# Patient Record
Sex: MALE | URBAN - NONMETROPOLITAN AREA
[De-identification: names, ages, dates, MRNs, and addresses within clinical notes are randomized per-mention and may not be internally consistent; named-entity substitution may affect disease eponyms.]

---

## 2022-02-16 LAB
AVERAGE GLUCOSE: NORMAL
HBA1C MFR BLD: 10 %

## 2022-10-13 ENCOUNTER — APPOINTMENT (RX ONLY)
Dept: URBAN - NONMETROPOLITAN AREA CLINIC 1 | Facility: CLINIC | Age: 72
Setting detail: DERMATOLOGY
End: 2022-10-13

## 2022-10-13 DIAGNOSIS — L57.0 ACTINIC KERATOSIS: ICD-10-CM

## 2022-10-13 DIAGNOSIS — D485 NEOPLASM OF UNCERTAIN BEHAVIOR OF SKIN: ICD-10-CM

## 2022-10-13 PROBLEM — D48.5 NEOPLASM OF UNCERTAIN BEHAVIOR OF SKIN: Status: ACTIVE | Noted: 2022-10-13

## 2022-10-13 PROCEDURE — 17003 DESTRUCT PREMALG LES 2-14: CPT

## 2022-10-13 PROCEDURE — 17000 DESTRUCT PREMALG LESION: CPT | Mod: 59

## 2022-10-13 PROCEDURE — ? BIOPSY BY SHAVE METHOD

## 2022-10-13 PROCEDURE — 11103 TANGNTL BX SKIN EA SEP/ADDL: CPT

## 2022-10-13 PROCEDURE — ? LIQUID NITROGEN

## 2022-10-13 PROCEDURE — 11102 TANGNTL BX SKIN SINGLE LES: CPT

## 2022-10-13 PROCEDURE — ? TREATMENT REGIMEN

## 2022-10-13 PROCEDURE — ? COUNSELING

## 2022-10-13 ASSESSMENT — LOCATION DETAILED DESCRIPTION DERM
LOCATION DETAILED: RIGHT DISTAL RADIAL DORSAL FOREARM
LOCATION DETAILED: LEFT CENTRAL ZYGOMA
LOCATION DETAILED: LEFT DISTAL ULNAR DORSAL FOREARM
LOCATION DETAILED: LEFT SUPERIOR HELIX
LOCATION DETAILED: RIGHT RADIAL DORSAL HAND
LOCATION DETAILED: LEFT LATERAL ANTECUBITAL SKIN
LOCATION DETAILED: RIGHT ULNAR DORSAL HAND
LOCATION DETAILED: RIGHT NASAL DORSUM
LOCATION DETAILED: 1ST WEB SPACE RIGHT HAND
LOCATION DETAILED: LEFT PROXIMAL RADIAL DORSAL FOREARM

## 2022-10-13 ASSESSMENT — LOCATION SIMPLE DESCRIPTION DERM
LOCATION SIMPLE: LEFT EAR
LOCATION SIMPLE: RIGHT THUMB
LOCATION SIMPLE: LEFT ELBOW
LOCATION SIMPLE: LEFT ZYGOMA
LOCATION SIMPLE: NOSE
LOCATION SIMPLE: RIGHT FOREARM
LOCATION SIMPLE: RIGHT HAND
LOCATION SIMPLE: LEFT FOREARM

## 2022-10-13 ASSESSMENT — LOCATION ZONE DERM
LOCATION ZONE: HAND
LOCATION ZONE: EAR
LOCATION ZONE: FINGER
LOCATION ZONE: FACE
LOCATION ZONE: NOSE
LOCATION ZONE: ARM

## 2022-10-13 NOTE — HPI: SKIN LESION
What Type Of Note Output Would You Prefer (Optional)?: Bullet Format
How Severe Is Your Skin Lesion?: mild
Has Your Skin Lesion Been Treated?: not been treated
Is This A New Presentation, Or A Follow-Up?: Skin Lesion
Additional History: Skin lesion on nose x4 months. Scabs and not healing has tried multiple topicals and not helping. Photo in chart

## 2022-10-13 NOTE — PROCEDURE: BIOPSY BY SHAVE METHOD
Detail Level: Detailed
Depth Of Biopsy: dermis
Was A Bandage Applied: Yes
Size Of Lesion In Cm: 1
X Size Of Lesion In Cm: 0
Biopsy Type: H and E
Biopsy Method: Personna blade
Anesthesia Type: 1% lidocaine with epinephrine and a 1:10 solution of 8.4% sodium bicarbonate
Anesthesia Volume In Cc (Will Not Render If 0): 0.5
Hemostasis: Aluminum Chloride
Wound Care: Petrolatum
Dressing: bandage
Destruction After The Procedure: No
Type Of Destruction Used: Curettage
Curettage Text: The wound bed was treated with curettage after the biopsy was performed.
Cryotherapy Text: The wound bed was treated with cryotherapy after the biopsy was performed.
Electrodesiccation Text: The wound bed was treated with electrodesiccation after the biopsy was performed.
Electrodesiccation And Curettage Text: The wound bed was treated with electrodesiccation and curettage after the biopsy was performed.
Silver Nitrate Text: The wound bed was treated with silver nitrate after the biopsy was performed.
Lab: -97
Consent: Written consent was obtained and risk was reviewed  per signed  consent form. Biopsy was performed with patient verbal permission.
Post-care instructions were given and  reviewed with the patient in detail. Patient is to keep the biopsy site dry overnight, and then apply healing ointment daily until healed. Patient is instructed to call for any questions or problems.
Notification Instructions: Patient will be notified of biopsy results. However, patient instructed to call the office if not contacted within 2 weeks.
Billing Type: Third-Party Bill
Information: Selecting Yes will display possible errors in your note based on the variables you have selected. This validation is only offered as a suggestion for you. PLEASE NOTE THAT THE VALIDATION TEXT WILL BE REMOVED WHEN YOU FINALIZE YOUR NOTE. IF YOU WANT TO FAX A PRELIMINARY NOTE YOU WILL NEED TO TOGGLE THIS TO 'NO' IF YOU DO NOT WANT IT IN YOUR FAXED NOTE.
Size Of Lesion In Cm: 1.5
Size Of Lesion In Cm: 1.1

## 2022-11-15 ENCOUNTER — APPOINTMENT (RX ONLY)
Dept: URBAN - NONMETROPOLITAN AREA CLINIC 1 | Facility: CLINIC | Age: 72
Setting detail: DERMATOLOGY
End: 2022-11-15

## 2022-11-15 DIAGNOSIS — D485 NEOPLASM OF UNCERTAIN BEHAVIOR OF SKIN: ICD-10-CM

## 2022-11-15 DIAGNOSIS — L82.1 OTHER SEBORRHEIC KERATOSIS: ICD-10-CM

## 2022-11-15 DIAGNOSIS — D22 MELANOCYTIC NEVI: ICD-10-CM

## 2022-11-15 DIAGNOSIS — L57.0 ACTINIC KERATOSIS: ICD-10-CM

## 2022-11-15 DIAGNOSIS — L57.8 OTHER SKIN CHANGES DUE TO CHRONIC EXPOSURE TO NONIONIZING RADIATION: ICD-10-CM | Status: INADEQUATELY CONTROLLED

## 2022-11-15 DIAGNOSIS — L81.4 OTHER MELANIN HYPERPIGMENTATION: ICD-10-CM

## 2022-11-15 PROBLEM — D22.5 MELANOCYTIC NEVI OF TRUNK: Status: ACTIVE | Noted: 2022-11-15

## 2022-11-15 PROBLEM — D48.5 NEOPLASM OF UNCERTAIN BEHAVIOR OF SKIN: Status: ACTIVE | Noted: 2022-11-15

## 2022-11-15 PROBLEM — C44.321 SQUAMOUS CELL CARCINOMA OF SKIN OF NOSE: Status: ACTIVE | Noted: 2022-11-15

## 2022-11-15 PROCEDURE — ? BIOPSY BY SHAVE METHOD

## 2022-11-15 PROCEDURE — ? COUNSELING

## 2022-11-15 PROCEDURE — ? FULL BODY SKIN EXAM

## 2022-11-15 PROCEDURE — ? TREATMENT REGIMEN

## 2022-11-15 PROCEDURE — ? ADDITIONAL NOTES

## 2022-11-15 PROCEDURE — 11103 TANGNTL BX SKIN EA SEP/ADDL: CPT

## 2022-11-15 PROCEDURE — ? LIQUID NITROGEN

## 2022-11-15 PROCEDURE — ? SUNSCREEN TREATMENT REGIMEN

## 2022-11-15 PROCEDURE — 17004 DESTROY PREMAL LESIONS 15/>: CPT

## 2022-11-15 PROCEDURE — 11102 TANGNTL BX SKIN SINGLE LES: CPT | Mod: 59

## 2022-11-15 PROCEDURE — 99213 OFFICE O/P EST LOW 20 MIN: CPT | Mod: 25

## 2022-11-15 PROCEDURE — ? ORDER FOR PHOTODYNAMIC THERAPY

## 2022-11-15 ASSESSMENT — LOCATION DETAILED DESCRIPTION DERM
LOCATION DETAILED: LEFT MID TEMPLE
LOCATION DETAILED: LEFT PROXIMAL DORSAL MIDDLE FINGER
LOCATION DETAILED: RIGHT ULNAR DORSAL HAND
LOCATION DETAILED: LEFT POSTERIOR SHOULDER
LOCATION DETAILED: SUPERIOR MID FOREHEAD
LOCATION DETAILED: LEFT DORSAL SMALL METACARPOPHALANGEAL JOINT
LOCATION DETAILED: LEFT INFERIOR UPPER BACK
LOCATION DETAILED: LEFT RADIAL DORSAL HAND
LOCATION DETAILED: RIGHT POSTERIOR SHOULDER
LOCATION DETAILED: RIGHT PROXIMAL DORSAL FOREARM
LOCATION DETAILED: LEFT LATERAL ELBOW
LOCATION DETAILED: LEFT DISTAL DORSAL FOREARM
LOCATION DETAILED: RIGHT NASAL ALA
LOCATION DETAILED: LEFT MID-UPPER BACK
LOCATION DETAILED: LEFT ANTERIOR EARLOBE
LOCATION DETAILED: RIGHT ANTITRAGUS
LOCATION DETAILED: RIGHT RADIAL DORSAL HAND
LOCATION DETAILED: LEFT ULNAR DORSAL HAND

## 2022-11-15 ASSESSMENT — LOCATION ZONE DERM
LOCATION ZONE: FACE
LOCATION ZONE: FINGER
LOCATION ZONE: ARM
LOCATION ZONE: HAND
LOCATION ZONE: TRUNK
LOCATION ZONE: EAR
LOCATION ZONE: NOSE

## 2022-11-15 ASSESSMENT — LOCATION SIMPLE DESCRIPTION DERM
LOCATION SIMPLE: LEFT EAR
LOCATION SIMPLE: LEFT FOREARM
LOCATION SIMPLE: RIGHT EAR
LOCATION SIMPLE: LEFT UPPER BACK
LOCATION SIMPLE: LEFT TEMPLE
LOCATION SIMPLE: RIGHT HAND
LOCATION SIMPLE: RIGHT SHOULDER
LOCATION SIMPLE: LEFT MIDDLE FINGER
LOCATION SIMPLE: RIGHT FOREARM
LOCATION SIMPLE: LEFT ELBOW
LOCATION SIMPLE: RIGHT NOSE
LOCATION SIMPLE: SUPERIOR FOREHEAD
LOCATION SIMPLE: LEFT HAND
LOCATION SIMPLE: LEFT SHOULDER

## 2022-11-15 NOTE — PROCEDURE: ORDER FOR PHOTODYNAMIC THERAPY
Location: Arms and Hands
Face And Ears Incubation Time: 1 Hour
Arms Incubation Time: 2 Hours
Occlusion: No
Detail Level: Simple
Face And Scalp Incubation Time: 1 Hour for the face and 2 Hours for the scalp
Photosensitizer: Levulan
Consent: The procedure and risks were reviewed with the patient including but not limited to: burning, pigmentary changes, pain, blistering, scabbing, redness, and the possibility of needing numerous treatments. Strict photoprotection after the procedure was also discussed.
Face Incubation Time: 2 Hour
Frequency Of Pdt: Single Treatment
Pdt Type: BRANDO-U

## 2022-11-15 NOTE — PROCEDURE: BIOPSY BY SHAVE METHOD
Detail Level: Detailed
Depth Of Biopsy: dermis
Was A Bandage Applied: Yes
Size Of Lesion In Cm: 1
X Size Of Lesion In Cm: 0
Biopsy Type: H and E
Biopsy Method: Personna blade
Anesthesia Type: 1% lidocaine with epinephrine and a 1:10 solution of 8.4% sodium bicarbonate
Anesthesia Volume In Cc (Will Not Render If 0): 0.5
Hemostasis: Aluminum Chloride
Wound Care: Petrolatum
Dressing: bandage
Destruction After The Procedure: No
Type Of Destruction Used: Curettage
Curettage Text: The wound bed was treated with curettage after the biopsy was performed.
Cryotherapy Text: The wound bed was treated with cryotherapy after the biopsy was performed.
Electrodesiccation Text: The wound bed was treated with electrodesiccation after the biopsy was performed.
Electrodesiccation And Curettage Text: The wound bed was treated with electrodesiccation and curettage after the biopsy was performed.
Silver Nitrate Text: The wound bed was treated with silver nitrate after the biopsy was performed.
Lab: -97
Consent: Written consent was obtained and risk was reviewed  per signed  consent form. Biopsy was performed with patient verbal permission.
Post-care instructions were given and  reviewed with the patient in detail. Patient is to keep the biopsy site dry overnight, and then apply healing ointment daily until healed. Patient is instructed to call for any questions or problems.
Notification Instructions: Patient will be notified of biopsy results. However, patient instructed to call the office if not contacted within 2 weeks.
Billing Type: Third-Party Bill
Information: Selecting Yes will display possible errors in your note based on the variables you have selected. This validation is only offered as a suggestion for you. PLEASE NOTE THAT THE VALIDATION TEXT WILL BE REMOVED WHEN YOU FINALIZE YOUR NOTE. IF YOU WANT TO FAX A PRELIMINARY NOTE YOU WILL NEED TO TOGGLE THIS TO 'NO' IF YOU DO NOT WANT IT IN YOUR FAXED NOTE.
Size Of Lesion In Cm: 0.7

## 2022-11-15 NOTE — PROCEDURE: ADDITIONAL NOTES
Detail Level: Simple
Additional Notes: Patient scheduled for MOHS on 1/26/23.
Render Risk Assessment In Note?: no

## 2022-12-06 ENCOUNTER — APPOINTMENT (RX ONLY)
Dept: URBAN - NONMETROPOLITAN AREA CLINIC 1 | Facility: CLINIC | Age: 72
Setting detail: DERMATOLOGY
End: 2022-12-06

## 2022-12-06 DIAGNOSIS — L57.0 ACTINIC KERATOSIS: ICD-10-CM

## 2022-12-06 PROBLEM — I10 ESSENTIAL HYPERTENSION: Status: ACTIVE | Noted: 2022-12-06

## 2022-12-06 PROBLEM — E78.5 DYSLIPIDEMIA: Status: ACTIVE | Noted: 2022-12-06

## 2022-12-06 PROBLEM — I25.10 CORONARY ARTERY DISEASE: Status: ACTIVE | Noted: 2022-12-06

## 2022-12-06 PROBLEM — E11.9 TYPE 2 DIABETES MELLITUS (HCC): Status: ACTIVE | Noted: 2022-12-06

## 2022-12-06 PROCEDURE — ? PDT: BLUE

## 2022-12-06 PROCEDURE — 96567 PDT DSTR PRMLG LES SKN: CPT

## 2022-12-06 ASSESSMENT — LOCATION ZONE DERM
LOCATION ZONE: HAND
LOCATION ZONE: ARM

## 2022-12-06 ASSESSMENT — LOCATION DETAILED DESCRIPTION DERM
LOCATION DETAILED: LEFT PROXIMAL DORSAL FOREARM
LOCATION DETAILED: RIGHT RADIAL DORSAL HAND
LOCATION DETAILED: RIGHT PROXIMAL DORSAL FOREARM
LOCATION DETAILED: 4TH WEB SPACE LEFT HAND

## 2022-12-06 ASSESSMENT — LOCATION SIMPLE DESCRIPTION DERM
LOCATION SIMPLE: RIGHT HAND
LOCATION SIMPLE: LEFT HAND
LOCATION SIMPLE: RIGHT FOREARM
LOCATION SIMPLE: LEFT FOREARM

## 2022-12-06 NOTE — PROGRESS NOTES
[facility] CARDIOLOGY  [address] [facility], [address]  PHONE: [phone]      22    NAME:  David Marroquin  : 1950  MRN: [id number]         SUBJECTIVE:   David Marroquin is a 67 y.o. male seen for a consultation visit regarding the following:     Chief Complaint   Patient presents with    Consultation     VA Stress Test/ ECHO    Fatigue    Shortness of Breath    Chest Pain            HPI:  Consultation is requested by Trisha Atkins MD for evaluation of Consultation (VA Stress Test/ ECHO), Fatigue, Shortness of Breath, and Chest Pain   . Consult from the [place], said to have had stress test and echo, having trouble getting records to open. I am able to see his notes from his local PCP back in October at which time he had uncontrolled DM and had mentioned exertional arm pain, declined private sector cardiology referral and went back to the [place]. Attempted a treadmill stress test there, only went 2 minutes, achieved 102% predicted HR, reviewed strips, no significant ST changes but some equivocal flattening, inconclusive study    He's felt well since 2 coronary stents in [place] in  [place], apparently NSTEMI since procedure was done over period of weeks. He's done well since then until about a year ago started feeling fatigue and bilateral arm pain with exertion. On the treadmill he had at the [place] he did have reproduction of symptoms. His symptoms have not occurred at rest.  Usually happens when walking his dog. Sometimes will feel it when he eats. He doesn't really recall his symptoms prior to the stent apart from significant fatigue. Discomfort lasts until he rests for a few minutes. + dyspnea    Struggles with diet sometimes, A1C fell from > 8 to 7.4%, has been as high as 11%. PAST CARDIAC HISTORY:          NSTEMI - coronary stents x 2  Oct 2022       TMET (at [facility]) - 1.56 seconds, 102% HR, equivocal ST changes.  Reproduced anginal symptoms Deconditioned. Inconclusive study          Past Medical History, Past Surgical History, Family history, Social History, and Medications were all reviewed with the patient today and updated as necessary. Prior to Admission medications    Medication Sig Start Date End Date Taking?  Authorizing Provider   alogliptin (NESINA) 12.5 MG TABS tablet Take 12.5 mg by mouth daily 1/28/21  Yes Historical Provider, MD   acetaminophen (TYLENOL) 325 MG tablet  4/16/21  Yes Historical Provider, MD   Artificial Tear Solution (JUST TEARS EYE DROPS) SOLN  7/27/22  Yes Historical Provider, MD   aspirin 81 MG EC tablet Take 81 mg by mouth daily 4/28/16  Yes Historical Provider, MD   vitamin D3 (CHOLECALCIFEROL) 10 MCG (400 UNIT) TABS tablet Take 400 Units by mouth daily   Yes Historical Provider, MD   cyanocobalamin 1000 MCG tablet Take 1,000 mcg by mouth daily   Yes Historical Provider, MD   ferrous sulfate (IRON 325) 325 (65 Fe) MG tablet  7/27/22  Yes Historical Provider, MD   glipiZIDE (GLUCOTROL) 10 MG tablet Take 10 mg by mouth 2 times daily (before meals) 6/21/19  Yes Historical Provider, MD   finasteride (PROSCAR) 5 MG tablet Take 5 mg by mouth daily 6/21/19  Yes Historical Provider, MD   HYDROcodone-acetaminophen ([facility])  MG per tablet TAKE 1 TABLET BY MOUTH EVERY 8 HOURS AS NEEDED FOR MODERATE PAIN OR CHRONIC PAIN 11/13/22  Yes Historical Provider, MD   insulin glargine (LANTUS;BASAGLAR) 100 UNIT/ML injection pen Inject 16 Units into the skin daily 7/27/22  Yes Historical Provider, MD   SURE COMFORT PEN NEEDLES 32G X 6 MM [address] [facility]  10/17/22  Yes Historical Provider, MD   losartan-hydroCHLOROthiazide (HYZAAR) 100-25 MG per tablet Take 1 tablet by mouth daily 12/5/18  Yes Historical Provider, MD   metFORMIN (GLUCOPHAGE) 1000 MG tablet  4/16/21  Yes Historical Provider, MD   magnesium oxide (MAG-OX) 400 MG tablet Take 1 tablet by mouth 2 times daily 7/27/22 2/24/23 Yes Historical Provider, MD   rosuvastatin (CRESTOR) 40 MG tablet Take 40 mg by mouth daily   Yes Historical Provider, MD   naloxone 4 MG/0.1ML LIQD nasal spray 1 spray once as needed 10/13/21  Yes Historical Provider, MD   miconazole (MICOTIN) 2 % powder  22  Yes Historical Provider, MD   metoprolol succinate (TOPROL XL) 25 MG extended release tablet Take 1 tablet by mouth daily 22  Yes Hugh Suh MD   isosorbide mononitrate (IMDUR) 30 MG extended release tablet Take 1 tablet by mouth daily 22  Yes Hugh Suh MD   nitroGLYCERIN (NITROSTAT) 0.4 MG SL tablet Place 1 tablet under the tongue every 5 minutes as needed for Chest pain (X 3 DOSES, THEN CALL 911) up to max of 3 total doses. If no relief after 1 dose, call 911. 22  Yes Hugh Suh MD     No Known Allergies  Past Medical History:   Diagnosis Date    Coronary artery disease     Dyslipidemia     Environmental allergies     Essential hypertension     Type 2 diabetes mellitus ([facility] [address]      Past Surgical History:   Procedure Laterality Date    DIAGNOSTIC CARDIAC CATH LAB PROCEDURE      PTCA       History reviewed. No pertinent family history. Social History     Tobacco Use    Smoking status: Former     Types: Cigarettes     Quit date:      Years since quittin.9     Passive exposure: Current    Smokeless tobacco: Never   Substance Use Topics    Alcohol use: Never       ROS:    Review of Systems   Cardiovascular:  Positive for chest pain. Respiratory:  Positive for cough and shortness of breath. Musculoskeletal:  Positive for back pain. PHYSICAL EXAM:   /82   Pulse 84   Ht 5' 10\" (1.778 m)   Wt 163 lb (73.9 kg)   BMI 23.39 kg/m²      Physical Exam  Constitutional:       Appearance: Normal appearance. HENT:      Head: Normocephalic and atraumatic. Eyes:      Conjunctiva/sclera: Conjunctivae normal.   Neck:      Vascular: No carotid bruit. Cardiovascular:      Rate and Rhythm: Normal rate and regular rhythm.       Heart sounds: No murmur heard. No friction rub. No gallop. Pulmonary:      Effort: No respiratory distress. Breath sounds: No wheezing or rales. Abdominal:      General: Abdomen is flat. There is no distension. Palpations: Abdomen is soft. Tenderness: There is no abdominal tenderness. Musculoskeletal:         General: No swelling. Cervical back: Neck supple. Skin:     General: Skin is warm and dry. Neurological:      General: No focal deficit present. Mental Status: He is alert. Psychiatric:         Mood and Affect: Mood normal.         Behavior: Behavior normal.       Medical problems and test results were reviewed with the patient today.      DATA REVIEW    10/11/22 0934     TRIGLYCERIDES 44 - 150 mg/dL 128    Comment: Triglyceride Level Risk Classification:   1. <150 mg/dL - Normal   2. 150-199 mg/dL - Borderline High   3. 200-500 mg/dL - High   4. >500 mg/dL - Very High   CHOLESTEROL 115 - 200 mg/dL 128    Comment: Cholesterol Values:   1. 200-239 mg/dL - Borderline   2. >240 mg/dL - High   HIGH DENSITY LIPOPROTEIN 40.0 - 60.0 mg/dL 37.0 Low     Comment: The NCEP guidelines classify HDL-C levels as follows:   1. <40 mg/dL as indicative of a major risk factor for Coronary Heart Disease   2. >60 mg/dL as a negative risk factor for Coronary Heart Disease   LOW DENSITY LIPOPROTEIN <100 mg/dL 76    Comment: The NCEP guidelines classify LDL-Cholesterol levels as follows:   1. <100 mg/dL - Optimal   2. 100-129 mg/dL - Near Optimal/Above Optimal   3. 131-159 mg/dL - Borderline High   4. 160-189 mg/dL - High   5. >190 mg/dL - Very High   VERY LOW DENSITY LIPID 10 - 40 mg/dL 25.6    NON-HDL CHOLESTEROL 0.00 - 130.00 mg/dL 91.00       10/11/22 0934    HEMOGLOBIN A1C 0.0 - 5.7 % 7.4 High        10/11/22 0934    WBC 3.1 - 9.7 x 10*3/uL 7.8    RBC 4.08 - 5.70 x 10*6/uL 4.87    HGB 13.1 - 16.8 g/dL 14.0    HCT 38.2 - 48.4 % 41.0    MCV 80.1 - 98.7 fL 84.0    MCH 27.5 - 34.3 pg 28.8    MCHC 33.2 - 35.7 g/dL 34.3    RDW 11.7 - 15.0 % 13.0    Platelets 858 - 941 x 10*3/uL 245       10/11/22 0934    Sodium 133 - 146 mmol/L 140    Potassium 3.5 - 5.2 mmol/L 4.4    Chloride 100 - 111 mmol/L 102    Carbon Dioxide 23.0 - 32.6 mmol/L 28.4    Anion Gap 6 - 13 mmol/L 10    Osmolality Calculation 271.00 - 318.00 mOsm/kg 284.22    Urea Nitrogen 7 - 25 mg/dL 18    Creatinine 0.47 - 1.35 mg/dL 1.25    BUN/Creat Ratio 8.00 - 20.00 NULL 14.40    Glucose 70 - 100 mg/dL 151 High     Calcium 8.9 - 10.3 mg/dL 9.5    Corrected Calcium 8.9 - 10.3 mg/dL 9.1    Total Protein 6.1 - 8.0 g/dL 7.5    Albumin 3.5 - 4.9 g/dL 4.5    Globulin 2.00 - 3.70 g/dL 3.00    Alanine Transferase 8 - 39 IU/L 23    Aspartamine Transferase 12 - 33 IU/L 23    Alkaline Phosphatase 36 - 108 IU/L 34 Low     Total Bilirubin 0.0 - 1.5 mg/dL 0.5    Albumin/Globulin Ratio 1 - 2 2    eGFR Do Not Use the lab's eGFR calculation for medication dose adjustments per Pharmacy. mL/min/1.73m*2 60.8    Modified Cockcroft-Gault CrCl 90.00 - 139.00 mL/minute 54.34 Low         EKG    Sinus  Rhythm 80  IMI age undetermined  normal axis  IVCD  NSST abnormality    CXR/IMAGING        DEVICE INTERROGATION        OUTSIDE RECORDS REVIEW    Records from outside providers have been reviewed and summarized as noted in the HPI, past history and data review sections of this note, and reviewed with patient. .       ASSESSMENT and Cindy Sandoval was seen today for consultation, fatigue, shortness of breath and chest pain. Diagnoses and all orders for this visit:    Essential hypertension    Coronary artery disease of native artery of native heart with stable angina pectoris ([address]  -     EKG 12 Lead    Dyslipidemia    Type 2 diabetes mellitus without complication, with long-term current use of insulin (HCC)    Accelerating angina [facility])  -     Case Request Cardiac Cath Lab  -     CBC; Future  -     Basic Metabolic Panel;  Future    Other orders  -     metoprolol succinate (TOPROL XL) 25 MG extended release tablet; Take 1 tablet by mouth daily  -     isosorbide mononitrate (IMDUR) 30 MG extended release tablet; Take 1 tablet by mouth daily  -     nitroGLYCERIN (NITROSTAT) 0.4 MG SL tablet; Place 1 tablet under the tongue every 5 minutes as needed for Chest pain (X 3 DOSES, THEN CALL 911) up to max of 3 total doses. If no relief after 1 dose, call 911. IMPRESSION:    Typical angina in an accelerating pattern with established coronary disease, diabetes. High risk patient. Add long acting nitrate, switch to once daily formulation of BB, reviewed use of prn nitrates and when to proceed to ER, and arrange LHC/PCI. Discussed risks of procedure including, but not limited to, bleeding, infection, MI, CVA, renal failure, allergic reaction, arterial damage. Patient understands and wishes to proceed. DM control is improving, encouraged to work on diet. He and his PCP have previously attempted Doris Solian and Rybelsus but not covered by the [place]. He believes the last attempt was a year ago. Pending course, would consider trying this again with patient assistance applications if necessary for improved cardiovascular outcomes in diabetics with SGLT2i and GLP 1 therapy. BP reasonably controlled, continue to monitor    He is anticipating surgery for a skin cancer in January. I am attempting to contact his dermatology practice, Karen Sinha PA-C to expedite that to be done prior to cath. Post stents, he would not be able to interrupt DAPT for at least 3-6 months --I've left a message with the nursing line, unable to speak with a provider.  [phone]    Return for AFTER PROCEDURE TO REVIEW RESULTS. Thank you for allowing me to participate in this patient's care. Please call or contact me if there are any questions or concerns regarding the above.       Bhupendra Mccarty MD  12/07/22  11:46 AM

## 2022-12-07 ENCOUNTER — INITIAL CONSULT (OUTPATIENT)
Dept: CARDIOLOGY CLINIC | Age: 72
End: 2022-12-07
Payer: OTHER GOVERNMENT

## 2022-12-07 VITALS
DIASTOLIC BLOOD PRESSURE: 82 MMHG | HEART RATE: 84 BPM | BODY MASS INDEX: 23.34 KG/M2 | SYSTOLIC BLOOD PRESSURE: 138 MMHG | HEIGHT: 70 IN | WEIGHT: 163 LBS

## 2022-12-07 DIAGNOSIS — I20.0 ACCELERATING ANGINA (HCC): ICD-10-CM

## 2022-12-07 DIAGNOSIS — I25.118 CORONARY ARTERY DISEASE OF NATIVE ARTERY OF NATIVE HEART WITH STABLE ANGINA PECTORIS (HCC): ICD-10-CM

## 2022-12-07 DIAGNOSIS — I10 ESSENTIAL HYPERTENSION: Primary | ICD-10-CM

## 2022-12-07 DIAGNOSIS — E11.9 TYPE 2 DIABETES MELLITUS WITHOUT COMPLICATION, WITH LONG-TERM CURRENT USE OF INSULIN (HCC): ICD-10-CM

## 2022-12-07 DIAGNOSIS — Z79.4 TYPE 2 DIABETES MELLITUS WITHOUT COMPLICATION, WITH LONG-TERM CURRENT USE OF INSULIN (HCC): ICD-10-CM

## 2022-12-07 DIAGNOSIS — E78.5 DYSLIPIDEMIA: ICD-10-CM

## 2022-12-07 LAB
ANION GAP SERPL CALC-SCNC: 8 MMOL/L (ref 2–11)
BUN SERPL-MCNC: 16 MG/DL (ref 8–23)
CALCIUM SERPL-MCNC: 10.2 MG/DL (ref 8.3–10.4)
CHLORIDE SERPL-SCNC: 103 MMOL/L (ref 101–110)
CO2 SERPL-SCNC: 27 MMOL/L (ref 21–32)
CREAT SERPL-MCNC: 1.3 MG/DL (ref 0.8–1.5)
ERYTHROCYTE [DISTWIDTH] IN BLOOD BY AUTOMATED COUNT: 13.2 % (ref 11.9–14.6)
GLUCOSE SERPL-MCNC: 292 MG/DL (ref 65–100)
HCT VFR BLD AUTO: 44 % (ref 41.1–50.3)
HGB BLD-MCNC: 14.1 G/DL (ref 13.6–17.2)
MCH RBC QN AUTO: 28.7 PG (ref 26.1–32.9)
MCHC RBC AUTO-ENTMCNC: 32 G/DL (ref 31.4–35)
MCV RBC AUTO: 89.6 FL (ref 82–102)
NRBC # BLD: 0 K/UL (ref 0–0.2)
PLATELET # BLD AUTO: 240 K/UL (ref 150–450)
PMV BLD AUTO: 10.1 FL (ref 9.4–12.3)
POTASSIUM SERPL-SCNC: 4.4 MMOL/L (ref 3.5–5.1)
RBC # BLD AUTO: 4.91 M/UL (ref 4.23–5.6)
SODIUM SERPL-SCNC: 138 MMOL/L (ref 133–143)
WBC # BLD AUTO: 7.4 K/UL (ref 4.3–11.1)

## 2022-12-07 PROCEDURE — 3079F DIAST BP 80-89 MM HG: CPT | Performed by: INTERNAL MEDICINE

## 2022-12-07 PROCEDURE — 3075F SYST BP GE 130 - 139MM HG: CPT | Performed by: INTERNAL MEDICINE

## 2022-12-07 PROCEDURE — 99204 OFFICE O/P NEW MOD 45 MIN: CPT | Performed by: INTERNAL MEDICINE

## 2022-12-07 PROCEDURE — 93000 ELECTROCARDIOGRAM COMPLETE: CPT | Performed by: INTERNAL MEDICINE

## 2022-12-07 PROCEDURE — 1123F ACP DISCUSS/DSCN MKR DOCD: CPT | Performed by: INTERNAL MEDICINE

## 2022-12-07 PROCEDURE — 3046F HEMOGLOBIN A1C LEVEL >9.0%: CPT | Performed by: INTERNAL MEDICINE

## 2022-12-07 RX ORDER — GLIPIZIDE 10 MG/1
10 TABLET ORAL
COMMUNITY
Start: 2019-06-21

## 2022-12-07 RX ORDER — OMEGA-3S/DHA/EPA/FISH OIL/D3 300MG-1000
400 CAPSULE ORAL DAILY
COMMUNITY

## 2022-12-07 RX ORDER — DEXTRAN 70/HYPROMELLOSE
DROPS OPHTHALMIC (EYE)
COMMUNITY
Start: 2022-07-27

## 2022-12-07 RX ORDER — METOPROLOL SUCCINATE 25 MG/1
25 TABLET, EXTENDED RELEASE ORAL DAILY
Qty: 90 TABLET | Refills: 3 | Status: SHIPPED | OUTPATIENT
Start: 2022-12-07

## 2022-12-07 RX ORDER — ASPIRIN 81 MG/1
81 TABLET ORAL DAILY
COMMUNITY
Start: 2016-04-28

## 2022-12-07 RX ORDER — FINASTERIDE 5 MG/1
5 TABLET, FILM COATED ORAL DAILY
COMMUNITY
Start: 2019-06-21

## 2022-12-07 RX ORDER — ISOSORBIDE MONONITRATE 30 MG/1
30 TABLET, EXTENDED RELEASE ORAL DAILY
Qty: 90 TABLET | Refills: 3 | Status: SHIPPED | OUTPATIENT
Start: 2022-12-07

## 2022-12-07 RX ORDER — ALOGLIPTIN 12.5 MG/1
12.5 TABLET, FILM COATED ORAL DAILY
COMMUNITY
Start: 2021-01-28

## 2022-12-07 RX ORDER — PEN NEEDLE, DIABETIC 32GX 5/32"
NEEDLE, DISPOSABLE MISCELLANEOUS
COMMUNITY
Start: 2022-10-17

## 2022-12-07 RX ORDER — MAGNESIUM OXIDE 400 MG/1
1 TABLET ORAL 2 TIMES DAILY
COMMUNITY
Start: 2022-07-27 | End: 2023-02-24

## 2022-12-07 RX ORDER — NALOXONE HYDROCHLORIDE 4 MG/.1ML
1 SPRAY NASAL
COMMUNITY
Start: 2021-10-13

## 2022-12-07 RX ORDER — HYDROCODONE BITARTRATE AND ACETAMINOPHEN 10; 325 MG/1; MG/1
TABLET ORAL
COMMUNITY
Start: 2022-11-13

## 2022-12-07 RX ORDER — LOSARTAN POTASSIUM AND HYDROCHLOROTHIAZIDE 25; 100 MG/1; MG/1
1 TABLET ORAL DAILY
COMMUNITY
Start: 2018-12-05

## 2022-12-07 RX ORDER — ROSUVASTATIN CALCIUM 40 MG/1
40 TABLET, COATED ORAL DAILY
COMMUNITY

## 2022-12-07 RX ORDER — FERROUS SULFATE 325(65) MG
TABLET ORAL
COMMUNITY
Start: 2022-07-27

## 2022-12-07 RX ORDER — NITROGLYCERIN 0.4 MG/1
0.4 TABLET SUBLINGUAL EVERY 5 MIN PRN
Qty: 25 TABLET | Refills: 5 | Status: SHIPPED | OUTPATIENT
Start: 2022-12-07

## 2022-12-07 RX ORDER — ACETAMINOPHEN 325 MG/1
TABLET ORAL
COMMUNITY
Start: 2021-04-16

## 2022-12-07 ASSESSMENT — ENCOUNTER SYMPTOMS
BACK PAIN: 1
COUGH: 1
SHORTNESS OF BREATH: 1

## 2022-12-07 NOTE — PATIENT INSTRUCTIONS
Patient Education        Angina: Care Instructions  Overview     Angina happens when there is not enough blood flow to your heart muscle. Angina is a sign of coronary artery disease. Coronary artery disease happens when fatty deposits called plaque (say \"plak\") build up inside your coronary arteries. This plaque may limit the amount of blood to your heart muscle. Having coronary artery disease also increases your risk of a heart attack. Chest pain or pressure is the most common symptom of angina. But some people have other symptoms, like:  Pain, pressure, or a strange feeling in the back, neck, jaw, or upper belly, or in one or both shoulders or arms. Shortness of breath. Nausea or vomiting. Lightheadedness or sudden weakness. Fast or irregular heartbeat. Most women feel symptoms in their chest. But women are somewhat more likely than men to have other angina symptoms like shortness of breath, tiredness, nausea, and back or jaw pain. Angina can be dangerous. That's why it is important to pay attention to your symptoms. Know what is typical for you, learn how to control your symptoms, and understand when you need to get treatment. A change in your usual pattern of symptoms is an emergency. It may mean that you are having a heart attack. The doctor has checked you carefully, but problems can develop later. If you notice any problems or new symptoms, get medical treatment right away. Follow-up care is a key part of your treatment and safety. Be sure to make and go to all appointments, and call your doctor if you are having problems. It's also a good idea to know your test results and keep a list of the medicines you take. How can you care for yourself at home? Medicines    If your doctor has given you nitroglycerin for angina symptoms, keep it with you at all times. If you have symptoms, sit down and rest, and take the first dose of nitroglycerin as directed.  If your symptoms get worse or are not getting better within 5 minutes, call 911 right away. Stay on the phone. The emergency  will give you further instructions. If your doctor advises it, take 1 low-dose aspirin a day to prevent heart attack. Be safe with medicines. Take your medicines exactly as prescribed. Call your doctor if you think you are having a problem with your medicine. You will get more details on the specific medicines your doctor prescribes. Lifestyle changes    Do not smoke. If you need help quitting, talk to your doctor about stop-smoking programs and medicines. These can increase your chances of quitting for good. Eat a heart-healthy diet that is low in saturated fat and salt, and is high in fiber. Talk to your doctor or a dietitian about healthy eating. Stay at a healthy weight. Or lose weight if you need to. Activity    Talk to your doctor about a level of activity that is safe for you. If an activity causes angina symptoms, stop and rest.   When should you call for help? Call 911 anytime you think you may need emergency care. For example, call if:    You passed out (lost consciousness). You have symptoms of a heart attack. These may include:  Chest pain or pressure, or a strange feeling in the chest.  Sweating. Shortness of breath. Nausea or vomiting. Pain, pressure, or a strange feeling in the back, neck, jaw, or upper belly or in one or both shoulders or arms. Lightheadedness or sudden weakness. A fast or irregular heartbeat. After you call 911, the  may tell you to chew 1 adult-strength or 2 to 4 low-dose aspirin. Wait for an ambulance. Do not try to drive yourself. You have angina symptoms that do not go away with rest or are not getting better within 5 minutes after you take a dose of nitroglycerin. Call your doctor now if:    Your angina symptoms seem worse but still follow your typical pattern.  You can predict when symptoms will happen, but they may come on sooner, feel worse, or last longer. You feel dizzy or lightheaded, or you feel like you may faint. Watch closely for changes in your health, and be sure to contact your doctor if you have any problems. Where can you learn more? Go to [email or web address] org and sign in to your [employer] account. Enter H129 in the FUELUP box to learn more about \"Angina: Care Instructions. \"     If you do not have an account, please click on the \"Sign Up Now\" link. Current as of: January 10, 2022               Content Version: 13.4  © 1507-0679 Healthwise, [employer]. Care instructions adapted under license by [facility] [place] ([facility]). If you have questions about a medical condition or this instruction, always ask your healthcare professional. [address] any warranty or liability for your use of this information. Please visit www.cardiosmart. org for more information regarding cardiovascular disease prevention and treatment.

## 2022-12-16 ENCOUNTER — TELEPHONE (OUTPATIENT)
Dept: CARDIOLOGY CLINIC | Age: 72
End: 2022-12-16

## 2022-12-16 NOTE — TELEPHONE ENCOUNTER
Wants to cancel his heart cath on Monday. He is still sick with sore throat and his handicapped wife has the flu. Having surgery on a skin cancer on his nose soon too and wants to wait til after Ned on his cath . Wants Destiney to call him please and cancel Mondays cath

## 2022-12-19 ENCOUNTER — TELEPHONE (OUTPATIENT)
Dept: CARDIOLOGY CLINIC | Age: 72
End: 2022-12-19

## 2022-12-19 NOTE — TELEPHONE ENCOUNTER
Sierra Cintron with [facility] called stating she needs the OV notes with Dr Henna Vivas on 12/7/22. Please fax to # [phone].

## 2023-01-09 NOTE — PROGRESS NOTES
[facility] CARDIOLOGY  [address], [address]  PHONE: [phone]      01/10/23    NAME:  Jasson Calzaad  : 1950  MRN: [id number]         SUBJECTIVE:   Jasson Calzada is a 75 y.o. male seen for a follow up visit regarding the following:     Chief Complaint   Patient presents with    Fatigue     1 month visit and med review. HPI:  Follow up  Fatigue (1 month visit and med review.  )   . Was supposed to have had LHC for accelerating angina but patient developed URI and canceled it, hasn't yet rescheduled, states he was unable to get dermatologist to move up the procedure, we had contacted them to expedite it but it's still scheduled for 2 weeks from now, using a traveling surgeon, apparently, who was not able to reschedule. He has had no anginal quality chest pain, continues to note dyspnea and morning sputum production but better than previous. Fax note to dermatology practice: Sandra Maynard PA-C  Ph [phone]        PAST CARDIAC HISTORY:          NSTEMI - coronary stents x 2  Oct 2022       TMET (at [facility]) - 1.56 seconds, 102% HR, equivocal ST changes. Reproduced anginal symptoms Deconditioned. Inconclusive study  Dec 2022       LHC - canceled d/t URI             Key CAD CHF Meds            losartan (COZAAR) 100 MG tablet (Taking)    Sig - Route: Take 1 tablet by mouth daily - Oral    rosuvastatin (CRESTOR) 40 MG tablet (Taking)    Class: Historical Med    metoprolol succinate (TOPROL XL) 25 MG extended release tablet (Taking)    Sig - Route: Take 1 tablet by mouth daily - Oral          Key Antihyperglycemic Medications            dapagliflozin (FARXIGA) 10 MG tablet (Taking)    Sig - Route:  Take 1 tablet by mouth every morning - Oral    glipiZIDE (GLUCOTROL) 10 MG tablet (Taking)    Class: Historical Med    alogliptin (NESINA) 12.5 MG TABS tablet (Taking)    Class: Historical Med    insulin glargine (LANTUS;BASAGLAR) 100 UNIT/ML injection pen (Taking) Class: Historical Med    metFORMIN (GLUCOPHAGE) 1000 MG tablet (Taking)    Class: Historical Med                Past Medical History, Past Surgical History, Family history, Social History, and Medications were all reviewed with the patient today and updated as necessary. Prior to Admission medications    Medication Sig Start Date End Date Taking?  Authorizing Provider   dapagliflozin (FARXIGA) 10 MG tablet Take 1 tablet by mouth every morning 1/10/23  Yes John Claros MD   losartan (COZAAR) 100 MG tablet Take 1 tablet by mouth daily 1/10/23  Yes John Claros MD   glipiZIDE (GLUCOTROL) 10 MG tablet Take 1 tablet by mouth daily (with breakfast) 1/10/23  Yes John Claros MD   alogliptin (NESINA) 12.5 MG TABS tablet Take 12.5 mg by mouth daily 1/28/21  Yes Historical Provider, MD   acetaminophen (TYLENOL) 325 MG tablet  4/16/21  Yes Historical Provider, MD   Artificial Tear Solution (JUST TEARS EYE DROPS) SOLN  7/27/22  Yes Historical Provider, MD   aspirin 81 MG EC tablet Take 81 mg by mouth daily 4/28/16  Yes Historical Provider, MD   vitamin D3 (CHOLECALCIFEROL) 10 MCG (400 UNIT) TABS tablet Take 400 Units by mouth daily   Yes Historical Provider, MD   cyanocobalamin 1000 MCG tablet Take 1,000 mcg by mouth daily   Yes Historical Provider, MD   ferrous sulfate (IRON 325) 325 (65 Fe) MG tablet  7/27/22  Yes Historical Provider, MD   finasteride (PROSCAR) 5 MG tablet Take 5 mg by mouth daily 6/21/19  Yes Historical Provider, MD   HYDROcodone-acetaminophen ([facility])  MG per tablet TAKE 1 TABLET BY MOUTH EVERY 8 HOURS AS NEEDED FOR MODERATE PAIN OR CHRONIC PAIN 11/13/22  Yes Historical Provider, MD   insulin glargine (LANTUS;BASAGLAR) 100 UNIT/ML injection pen Inject 16 Units into the skin daily 7/27/22  Yes Historical Provider, MD   SURE COMFORT PEN NEEDLES 32G X 6 MM [facility]  10/17/22  Yes Historical Provider, MD   metFORMIN (GLUCOPHAGE) 1000 MG tablet  4/16/21  Yes Historical Provider, MD   magnesium oxide (MAG-OX) 400 MG tablet Take 1 tablet by mouth 2 times daily 22 Yes Historical Provider, MD   rosuvastatin (CRESTOR) 40 MG tablet Take 40 mg by mouth daily   Yes Historical Provider, MD   naloxone 4 MG/0.1ML LIQD nasal spray 1 spray once as needed 10/13/21  Yes Historical Provider, MD   miconazole (MICOTIN) 2 % powder  22  Yes Historical Provider, MD   metoprolol succinate (TOPROL XL) 25 MG extended release tablet Take 1 tablet by mouth daily 22  Yes Sera Gross MD   isosorbide mononitrate (IMDUR) 30 MG extended release tablet Take 1 tablet by mouth daily 22  Yes Sera Gross MD   nitroGLYCERIN (NITROSTAT) 0.4 MG SL tablet Place 1 tablet under the tongue every 5 minutes as needed for Chest pain (X 3 DOSES, THEN CALL 911) up to max of 3 total doses. If no relief after 1 dose, call 911. 22  Yes Sera Gross MD     Allergies   Allergen Reactions    Ampicillin Hives    Sulfa Antibiotics Hives     Past Medical History:   Diagnosis Date    Coronary artery disease     Dyslipidemia     Environmental allergies     Essential hypertension     Type 2 diabetes mellitus ([facility] [address]      Past Surgical History:   Procedure Laterality Date    DIAGNOSTIC CARDIAC CATH LAB PROCEDURE      PTCA       History reviewed. No pertinent family history. Social History     Tobacco Use    Smoking status: Former     Types: Cigarettes     Quit date:      Years since quittin.0     Passive exposure: Current    Smokeless tobacco: Never   Substance Use Topics    Alcohol use: Never       ROS:    Review of Systems   Constitutional: Positive for malaise/fatigue. Cardiovascular:  Negative for chest pain and leg swelling. Respiratory:  Positive for cough, shortness of breath and sputum production.          PHYSICAL EXAM:   /64   Pulse 84   Ht 5' 10\" (1.778 m)   Wt 163 lb 12.8 oz (74.3 kg) Comment: with shoes  BMI 23.50 kg/m²      Wt Readings from Last 3 Encounters:   01/10/23 163 lb 12.8 oz (74.3 kg)   12/07/22 163 lb (73.9 kg)     BP Readings from Last 3 Encounters:   01/10/23 126/64   12/07/22 138/82     Pulse Readings from Last 3 Encounters:   01/10/23 84   12/07/22 84           Physical Exam  Constitutional:       Appearance: Normal appearance. HENT:      Head: Normocephalic and atraumatic. Eyes:      Conjunctiva/sclera: Conjunctivae normal.   Neck:      Vascular: No carotid bruit. Cardiovascular:      Rate and Rhythm: Normal rate and regular rhythm. Heart sounds: No murmur heard. No friction rub. No gallop. Pulmonary:      Effort: No respiratory distress. Breath sounds: No wheezing or rales. Musculoskeletal:         General: No swelling. Cervical back: Neck supple. Skin:     General: Skin is warm and dry. Neurological:      General: No focal deficit present. Mental Status: He is alert. Psychiatric:         Mood and Affect: Mood normal.         Behavior: Behavior normal.       Medical problems and test results were reviewed with the patient today. DATA REVIEW        BMP  Lab Results   Component Value Date/Time     12/07/2022 11:11 AM    K 4.4 12/07/2022 11:11 AM     12/07/2022 11:11 AM    CO2 27 12/07/2022 11:11 AM    BUN 16 12/07/2022 11:11 AM    CREATININE 1.30 12/07/2022 11:11 AM    GLUCOSE 292 12/07/2022 11:11 AM    CALCIUM 10.2 12/07/2022 11:11 AM          EKG        CXR/IMAGING        DEVICE INTERROGATION        OUTSIDE RECORDS REVIEW    Records from outside providers have been reviewed and summarized as noted in the HPI, past history and data review sections of this note, and reviewed with patient. .       LYDIA and Becca Lloyd was seen today for fatigue. Diagnoses and all orders for this visit:    Accelerating angina [facility])  -     Basic Metabolic Panel; Future  -     CBC;  Future    Coronary artery disease involving native coronary artery of native heart with unstable angina pectoris [facility])  -     Basic Metabolic Panel; Future  -     CBC; Future    Essential hypertension    Dyslipidemia    Type 2 diabetes mellitus without complication, with long-term current use of insulin ([address]    Other orders  -     dapagliflozin (FARXIGA) 10 MG tablet; Take 1 tablet by mouth every morning  -     losartan (COZAAR) 100 MG tablet; Take 1 tablet by mouth daily        IMPRESSION:    Patient had stress test at the [place], results above per HPI, indicating at best deconditioning and at worst multivessel disease. As a diabetic, he may have mortality benefit from revascularization depending on his anatomy. Symptoms have improved though persist on current medical therapy, and he is anxious to have skin cancer surgery on the 26th which apparently he was told cannot be expedited. Discussed optimal medical therapy (OMT). He is currently on high intensity statin therapy, ARB, (low dose) BB and aspirin. Recommend addition of SGLT2i therapy in this diabetic with established coronary artery disease for improved cardiovascular outcomes. After starting Sophy Manner, advised to reduce his glipizide to once daily and check glucose at home 4 times a day until follow up, notify immediately for hypoglycemia and follow up with PCP ASAP to help guide other DM therapy with this change. His PCP has tried previously to put him on this therapy but reports [facility] \"wouldn't [facility]\". As noted, clearly indicated as guideline directed therapy. Asked patient to communicate about cost to allow prior authorization process or patient assistance applications. Advised with addition of Farxiga, removing hctz from his ARB and will get labs both for this and pre-cath in 2 weeks. While he remains low risk for dermatologic surgery, he should continue low dose ASA throughout d/t elevated ischemic risk and low risk of major bleeding.      Once established on Farxiga with removal of diuretic, will plan to push BB to aim for resting heart rate 50-60 for ongoing OMT of underlying CAD. Rescheduling [facility] for after skin cancer removal.         Return for AFTER PROCEDURE TO REVIEW RESULTS. Thank you for allowing me to participate in this patient's care. Please call or contact me if there are any questions or concerns regarding the above.       Lex Braden MD  01/10/23  10:10 AM

## 2023-01-10 ENCOUNTER — TELEPHONE (OUTPATIENT)
Dept: CARDIOLOGY CLINIC | Age: 73
End: 2023-01-10

## 2023-01-10 ENCOUNTER — OFFICE VISIT (OUTPATIENT)
Dept: CARDIOLOGY CLINIC | Age: 73
End: 2023-01-10
Payer: OTHER GOVERNMENT

## 2023-01-10 VITALS
WEIGHT: 163.8 LBS | SYSTOLIC BLOOD PRESSURE: 126 MMHG | HEART RATE: 84 BPM | DIASTOLIC BLOOD PRESSURE: 64 MMHG | HEIGHT: 70 IN | BODY MASS INDEX: 23.45 KG/M2

## 2023-01-10 DIAGNOSIS — E11.9 TYPE 2 DIABETES MELLITUS WITHOUT COMPLICATION, WITH LONG-TERM CURRENT USE OF INSULIN (HCC): ICD-10-CM

## 2023-01-10 DIAGNOSIS — E78.5 DYSLIPIDEMIA: ICD-10-CM

## 2023-01-10 DIAGNOSIS — I10 ESSENTIAL HYPERTENSION: ICD-10-CM

## 2023-01-10 DIAGNOSIS — I25.110 CORONARY ARTERY DISEASE INVOLVING NATIVE CORONARY ARTERY OF NATIVE HEART WITH UNSTABLE ANGINA PECTORIS (HCC): ICD-10-CM

## 2023-01-10 DIAGNOSIS — I20.0 ACCELERATING ANGINA (HCC): Primary | ICD-10-CM

## 2023-01-10 DIAGNOSIS — Z79.4 TYPE 2 DIABETES MELLITUS WITHOUT COMPLICATION, WITH LONG-TERM CURRENT USE OF INSULIN (HCC): ICD-10-CM

## 2023-01-10 PROCEDURE — 3074F SYST BP LT 130 MM HG: CPT | Performed by: INTERNAL MEDICINE

## 2023-01-10 PROCEDURE — 3078F DIAST BP <80 MM HG: CPT | Performed by: INTERNAL MEDICINE

## 2023-01-10 PROCEDURE — 99214 OFFICE O/P EST MOD 30 MIN: CPT | Performed by: INTERNAL MEDICINE

## 2023-01-10 PROCEDURE — 1123F ACP DISCUSS/DSCN MKR DOCD: CPT | Performed by: INTERNAL MEDICINE

## 2023-01-10 RX ORDER — LOSARTAN POTASSIUM 100 MG/1
100 TABLET ORAL DAILY
Qty: 90 TABLET | Refills: 3 | Status: SHIPPED | OUTPATIENT
Start: 2023-01-10

## 2023-01-10 RX ORDER — GLIPIZIDE 10 MG/1
10 TABLET ORAL
Qty: 60 TABLET | COMMUNITY
Start: 2023-01-10

## 2023-01-10 ASSESSMENT — ENCOUNTER SYMPTOMS
COUGH: 1
SPUTUM PRODUCTION: 1
SHORTNESS OF BREATH: 1

## 2023-01-10 ASSESSMENT — PATIENT HEALTH QUESTIONNAIRE - PHQ9
SUM OF ALL RESPONSES TO PHQ9 QUESTIONS 1 & 2: 0
SUM OF ALL RESPONSES TO PHQ QUESTIONS 1-9: 0
2. FEELING DOWN, DEPRESSED OR HOPELESS: 0
SUM OF ALL RESPONSES TO PHQ QUESTIONS 1-9: 0
1. LITTLE INTEREST OR PLEASURE IN DOING THINGS: 0

## 2023-01-10 NOTE — TELEPHONE ENCOUNTER
New coupon with expiration date of 12/31/2023 to be picked up at [facility].

## 2023-01-10 NOTE — PATIENT INSTRUCTIONS
Do not stop aspirinPatient Education        Learning About Coronary Artery Disease (CAD)  What is coronary artery disease? Coronary artery disease is a condition that occurs when plaque builds up in the arteries that bring oxygen-rich blood to your heart. Plaque is a fatty substance made of cholesterol, calcium, and other substances in the blood. This process is called hardening of the arteries, or atherosclerosis. What happens when you have coronary artery disease? Plaque may narrow the coronary arteries. Narrowed arteries cause poor blood flow. This can lead to angina symptoms such as chest pain or discomfort. If blood flow is completely blocked, you could have a heart attack. You can slow and reduce the risk of future problems by making changes in your lifestyle. These include quitting smoking and eating heart-healthy foods. Treatment, along with changes in your lifestyle, can help you live a longer and healthier life. How can you prevent coronary artery disease? Do not smoke. It may be the best thing you can do to prevent coronary artery disease. If you need help quitting, talk to your doctor about stop-smoking programs and medicines. These can increase your chances of quitting for good. Be active. Try to do moderate activity at least 2½ hours a week. Or try to do vigorous activity at least 1¼ hours a week. You may want to walk or try other activities, such as running, swimming, cycling, or playing tennis or team sports. Eat heart-healthy foods. Eat more fruits and vegetables and less food that contains saturated and trans fats. Limit alcohol, sodium, and sweets. Stay at a healthy weight. Lose weight if you need to. Manage other health problems such as diabetes, high blood pressure, and high cholesterol. How is coronary artery disease treated? Your doctor will suggest that you make lifestyle changes.  For example, your doctor may ask you to eat healthy foods, quit smoking, lose extra weight, and be more active. You will take medicines that help prevent a heart attack. Your doctor may suggest a procedure to open narrowed or blocked arteries. This is called angioplasty. Or your doctor may suggest using healthy blood vessels to create detours around narrowed or blocked arteries. This is called bypass surgery. Follow-up care is a key part of your treatment and safety. Be sure to make and go to all appointments, and call your doctor if you are having problems. It's also a good idea to know your test results and keep a list of the medicines you take. Where can you learn more? Go to http://www.woods.com/ and enter C643 to learn more about \"Learning About Coronary Artery Disease (CAD). \"  Current as of: September 7, 2022               Content Version: 13.5  © 2006-2022 Healthwise[employer] Inc[employer]. Care instructions adapted under license by [facility] ([facility]). If you have questions about a medical condition or this instruction, always ask your healthcare professional. [address] any warranty or liability for your use of this information. Please visit www.cardiosmart. org for more information regarding cardiovascular disease prevention and treatment.

## 2023-01-10 NOTE — TELEPHONE ENCOUNTER
Pt states that he was given a coupon today for farxiga but the coupon was . Wants another coupon because the med is expensive.

## 2023-01-12 ENCOUNTER — APPOINTMENT (RX ONLY)
Dept: URBAN - NONMETROPOLITAN AREA CLINIC 1 | Facility: CLINIC | Age: 73
Setting detail: DERMATOLOGY
End: 2023-01-12

## 2023-01-12 DIAGNOSIS — L57.8 OTHER SKIN CHANGES DUE TO CHRONIC EXPOSURE TO NONIONIZING RADIATION: ICD-10-CM | Status: INADEQUATELY CONTROLLED

## 2023-01-12 DIAGNOSIS — L57.0 ACTINIC KERATOSIS: ICD-10-CM

## 2023-01-12 PROCEDURE — 17000 DESTRUCT PREMALG LESION: CPT

## 2023-01-12 PROCEDURE — ? SUNSCREEN RECOMMENDATIONS

## 2023-01-12 PROCEDURE — 17003 DESTRUCT PREMALG LES 2-14: CPT

## 2023-01-12 PROCEDURE — ? MDM - TREATMENT GOALS

## 2023-01-12 PROCEDURE — 99213 OFFICE O/P EST LOW 20 MIN: CPT | Mod: 25

## 2023-01-12 PROCEDURE — ? LIQUID NITROGEN

## 2023-01-12 PROCEDURE — ? COUNSELING

## 2023-01-12 ASSESSMENT — LOCATION ZONE DERM
LOCATION ZONE: HAND
LOCATION ZONE: ARM
LOCATION ZONE: FINGER
LOCATION ZONE: FACE
LOCATION ZONE: EAR

## 2023-01-12 ASSESSMENT — LOCATION DETAILED DESCRIPTION DERM
LOCATION DETAILED: LEFT CENTRAL MALAR CHEEK
LOCATION DETAILED: RIGHT RADIAL DORSAL HAND
LOCATION DETAILED: RIGHT INFERIOR HELIX
LOCATION DETAILED: LEFT DISTAL ULNAR DORSAL FOREARM
LOCATION DETAILED: RIGHT ULNAR DORSAL HAND
LOCATION DETAILED: RIGHT PROXIMAL DORSAL THUMB
LOCATION DETAILED: RIGHT DORSAL WRIST
LOCATION DETAILED: RIGHT DISTAL DORSAL FOREARM
LOCATION DETAILED: LEFT MEDIAL FOREHEAD

## 2023-01-12 ASSESSMENT — LOCATION SIMPLE DESCRIPTION DERM
LOCATION SIMPLE: RIGHT EAR
LOCATION SIMPLE: RIGHT HAND
LOCATION SIMPLE: RIGHT THUMB
LOCATION SIMPLE: LEFT CHEEK
LOCATION SIMPLE: LEFT FOREARM
LOCATION SIMPLE: LEFT FOREHEAD
LOCATION SIMPLE: RIGHT WRIST
LOCATION SIMPLE: RIGHT FOREARM

## 2023-01-12 NOTE — PROCEDURE: LIQUID NITROGEN
Consent: The patient's consent was obtained including but not limited to risks of crusting, scabbing, blistering, scarring, darker or lighter pigmentary change, recurrence, incomplete removal and infection.
Render Post-Care Instructions In Note?: yes
Duration Of Freeze Thaw-Cycle (Seconds): 5
Detail Level: Detailed
Post-Care Instructions: I reviewed with the patient in detail post-care instructions. Patient is to wear sunprotection, and avoid picking at any of the treated lesions. Pt may apply Vaseline to crusted or scabbing areas.
Render Note In Bullet Format When Appropriate: No

## 2023-01-16 ENCOUNTER — TELEPHONE (OUTPATIENT)
Dept: CARDIOLOGY CLINIC | Age: 73
End: 2023-01-16

## 2023-01-16 NOTE — TELEPHONE ENCOUNTER
Pt notified of Dr. Luis Hughes response. Pt will stay off the [address] & continue Benadryl. He will discuss with her further at his next follow up visit.

## 2023-01-16 NOTE — TELEPHONE ENCOUNTER
Pt called c/o itching all over his body & rash on his hands. States he is almost positive it's from the [place]. No other new meds. Started Farxiga 6 days ago (1/11/23). He said itching & rash started 2-3 days after starting it. No c/o any other symptoms. Pt states he has not taken the [place] this AM. States he took Benadryl an hour ago which seems to be helping.

## 2023-01-16 NOTE — TELEPHONE ENCOUNTER
Pt notified of Dr. Lindwood Cabot response.  Message sent to Dr. Todd Landau to review & advise of any other treatment

## 2023-01-26 ENCOUNTER — APPOINTMENT (RX ONLY)
Dept: URBAN - NONMETROPOLITAN AREA CLINIC 1 | Facility: CLINIC | Age: 73
Setting detail: DERMATOLOGY
End: 2023-01-26

## 2023-01-26 PROBLEM — C44.321 SQUAMOUS CELL CARCINOMA OF SKIN OF NOSE: Status: ACTIVE | Noted: 2023-01-26

## 2023-01-26 PROCEDURE — ? MOHS SURGERY

## 2023-01-26 PROCEDURE — 17312 MOHS ADDL STAGE: CPT

## 2023-01-26 PROCEDURE — 17311 MOHS 1 STAGE H/N/HF/G: CPT

## 2023-01-26 NOTE — PROCEDURE: MOHS SURGERY
Mohs Method Verbiage: An incision at a 45 degree angle following the standard Mohs approach was done and the specimen was harvested as a microscopic controlled layer.\\Andrew histological findings, and, if present, perineural invasion or presence of scar is noted on the Mohs map. See scanned document Mohs map from today's visit for actual / definitive pathology findings from today's encounter. In any case where this is a discrepancy between the note here and the Mohs map, the Mohs map is definitive and should be taken as the accurate record of today's findings.

## 2023-01-26 NOTE — PROCEDURE: MOHS SURGERY
Removed With: gradle excision Purse String (Intermediate) Text: Given the location of the defect and the characteristics of the surrounding skin a purse string intermediate closure was deemed most appropriate.  Undermining was performed circumfirentially around the surgical defect.  A purse string suture was then placed and tightened.

## 2023-01-26 NOTE — PROCEDURE: MOHS SURGERY
Anesthesia Volume In Cc: 6 [place] Pregnancy And Lactation Text: This medication is not safe during pregnancy. It is unknown if this medication is excreted in breast milk.

## 2023-01-26 NOTE — PROCEDURE: MOHS SURGERY
abd/back pain Cheiloplasty (Complex) Text: A decision was made to reconstruct the defect with a  cheiloplasty.  The defect was undermined extensively.  Additional obicularis oris muscle was excised with a 15 blade scalpel.  The defect was converted into a full thickness wedge to facilite a better cosmetic result.  Small vessels were then tied off with 5-0 monocyrl. The obicularis oris, superficial fascia, adipose and dermis were then reapproximated.  After the deeper layers were approximated the epidermis was reapproximated with particular care given to realign the vermilion border.

## 2023-01-26 NOTE — PROCEDURE: MOHS SURGERY
Health Maintenance Due   Topic Date Due   • Colorectal Cancer Screening-Colonoscopy  01/17/2015   • Shingles Vaccine (1 of 2) 01/17/2015   • Hepatitis C Screening  01/17/2016       Patient is due for topics as listed above but is not proceeding with Immunization(s) Shingles, Colorectal Cancer Screening: iFOBT and Hepatitis C Screening at this time. Education provided for Immunization(s) Shingles, Colorectal Cancer Screening: iFOBT and Hepatitis C Screening.           Interpolation Flap Text: A decision was made to reconstruct the defect utilizing an interpolation axial flap and a staged reconstruction.  A telfa template was made of the defect.  This telfa template was then used to outline the interpolation flap.  The donor area for the pedicle flap was then injected with anesthesia.  The flap was excised through the skin and subcutaneous tissue down to the layer of the underlying musculature.  The interpolation flap was carefully excised within this deep plane to maintain its blood supply.  The edges of the donor site were undermined.   The donor site was closed in a primary fashion.  The pedicle was then rotated into position and sutured.  Once the tube was sutured into place, adequate blood supply was confirmed with blanching and refill.  The pedicle was then wrapped with xeroform gauze and dressed appropriately with a telfa and gauze bandage to ensure continued blood supply and protect the attached pedicle.

## 2023-01-27 ENCOUNTER — APPOINTMENT (RX ONLY)
Dept: URBAN - NONMETROPOLITAN AREA CLINIC 1 | Facility: CLINIC | Age: 73
Setting detail: DERMATOLOGY
End: 2023-01-27

## 2023-01-27 DIAGNOSIS — Z48.817 ENCOUNTER FOR SURGICAL AFTERCARE FOLLOWING SURGERY ON THE SKIN AND SUBCUTANEOUS TISSUE: ICD-10-CM

## 2023-01-27 PROCEDURE — ? POST-OP WOUND EVALUATION

## 2023-01-27 ASSESSMENT — LOCATION SIMPLE DESCRIPTION DERM: LOCATION SIMPLE: NOSE

## 2023-01-27 ASSESSMENT — LOCATION DETAILED DESCRIPTION DERM: LOCATION DETAILED: RIGHT NASAL DORSUM

## 2023-01-27 ASSESSMENT — LOCATION ZONE DERM: LOCATION ZONE: NOSE

## 2023-01-28 ENCOUNTER — APPOINTMENT (RX ONLY)
Dept: URBAN - NONMETROPOLITAN AREA CLINIC 1 | Facility: CLINIC | Age: 73
Setting detail: DERMATOLOGY
End: 2023-01-28

## 2023-01-28 DIAGNOSIS — Z48.817 ENCOUNTER FOR SURGICAL AFTERCARE FOLLOWING SURGERY ON THE SKIN AND SUBCUTANEOUS TISSUE: ICD-10-CM

## 2023-01-28 PROCEDURE — ? POST-OP WOUND CHECK

## 2023-01-28 PROCEDURE — 99024 POSTOP FOLLOW-UP VISIT: CPT

## 2023-01-28 ASSESSMENT — LOCATION DETAILED DESCRIPTION DERM: LOCATION DETAILED: NASAL TIP

## 2023-01-28 ASSESSMENT — LOCATION ZONE DERM: LOCATION ZONE: NOSE

## 2023-01-28 ASSESSMENT — LOCATION SIMPLE DESCRIPTION DERM: LOCATION SIMPLE: NOSE

## 2023-01-28 NOTE — PROCEDURE: POST-OP WOUND CHECK
Detail Level: Detailed
Add 65844 Cpt? (Important Note: In 2017 The Use Of 15038 Is Being Tracked By Cms To Determine Future Global Period Reimbursement For Global Periods): yes

## 2023-02-02 DIAGNOSIS — I25.110 CORONARY ARTERY DISEASE INVOLVING NATIVE CORONARY ARTERY OF NATIVE HEART WITH UNSTABLE ANGINA PECTORIS (HCC): ICD-10-CM

## 2023-02-02 DIAGNOSIS — I20.0 ACCELERATING ANGINA (HCC): ICD-10-CM

## 2023-02-02 LAB
ANION GAP SERPL CALC-SCNC: 9 MMOL/L (ref 2–11)
BUN SERPL-MCNC: 11 MG/DL (ref 8–23)
CALCIUM SERPL-MCNC: 9.9 MG/DL (ref 8.3–10.4)
CHLORIDE SERPL-SCNC: 106 MMOL/L (ref 101–110)
CO2 SERPL-SCNC: 27 MMOL/L (ref 21–32)
CREAT SERPL-MCNC: 1.2 MG/DL (ref 0.8–1.5)
ERYTHROCYTE [DISTWIDTH] IN BLOOD BY AUTOMATED COUNT: 13.4 % (ref 11.9–14.6)
GLUCOSE SERPL-MCNC: 187 MG/DL (ref 65–100)
HCT VFR BLD AUTO: 42 % (ref 41.1–50.3)
HGB BLD-MCNC: 13.8 G/DL (ref 13.6–17.2)
MCH RBC QN AUTO: 29.3 PG (ref 26.1–32.9)
MCHC RBC AUTO-ENTMCNC: 32.9 G/DL (ref 31.4–35)
MCV RBC AUTO: 89.2 FL (ref 82–102)
NRBC # BLD: 0.02 K/UL (ref 0–0.2)
PLATELET # BLD AUTO: 233 K/UL (ref 150–450)
PMV BLD AUTO: 10.1 FL (ref 9.4–12.3)
POTASSIUM SERPL-SCNC: 4.7 MMOL/L (ref 3.5–5.1)
RBC # BLD AUTO: 4.71 M/UL (ref 4.23–5.6)
SODIUM SERPL-SCNC: 142 MMOL/L (ref 133–143)
WBC # BLD AUTO: 6.5 K/UL (ref 4.3–11.1)

## 2023-02-03 ENCOUNTER — TELEPHONE (OUTPATIENT)
Dept: CARDIOLOGY CLINIC | Age: 73
End: 2023-02-03

## 2023-02-03 NOTE — TELEPHONE ENCOUNTER
Cardiac Clearance        Physician or Practice Requesting:[facility]  : N/A  Contact Phone Number: [phone]  Fax Number: [phone]  Date of Surgery/Procedure: TBD  Type of Surgery or Procedure: Nose Reconstruction After MOHS  Type of Anesthesia: N/A  Type of Clearance Requested: Cardiac Clearance  Medication to Hold:N/A  Days to Hold: N/A

## 2023-02-08 NOTE — PROGRESS NOTES
Patient pre-assessment complete for [facility] scheduled with Dr. Chauncey Mercado, arrival time 0730. Patient verified using . Patient instructed to bring a list of all home medications on the day of procedure. NPO status reinforced. Patient informed to take a full dose aspirin 325mg  or 81 mg x 4 on the day of procedure. Patient instructed to HOLD Glipizide, Nesina, & Metformin,. Instructed they can take all other medications excluding vitamins & supplements. Patient verbalizes understanding of all instructions & denies any questions at this time.

## 2023-02-09 ENCOUNTER — HOSPITAL ENCOUNTER (OUTPATIENT)
Age: 73
Setting detail: OUTPATIENT SURGERY
Discharge: HOME | End: 2023-02-09
Attending: INTERNAL MEDICINE | Admitting: INTERNAL MEDICINE
Payer: OTHER GOVERNMENT

## 2023-02-09 ENCOUNTER — APPOINTMENT (OUTPATIENT)
Dept: NON INVASIVE DIAGNOSTICS | Age: 73
End: 2023-02-09
Attending: INTERNAL MEDICINE
Payer: OTHER GOVERNMENT

## 2023-02-09 VITALS
BODY MASS INDEX: 23.34 KG/M2 | HEART RATE: 80 BPM | DIASTOLIC BLOOD PRESSURE: 70 MMHG | SYSTOLIC BLOOD PRESSURE: 120 MMHG | RESPIRATION RATE: 18 BRPM | HEIGHT: 70 IN | WEIGHT: 163 LBS | OXYGEN SATURATION: 98 %

## 2023-02-09 DIAGNOSIS — I25.10 CORONARY ARTERY DISEASE, UNSPECIFIED VESSEL OR LESION TYPE, UNSPECIFIED WHETHER ANGINA PRESENT, UNSPECIFIED WHETHER NATIVE OR TRANSPLANTED HEART: ICD-10-CM

## 2023-02-09 PROBLEM — I10 ESSENTIAL HYPERTENSION: Chronic | Status: ACTIVE | Noted: 2022-12-06

## 2023-02-09 PROBLEM — E78.5 DYSLIPIDEMIA: Chronic | Status: ACTIVE | Noted: 2022-12-06

## 2023-02-09 PROBLEM — E11.9 TYPE 2 DIABETES MELLITUS (HCC): Chronic | Status: ACTIVE | Noted: 2022-12-06

## 2023-02-09 PROBLEM — C44.301 SKIN CANCER OF NOSE: Status: ACTIVE | Noted: 2023-02-09

## 2023-02-09 LAB
ECHO AO ASC DIAM: 3.5 CM
ECHO AO ASCENDING AORTA INDEX: 1.83 CM/M2
ECHO AO ROOT DIAM: 3.2 CM
ECHO AO ROOT INDEX: 1.68 CM/M2
ECHO AV AREA PEAK VELOCITY: 1.3 CM2
ECHO AV AREA VTI: 1.3 CM2
ECHO AV AREA/BSA PEAK VELOCITY: 0.7 CM2/M2
ECHO AV AREA/BSA VTI: 0.7 CM2/M2
ECHO AV MEAN GRADIENT: 9 MMHG
ECHO AV MEAN VELOCITY: 1.4 M/S
ECHO AV PEAK GRADIENT: 14 MMHG
ECHO AV PEAK VELOCITY: 1.9 M/S
ECHO AV VELOCITY RATIO: 0.42
ECHO AV VTI: 41.7 CM
ECHO BSA: 1.91 M2
ECHO BSA: 1.91 M2
ECHO IVC PROX: 2 CM
ECHO LA AREA 2C: 21 CM2
ECHO LA AREA 4C: 20.2 CM2
ECHO LA MAJOR AXIS: 5.6 CM
ECHO LA MINOR AXIS: 6.4 CM
ECHO LA VOL 2C: 58 ML (ref 18–58)
ECHO LA VOL 4C: 56 ML (ref 18–58)
ECHO LA VOL BP: 61 ML (ref 18–58)
ECHO LA VOL/BSA BIPLANE: 32 ML/M2 (ref 16–34)
ECHO LA VOLUME INDEX A2C: 30 ML/M2 (ref 16–34)
ECHO LA VOLUME INDEX A4C: 29 ML/M2 (ref 16–34)
ECHO LV E' LATERAL VELOCITY: 7 CM/S
ECHO LV E' SEPTAL VELOCITY: 6 CM/S
ECHO LV EDV A2C: 116 ML
ECHO LV EDV A4C: 163 ML
ECHO LV EDV INDEX A4C: 85 ML/M2
ECHO LV EDV NDEX A2C: 61 ML/M2
ECHO LV EJECTION FRACTION A2C: 41 %
ECHO LV EJECTION FRACTION A4C: 40 %
ECHO LV EJECTION FRACTION BIPLANE: 42 % (ref 55–100)
ECHO LV ESV A2C: 68 ML
ECHO LV ESV A4C: 98 ML
ECHO LV ESV INDEX A2C: 36 ML/M2
ECHO LV ESV INDEX A4C: 51 ML/M2
ECHO LV FRACTIONAL SHORTENING: 23 % (ref 28–44)
ECHO LV INTERNAL DIMENSION DIASTOLE INDEX: 2.51 CM/M2
ECHO LV INTERNAL DIMENSION DIASTOLIC: 4.8 CM (ref 4.2–5.9)
ECHO LV INTERNAL DIMENSION SYSTOLIC INDEX: 1.94 CM/M2
ECHO LV INTERNAL DIMENSION SYSTOLIC: 3.7 CM
ECHO LV IVSD: 1.1 CM (ref 0.6–1)
ECHO LV MASS 2D: 194 G (ref 88–224)
ECHO LV MASS INDEX 2D: 101.6 G/M2 (ref 49–115)
ECHO LV POSTERIOR WALL DIASTOLIC: 1.1 CM (ref 0.6–1)
ECHO LV RELATIVE WALL THICKNESS RATIO: 0.46
ECHO LVOT AREA: 3.1 CM2
ECHO LVOT AV VTI INDEX: 0.4
ECHO LVOT DIAM: 2 CM
ECHO LVOT MEAN GRADIENT: 1 MMHG
ECHO LVOT PEAK GRADIENT: 2 MMHG
ECHO LVOT PEAK VELOCITY: 0.8 M/S
ECHO LVOT STROKE VOLUME INDEX: 27.6 ML/M2
ECHO LVOT SV: 52.8 ML
ECHO LVOT VTI: 16.8 CM
ECHO MV A VELOCITY: 1.01 M/S
ECHO MV AREA VTI: 1.7 CM2
ECHO MV E DECELERATION TIME (DT): 217 MS
ECHO MV E VELOCITY: 0.56 M/S
ECHO MV E/A RATIO: 0.55
ECHO MV E/E' LATERAL: 8
ECHO MV E/E' RATIO (AVERAGED): 8.67
ECHO MV E/E' SEPTAL: 9.33
ECHO MV LVOT VTI INDEX: 1.82
ECHO MV MAX VELOCITY: 0.9 M/S
ECHO MV MEAN GRADIENT: 1 MMHG
ECHO MV MEAN VELOCITY: 0.5 M/S
ECHO MV PEAK GRADIENT: 3 MMHG
ECHO MV VTI: 30.5 CM
ECHO PV ACCELERATION TIME (AT): 95 MS
ECHO PV MAX VELOCITY: 1.2 M/S
ECHO PV PEAK GRADIENT: 6 MMHG
ECHO RV BASAL DIMENSION: 2.8 CM
ECHO RV FREE WALL PEAK S': 12 CM/S
ECHO RV TAPSE: 2.3 CM (ref 1.7–?)
EKG ATRIAL RATE: 65 BPM
EKG DIAGNOSIS: NORMAL
EKG P AXIS: 44 DEGREES
EKG P-R INTERVAL: 174 MS
EKG Q-T INTERVAL: 426 MS
EKG QRS DURATION: 98 MS
EKG QTC CALCULATION (BAZETT): 443 MS
EKG R AXIS: -14 DEGREES
EKG T AXIS: -17 DEGREES
EKG VENTRICULAR RATE: 65 BPM
GLUCOSE BLD STRIP.AUTO-MCNC: 154 MG/DL (ref 65–100)
LV EF: 43 %
LVEF MODALITY: ABNORMAL
SERVICE CMNT-IMP: ABNORMAL

## 2023-02-09 PROCEDURE — 2500000003 HC RX 250 WO HCPCS: Performed by: INTERNAL MEDICINE

## 2023-02-09 PROCEDURE — A4216 STERILE WATER/SALINE, 10 ML: HCPCS | Performed by: INTERNAL MEDICINE

## 2023-02-09 PROCEDURE — 93458 L HRT ARTERY/VENTRICLE ANGIO: CPT | Performed by: INTERNAL MEDICINE

## 2023-02-09 PROCEDURE — C1760 CLOSURE DEV, VASC: HCPCS | Performed by: INTERNAL MEDICINE

## 2023-02-09 PROCEDURE — 2580000003 HC RX 258: Performed by: INTERNAL MEDICINE

## 2023-02-09 PROCEDURE — C1769 GUIDE WIRE: HCPCS | Performed by: INTERNAL MEDICINE

## 2023-02-09 PROCEDURE — 6360000004 HC RX CONTRAST MEDICATION: Performed by: INTERNAL MEDICINE

## 2023-02-09 PROCEDURE — 93005 ELECTROCARDIOGRAM TRACING: CPT | Performed by: INTERNAL MEDICINE

## 2023-02-09 PROCEDURE — 6360000002 HC RX W HCPCS: Performed by: INTERNAL MEDICINE

## 2023-02-09 PROCEDURE — 99152 MOD SED SAME PHYS/QHP 5/>YRS: CPT | Performed by: INTERNAL MEDICINE

## 2023-02-09 PROCEDURE — C1894 INTRO/SHEATH, NON-LASER: HCPCS | Performed by: INTERNAL MEDICINE

## 2023-02-09 PROCEDURE — 93306 TTE W/DOPPLER COMPLETE: CPT | Performed by: INTERNAL MEDICINE

## 2023-02-09 PROCEDURE — 82962 GLUCOSE BLOOD TEST: CPT

## 2023-02-09 PROCEDURE — 2709999900 HC NON-CHARGEABLE SUPPLY: Performed by: INTERNAL MEDICINE

## 2023-02-09 PROCEDURE — C8929 TTE W OR WO FOL WCON,DOPPLER: HCPCS

## 2023-02-09 RX ORDER — ASPIRIN 81 MG/1
324 TABLET, CHEWABLE ORAL DAILY
Status: DISCONTINUED | OUTPATIENT
Start: 2023-02-09 | End: 2023-02-09 | Stop reason: HOSPADM

## 2023-02-09 RX ORDER — NITROGLYCERIN 20 MG/100ML
INJECTION INTRAVENOUS PRN
Status: DISCONTINUED | OUTPATIENT
Start: 2023-02-09 | End: 2023-02-09 | Stop reason: HOSPADM

## 2023-02-09 RX ORDER — SODIUM CHLORIDE 9 MG/ML
INJECTION, SOLUTION INTRAVENOUS CONTINUOUS
Status: DISCONTINUED | OUTPATIENT
Start: 2023-02-09 | End: 2023-02-09 | Stop reason: HOSPADM

## 2023-02-09 RX ORDER — MIDAZOLAM HYDROCHLORIDE 1 MG/ML
INJECTION INTRAMUSCULAR; INTRAVENOUS PRN
Status: DISCONTINUED | OUTPATIENT
Start: 2023-02-09 | End: 2023-02-09 | Stop reason: HOSPADM

## 2023-02-09 RX ORDER — LIDOCAINE HYDROCHLORIDE 10 MG/ML
INJECTION, SOLUTION INFILTRATION; PERINEURAL PRN
Status: DISCONTINUED | OUTPATIENT
Start: 2023-02-09 | End: 2023-02-09 | Stop reason: HOSPADM

## 2023-02-09 RX ORDER — HEPARIN SODIUM 200 [USP'U]/100ML
INJECTION, SOLUTION INTRAVENOUS CONTINUOUS PRN
Status: DISCONTINUED | OUTPATIENT
Start: 2023-02-09 | End: 2023-02-09 | Stop reason: HOSPADM

## 2023-02-09 RX ADMIN — PERFLUTREN 0.45 ML: 6.52 INJECTION, SUSPENSION INTRAVENOUS at 11:25

## 2023-02-09 NOTE — PROGRESS NOTES
TRANSFER - OUT REPORT:    Verbal report given to RN on Frank Delgado  being transferred to [facility] for routine progression of patient care       Report consisted of patient's Situation, Background, Assessment and   Recommendations(SBAR). Information from the following report(s) Nurse Handoff Report was reviewed with the receiving nurse. [place] Assessment: No data recorded  Lines:   Peripheral IV 02/09/23 Right Antecubital (Active)       Peripheral IV 02/09/23 Left Antecubital (Active)        Opportunity for questions and clarification was provided.       Patient transported with:  Registered Nurse    [facility] w [address] [facility]  CV Surg consult  RRA - 10 mls  RFA - 6 fr Angioseal    2 mg Versed  5000 units Heparin

## 2023-02-09 NOTE — Clinical Note
Catheter exchanged over exchange length wire with CATHETER DIAG AD 5FR L110CM 145DEG NATALI MAIYTO [facility].

## 2023-02-09 NOTE — PROGRESS NOTES
Discharge instructions given per orders, voiced good understanding of R wrist and R groin care, medications & follow up care.  Denies any questions

## 2023-02-09 NOTE — CONSULTS
Pina Wiley. MD Negrita Love. Lakesha Beyer         1/10/2023        1950    REFERRING PHYSICIAN:  Dr. Bronwen Councilman:  The patient is a 75 y.o. male who was admitted for elective LHC. He has a history of CAD with previous stents placed in 2012 in [place]. For at least a year, he has had intermittent chest pain, fatigue and dyspnea. He was scheduled for a heart cath the end of 2022 but this was postponed due to URI. 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 His cath today shows severe distal left main stenosis at least 80% which he has compromising flow to the LAD ramus and circumflex. Totally occluded mid right with collateral filling of distal right from the distal circumflex. Mildly depressed ejection fraction 45 to 50%. CABG has been recommended. Risk factors include DM, HTN, dyslipidemia     No history of stroke, TIA, prior cardiac surgery, prior vascular surgery, anesthetic complication, lung disease, DVT or PE, GI bleeding       Past Medical History:   Diagnosis Date    Coronary artery disease     Dyslipidemia     Environmental allergies     Essential hypertension     Type 2 diabetes mellitus (HCC)        Past Surgical History:   Procedure Laterality Date    DIAGNOSTIC CARDIAC CATH LAB PROCEDURE      PTCA         Family History   Problem Relation Age of Onset    Heart Attack Mother     Dementia Mother     Other Father         choked    Other Sister         ? complications from encephalitis    No Known Problems Brother        Social History     Socioeconomic History    Marital status:      Spouse name: Not on file    Number of children: Not on file    Years of education: Not on file    Highest education level: Not on file   Occupational History    Occupation: retired from [employer].  also served in the [employer]   Tobacco Use    Smoking status: Former     Types: Cigarettes     Quit date: 0     Years since quittin.1     Passive exposure: Current    Smokeless tobacco: Never   Substance and Sexual Activity    Alcohol use: Never    Drug use: Never    Sexual activity: Not on file   Other Topics Concern    Not on file   Social History Narrative    Not on file     Social Determinants of Health     Financial Resource Strain: Not on file   Food Insecurity: Not on file   Transportation Needs: Not on file   Physical Activity: Not on file   Stress: Not on file   Social Connections: Not on file   Intimate Partner Violence: Not on file   Housing Stability: Not on file       Allergies   Allergen Reactions    Ampicillin Hives    Farxiga [Dapagliflozin] Hives    Sulfa Antibiotics Hives       No current facility-administered medications on file prior to encounter.      Current Outpatient Medications on File Prior to Encounter   Medication Sig Dispense Refill    losartan (COZAAR) 100 MG tablet Take 1 tablet by mouth daily 90 tablet 3    glipiZIDE (GLUCOTROL) 10 MG tablet Take 1 tablet by mouth daily (with breakfast) 60 tablet     alogliptin (NESINA) 12.5 MG TABS tablet Take 12.5 mg by mouth daily      acetaminophen (TYLENOL) 325 MG tablet       Artificial Tear Solution (JUST TEARS EYE DROPS) SOLN       aspirin 81 MG EC tablet Take 81 mg by mouth daily      vitamin D3 (CHOLECALCIFEROL) 10 MCG (400 UNIT) TABS tablet Take 400 Units by mouth daily      cyanocobalamin 1000 MCG tablet Take 1,000 mcg by mouth daily      ferrous sulfate (IRON 325) 325 (65 Fe) MG tablet       finasteride (PROSCAR) 5 MG tablet Take 5 mg by mouth daily      HYDROcodone-acetaminophen (NORCO)  MG per tablet TAKE 1 TABLET BY MOUTH EVERY 8 HOURS AS NEEDED FOR MODERATE PAIN OR CHRONIC PAIN      insulin glargine (LANTUS;BASAGLAR) 100 UNIT/ML injection pen Inject 16 Units into the skin daily      SURE COMFORT PEN NEEDLES 32G X 6 MM MISC       metFORMIN (GLUCOPHAGE) 1000 MG tablet       magnesium oxide (MAG-OX) 400 MG tablet Take 1 tablet by mouth 2 times daily      rosuvastatin (CRESTOR) 40 MG tablet Take 40 mg by mouth daily      naloxone 4 MG/0.1ML LIQD nasal spray 1 spray once as needed      miconazole (MICOTIN) 2 % powder       metoprolol succinate (TOPROL XL) 25 MG extended release tablet Take 1 tablet by mouth daily 90 tablet 3    isosorbide mononitrate (IMDUR) 30 MG extended release tablet Take 1 tablet by mouth daily 90 tablet 3    nitroGLYCERIN (NITROSTAT) 0.4 MG SL tablet Place 1 tablet under the tongue every 5 minutes as needed for Chest pain (X 3 DOSES, THEN CALL 911) up to max of 3 total doses. If no relief after 1 dose, call 911. 25 tablet 5       REVIEW OF SYSTEMS:  Review of Systems - History obtained from the patient  General ROS: positive for  - fatigue  Endocrine ROS: positive for - none  Respiratory ROS: no cough, shortness of breath, or wheezing  Cardiovascular ROS: positive for - chest pain and dyspnea on exertion  Gastrointestinal ROS: no abdominal pain, change in bowel habits, or black or bloody stools  Musculoskeletal ROS: negative  Dermatological ROS: positive for - dry skin and cancerous lesion on his nose     Physical Exam  Vitals:    02/09/23 1015 02/09/23 1030 02/09/23 1045 02/09/23 1100   BP: (!) 105/58 121/69 (!) 86/54 138/87   Pulse: 59 67 67 75   Resp:       SpO2: 94% 96% 95% 98%   Weight:       Height:           Physical Exam:  General: Well Developed, Well Nourished, No Acute Distress  HEENT: Normocephalic, pupils equal and round, no scleral icterus  Neck: supple, no JVD, no carotid bruits, no thyromegaly or adenopathy  Chest wall: No deformity  Heart: S1S2 with RRR without murmurs or gallops  Lungs: some crackles from the posterior bilaterally   Vascular: Pulses are full and equal. There is no venous stasis disease.   Abd: soft, nontender, nondistended, with good bowel sounds, no pulsatile masses  Ext: warm, no edema, calves supple/nontender, pulses 2+ bilaterally  Skin: warm and dry, no rashes, cyanosis, jaundice, ecchymoses or evidence of skin breakdown  Psychiatric: Normal mood and affect  Neurologic: Alert and oriented X 3, no focal deficit noted    LHC  Severe distal left main stenosis at least 80% which she has compromising flow to the LAD ramus and circumflex  Totally occluded mid right with collateral filling of distal right from the distal circumflex  Mildly depressed ejection fraction 45 to 50%  Labs: The patient has NYHA Class IIIC symptoms. The mortality risk is 0.9%      In this split/shared patient encounter, I personally reviewed the patient's past medical history, conducted a current medical history and review of systems, and conducted a medically appropriate physical exam. I reviewed the available labs, prior imaging, and current medication list.     Hernán Thakkar, APRN - CNP-C    Assessment:     Active Hospital Problems    Skin cancer of nose      Coronary artery disease      Type 2 diabetes mellitus ([facility] [address]      Essential hypertension      Dyslipidemia      Accelerating angina [facility])        Plan:     Guru Canas is to see preoperative teaching film that thoroughly discusses procedure, risks, and possible complications. Risks, benefits, and alternatives were discussed to include, but not limited to:     Bleeding  Arrhythmia   Infection including mediastinitis  Myocardial infarction  Need for reoperation  Renal failure  Respiratory failure  Stroke  Potential death      I have recommended CABG. He has a planned dermatology procedure on Feb 20th which is going to be the first of two needed surgeries for nasal reconstruction. Timing of CABG per Dr. Ronnell Miller. The patient agrees to proceed. In this split/shared patient encounter, I personally viewed the cardiac catheterization films and echocardiogram. I reviewed the current and past patient medical history obtained by Hernán Thakkar.  I reviewed the available labs and current medication list. I discussed the cardiac catheterization and echocardiogram results and educated the patient/family on treatment options. I ordered additional tests and procedures as indicated and coordinated care for the selected treatment plan, which accounted for greater than 50% of the total 30 minutes of clinical time.     After exam I agree with above

## 2023-02-09 NOTE — H&P
Lizzy Carranza MD   Physician   Specialty:  Cardiology   Progress Notes       Signed   Encounter Date:  1/10/2023                 Signed        Expand All Collapse All                                                                                                                                                                                                                                                                                                                                                                                                                                                                                                                                                                                                                      [facility] CARDIOLOGY  [address], [address]  PHONE: [phone]        01/10/23     NAME:  Gabby Amin  : 1950  MRN: [id number]            SUBJECTIVE:   Gabby Amin is a 75 y.o. male seen for a follow up visit regarding the following:           Chief Complaint   Patient presents with    Fatigue       1 month visit and med review. HPI:  Follow up  Fatigue (1 month visit and med review.  )   . Was supposed to have had LHC for accelerating angina but patient developed URI and canceled it, hasn't yet rescheduled, states he was unable to get dermatologist to move up the procedure, we had contacted them to expedite it but it's still scheduled for 2 weeks from now, using a traveling surgeon, apparently, who was not able to reschedule. He has had no anginal quality chest pain, continues to note dyspnea and morning sputum production but better than previous. Fax note to dermatology practice: Soo Swartz PA-C   [phone]           PAST CARDIAC HISTORY:          NSTEMI - coronary stents x 2  Oct 2022       TMET (at [facility]) - 1.56 seconds, 102% HR, equivocal ST changes.  Reproduced anginal symptoms Deconditioned. Inconclusive study  Dec 2022       [facility] - canceled d/t URI                 Key CAD CHF Meds               losartan (COZAAR) 100 MG tablet (Taking)     Sig - Route: Take 1 tablet by mouth daily - Oral     rosuvastatin (CRESTOR) 40 MG tablet (Taking)     Class: Historical Med     metoprolol succinate (TOPROL XL) 25 MG extended release tablet (Taking)     Sig - Route: Take 1 tablet by mouth daily - Oral             Key Antihyperglycemic Medications               dapagliflozin (FARXIGA) 10 MG tablet (Taking)     Sig - Route: Take 1 tablet by mouth every morning - Oral     glipiZIDE (GLUCOTROL) 10 MG tablet (Taking)     Class: Historical Med     alogliptin (NESINA) 12.5 MG TABS tablet (Taking)     Class: Historical Med     insulin glargine (LANTUS;BASAGLAR) 100 UNIT/ML injection pen (Taking)     Class: Historical Med     metFORMIN (GLUCOPHAGE) 1000 MG tablet (Taking)     Class: Historical Med                      Past Medical History, Past Surgical History, Family history, Social History, and Medications were all reviewed with the patient today and updated as necessary. Home Medications           Prior to Admission medications    Medication Sig Start Date End Date Taking?  Authorizing Provider   dapagliflozin (FARXIGA) 10 MG tablet Take 1 tablet by mouth every morning 1/10/23   Yes Emma Zapata MD   losartan (COZAAR) 100 MG tablet Take 1 tablet by mouth daily 1/10/23   Yes Emma Zapata MD   glipiZIDE (GLUCOTROL) 10 MG tablet Take 1 tablet by mouth daily (with breakfast) 1/10/23   Yes Emma Zapaat MD   alogliptin (NESINA) 12.5 MG TABS tablet Take 12.5 mg by mouth daily 1/28/21   Yes Historical Provider, MD   acetaminophen (TYLENOL) 325 MG tablet   4/16/21   Yes Historical Provider, MD   Artificial Tear Solution ([address]) SOLN   7/27/22   Yes Historical Provider, MD   aspirin 81 MG EC tablet Take 81 mg by mouth daily 4/28/16   Yes Historical Provider, MD vitamin D3 (CHOLECALCIFEROL) 10 MCG (400 UNIT) TABS tablet Take 400 Units by mouth daily     Yes Historical Provider, MD   cyanocobalamin 1000 MCG tablet Take 1,000 mcg by mouth daily     Yes Historical Provider, MD   ferrous sulfate (IRON 325) 325 (65 Fe) MG tablet   7/27/22   Yes Historical Provider, MD   finasteride (PROSCAR) 5 MG tablet Take 5 mg by mouth daily 6/21/19   Yes Historical Provider, MD   HYDROcodone-acetaminophen ([facility])  MG per tablet TAKE 1 TABLET BY MOUTH EVERY 8 HOURS AS NEEDED FOR MODERATE PAIN OR CHRONIC PAIN 11/13/22   Yes Historical Provider, MD   insulin glargine (LANTUS;BASAGLAR) 100 UNIT/ML injection pen Inject 16 Units into the skin daily 7/27/22   Yes Historical Provider, MD   SURE COMFORT PEN NEEDLES 32G X 6 MM [facility]   10/17/22   Yes Historical Provider, MD   metFORMIN (GLUCOPHAGE) 1000 MG tablet   4/16/21   Yes Historical Provider, MD   magnesium oxide (MAG-OX) 400 MG tablet Take 1 tablet by mouth 2 times daily 7/27/22 2/24/23 Yes Historical Provider, MD   rosuvastatin (CRESTOR) 40 MG tablet Take 40 mg by mouth daily     Yes Historical Provider, MD   naloxone 4 MG/0.1ML LIQD nasal spray 1 spray once as needed 10/13/21   Yes Historical Provider, MD   miconazole (MICOTIN) 2 % powder   7/27/22   Yes Historical Provider, MD   metoprolol succinate (TOPROL XL) 25 MG extended release tablet Take 1 tablet by mouth daily 12/7/22   Yes Aysha Carr MD   isosorbide mononitrate (IMDUR) 30 MG extended release tablet Take 1 tablet by mouth daily 12/7/22   Yes Aysha Carr MD   nitroGLYCERIN (NITROSTAT) 0.4 MG SL tablet Place 1 tablet under the tongue every 5 minutes as needed for Chest pain (X 3 DOSES, THEN CALL 911) up to max of 3 total doses.  If no relief after 1 dose, call 911. 12/7/22   Yes Aysha Carr MD              Allergies   Allergen Reactions    Ampicillin Hives    Sulfa Antibiotics Hives      Past Medical History        Past Medical History: Diagnosis Date    Coronary artery disease      Dyslipidemia      Environmental allergies      Essential hypertension      Type 2 diabetes mellitus ([facility] [address]           Past Surgical History         Past Surgical History:   Procedure Laterality Date    DIAGNOSTIC CARDIAC CATH LAB PROCEDURE        PTCA             Family History   History reviewed. No pertinent family history. Social History            Tobacco Use    Smoking status: Former       Types: Cigarettes       Quit date: 1972       Years since quittin.0       Passive exposure: Current    Smokeless tobacco: Never   Substance Use Topics    Alcohol use: Never         ROS:     Review of Systems   Constitutional: Positive for malaise/fatigue. Cardiovascular:  Negative for chest pain and leg swelling. Respiratory:  Positive for cough, shortness of breath and sputum production. PHYSICAL EXAM:   /64   Pulse 84   Ht 5' 10\" (1.778 m)   Wt 163 lb 12.8 oz (74.3 kg) Comment: with shoes  BMI 23.50 kg/m²           Wt Readings from Last 3 Encounters:   01/10/23 163 lb 12.8 oz (74.3 kg)   22 163 lb (73.9 kg)          BP Readings from Last 3 Encounters:   01/10/23 126/64   22 138/82          Pulse Readings from Last 3 Encounters:   01/10/23 84   22 84               Physical Exam  Constitutional:       Appearance: Normal appearance. HENT:      Head: Normocephalic and atraumatic. Eyes:      Conjunctiva/sclera: Conjunctivae normal.   Neck:      Vascular: No carotid bruit. Cardiovascular:      Rate and Rhythm: Normal rate and regular rhythm. Heart sounds: No murmur heard. No friction rub. No gallop. Pulmonary:      Effort: No respiratory distress. Breath sounds: No wheezing or rales. Musculoskeletal:         General: No swelling. Cervical back: Neck supple. Skin:     General: Skin is warm and dry. Neurological:      General: No focal deficit present. Mental Status: He is alert.    Psychiatric: Mood and Affect: Mood normal.         Behavior: Behavior normal.         Medical problems and test results were reviewed with the patient today. DATA REVIEW           BMP        Lab Results   Component Value Date/Time      12/07/2022 11:11 AM     K 4.4 12/07/2022 11:11 AM      12/07/2022 11:11 AM     CO2 27 12/07/2022 11:11 AM     BUN 16 12/07/2022 11:11 AM     CREATININE 1.30 12/07/2022 11:11 AM     GLUCOSE 292 12/07/2022 11:11 AM     CALCIUM 10.2 12/07/2022 11:11 AM            EKG           CXR/IMAGING           DEVICE INTERROGATION           OUTSIDE RECORDS REVIEW     Records from outside providers have been reviewed and summarized as noted in the HPI, past history and data review sections of this note, and reviewed with patient. .         ASSESSMENT and Lalo Montana was seen today for fatigue. Diagnoses and all orders for this visit:     Accelerating angina [facility])  -     Basic Metabolic Panel; Future  -     CBC; Future     Coronary artery disease involving native coronary artery of native heart with unstable angina pectoris ([facility] [address]  -     Basic Metabolic Panel; Future  -     CBC; Future     Essential hypertension     Dyslipidemia     Type 2 diabetes mellitus without complication, with long-term current use of insulin ([facility] [address]     Other orders  -     dapagliflozin (FARXIGA) 10 MG tablet; Take 1 tablet by mouth every morning  -     losartan (COZAAR) 100 MG tablet; Take 1 tablet by mouth daily           IMPRESSION:     Patient had stress test at the [place], results above per HPI, indicating at best deconditioning and at worst multivessel disease. As a diabetic, he may have mortality benefit from revascularization depending on his anatomy. Symptoms have improved though persist on current medical therapy, and he is anxious to have skin cancer surgery on the 26th which apparently he was told cannot be expedited. Discussed optimal medical therapy (OMT).   He is currently on high intensity statin therapy, ARB, (low dose) BB and aspirin. Recommend addition of SGLT2i therapy in this diabetic with established coronary artery disease for improved cardiovascular outcomes. After starting [place], advised to reduce his glipizide to once daily and check glucose at home 4 times a day until follow up, notify immediately for hypoglycemia and follow up with PCP ASAP to help guide other DM therapy with this change. His PCP has tried previously to put him on this therapy but reports [facility] \"wouldn't [facility]\". As noted, clearly indicated as guideline directed therapy. Asked patient to communicate about cost to allow prior authorization process or patient assistance applications. Advised with addition of Farxiga, removing hctz from his ARB and will get labs both for this and pre-cath in 2 weeks. While he remains low risk for dermatologic surgery, he should continue low dose ASA throughout d/t elevated ischemic risk and low risk of major bleeding. Once established on Farxiga with removal of diuretic, will plan to push BB to aim for resting heart rate 50-60 for ongoing OMT of underlying CAD. Rescheduling [facility] for after skin cancer removal.            Return for AFTER PROCEDURE TO REVIEW RESULTS. Thank you for allowing me to participate in this patient's care. Please call or contact me if there are any questions or concerns regarding the above.        Nancy Patel MD  01/10/23  10:10 AM                 Office Visit on 1/10/2023    Office Visit on 1/10/2023      Detailed Report    Note shared with patient  Additional Documentation    Vitals:  /64  Pulse 84  Ht 5' 10\" (1.778 m)  Wt 163 lb 12.8 oz (74.3 kg)   BMI 23.50 kg/m²  BSA 1.92 m²  Pain Sc   0 - No pain     More Vitals   Flowsheets:  Calorie Assessment,  PHQ-2     Encounter Info:  Billing Info,  History,  Allergies,  Detailed Report       Progress Notes Info    Author Note Status Last Update User Last Update Date/Time Miroslava Fonseca MD Signed Miroslava Fonseca MD 1/10/2023 10:11 AM     Chart Review Routing History    No routing history on file.   Orders Placed      Basic Metabolic Panel    CBC     All Encounter Results     Medication Changes      Dapagliflozin Propanediol 10 mg Oral EVERY MORNING    Losartan Potassium 100 mg Oral DAILY    glipiZIDE 10 mg Oral DAILY WITH BREAKFAST    Medication List     Visit Diagnoses      Accelerating angina [facility])    Coronary artery disease involving native coronary artery of native heart with unstable angina pectoris ([facility] [address]    Essential hypertension    Dyslipidemia    Type 2 diabetes mellitus without complication, with long-term current use of insulin ([facility] [address]     Problem List

## 2023-02-09 NOTE — PROGRESS NOTES
TRANSFER - IN REPORT:    Verbal report received from [facility] on Je Varela being received from [facility] for routine post-op      Report consisted of patients Situation, Background, Assessment and Recommendations(SBAR). Information from the following report(s) Procedure Summary was reviewed with the receiving nurse. Opportunity for questions and clarification was provided. Assessment completed upon patients arrival to unit and care assumed.

## 2023-02-09 NOTE — Clinical Note
Catheter exchanged over exchange length wire with CATHETER DIAG 5FR L100CM LUMN [id number] JR4 CRV 0 SIDE H.

## 2023-02-09 NOTE — DISCHARGE INSTRUCTIONS
HEART CATHETERIZATION/ANGIOGRAPHY DISCHARGE INSTRUCTIONS    Check puncture site frequently for swelling or bleeding. If there is any bleeding, apply pressure over the area with a clean towel or washcloth and call 911. Notify your doctor for any redness, swelling, drainage, or oozing from the puncture site. Notify your doctor for any fever or chills. If the extremity becomes cold, numb, or painful call [facility]  Activity should be limited for the next 48 hours. No heavy lifting, pushing, pulling  or strenuous activity for 48 hours. No heavy lifting (anything over 10 pounds) for 3 days. You may resume your usual diet. Drink more fluids than usual.  Have a responsible person drive you home and stay with you for at least 24 hours after your heart catheterization/angiography. You may remove bandage from your R wrist and R groin in 24 hours. You may shower in 24 hours. No tub baths, hot tubs, or swimming for 1 week. Do not place any lotions, creams, powders, or ointments over puncture site for 1 week. You may place a clean band-aid over the puncture site each day for 5 days. Change daily. Sedation for a Medical Procedure: Care Instructions     You were given a sedative medication during your visit. While many of the effects will have worn   off before you leave; you may continue to feel some effects for several hours. Common side effects from sedation include:  Feeling sleepy. (Your doctors and nurses will make sure you are not too sleepy to go home.)  Nausea and vomiting. This usually does not last long. Feeling tired. How can you care for yourself at home? Activity    Don't do anything for 24 hours that requires attention to detail. It takes time for the medicine effects to completely wear off. Do not make important legal decisions for 24 hours. Do not sign any legal documents for 24 hours.      Do not drink alcohol today     For your safety, you should not drive or operate heavy machinery for the remainder of the day     Rest when you feel tired. Getting enough sleep will help you recover. Diet    You can eat your normal diet, unless your doctor gives you other instructions. If your stomach is upset, try clear liquids and bland, low-fat foods like plain toast or rice. Drink plenty of fluids (unless your doctor tells you not to). Don't drink alcohol for 24 hours. Medicines    Be safe with medicines. Read and follow all instructions on the label. If the doctor gave you a prescription medicine for pain, take it as prescribed. If you are not taking a prescription pain medicine, ask your doctor if you can take an over-the-counter medicine. If you think your pain medicine is making you sick to your stomach: Take your medicine after meals (unless your doctor has told you not to). Ask your doctor for a different pain medicine. I have read the above instructions and have had the opportunity to ask questions.       Patient: ________________________   Date: _____________    Witness: _______________________   Date: _____________

## 2023-02-09 NOTE — Clinical Note
Accessed site: right femoral artery. Femoral access needle used. Access guided by fluoro. 1 attempt(s) to obtain access. Access was successfully obtained.

## 2023-02-09 NOTE — PROGRESS NOTES
Patient received to [address] [place] [address] room # 17. Ambulatory from [facility]. Patient scheduled for [facility] today with Dr Ludin Huerta. Procedure reviewed & questions answered, voiced good understanding consent obtained & placed on chart. All medications and medical history reviewed. Will prep patient per orders. Patient & family updated on plan of care. The patient has a fraility score of 3-MANAGING WELL, based on pt ability to ambulate independently and to complete own ADLs.

## 2023-02-10 ENCOUNTER — HOSPITAL ENCOUNTER (OUTPATIENT)
Dept: ULTRASOUND IMAGING | Age: 73
Discharge: HOME | End: 2023-02-13

## 2023-02-10 DIAGNOSIS — Z01.811 PRE-OP CHEST EXAM: ICD-10-CM

## 2023-02-12 RX ORDER — AMIODARONE HYDROCHLORIDE 200 MG/1
600 TABLET ORAL ONCE
Qty: 3 TABLET | Refills: 0 | Status: SHIPPED | OUTPATIENT
Start: 2023-02-12 | End: 2023-02-12

## 2023-02-13 ENCOUNTER — HOSPITAL ENCOUNTER (OUTPATIENT)
Dept: PREADMISSION TESTING | Age: 73
Discharge: HOME | End: 2023-02-16
Payer: OTHER GOVERNMENT

## 2023-02-13 ENCOUNTER — HOSPITAL ENCOUNTER (OUTPATIENT)
Dept: ULTRASOUND IMAGING | Age: 73
Discharge: HOME | End: 2023-02-16
Payer: OTHER GOVERNMENT

## 2023-02-13 ENCOUNTER — HOSPITAL ENCOUNTER (OUTPATIENT)
Dept: GENERAL RADIOLOGY | Age: 73
Discharge: HOME | End: 2023-02-16
Payer: OTHER GOVERNMENT

## 2023-02-13 VITALS
SYSTOLIC BLOOD PRESSURE: 154 MMHG | HEART RATE: 106 BPM | TEMPERATURE: 97.9 F | HEIGHT: 70 IN | RESPIRATION RATE: 20 BRPM | BODY MASS INDEX: 23.56 KG/M2 | DIASTOLIC BLOOD PRESSURE: 82 MMHG | WEIGHT: 164.6 LBS | OXYGEN SATURATION: 95 %

## 2023-02-13 DIAGNOSIS — Z01.811 PRE-OP CHEST EXAM: ICD-10-CM

## 2023-02-13 LAB
ALBUMIN SERPL-MCNC: 4.1 G/DL (ref 3.2–4.6)
ALBUMIN/GLOB SERPL: 1.1 (ref 0.4–1.6)
ALP SERPL-CCNC: 43 U/L (ref 50–136)
ALT SERPL-CCNC: 33 U/L (ref 12–65)
ANION GAP SERPL CALC-SCNC: 7 MMOL/L (ref 2–11)
APPEARANCE UR: CLEAR
APTT PPP: 26 SEC (ref 24.5–34.2)
AST SERPL-CCNC: 18 U/L (ref 15–37)
BACTERIA SPEC CULT: NORMAL
BACTERIA URNS QL MICRO: 0 /HPF
BILIRUB SERPL-MCNC: 0.4 MG/DL (ref 0.2–1.1)
BILIRUB UR QL: ABNORMAL
BUN SERPL-MCNC: 14 MG/DL (ref 8–23)
CALCIUM SERPL-MCNC: 10 MG/DL (ref 8.3–10.4)
CASTS URNS QL MICRO: ABNORMAL /LPF
CHLORIDE SERPL-SCNC: 105 MMOL/L (ref 101–110)
CO2 SERPL-SCNC: 26 MMOL/L (ref 21–32)
COLOR UR: ABNORMAL
CREAT SERPL-MCNC: 1.2 MG/DL (ref 0.8–1.5)
EKG ATRIAL RATE: 82 BPM
EKG DIAGNOSIS: NORMAL
EKG P AXIS: 56 DEGREES
EKG P-R INTERVAL: 162 MS
EKG Q-T INTERVAL: 362 MS
EKG QRS DURATION: 96 MS
EKG QTC CALCULATION (BAZETT): 422 MS
EKG R AXIS: -22 DEGREES
EKG T AXIS: 23 DEGREES
EKG VENTRICULAR RATE: 82 BPM
EPI CELLS #/AREA URNS HPF: ABNORMAL /HPF
ERYTHROCYTE [DISTWIDTH] IN BLOOD BY AUTOMATED COUNT: 13.2 % (ref 11.9–14.6)
EST. AVERAGE GLUCOSE BLD GHB EST-MCNC: 183 MG/DL
GLOBULIN SER CALC-MCNC: 3.7 G/DL (ref 2.8–4.5)
GLUCOSE SERPL-MCNC: 281 MG/DL (ref 65–100)
GLUCOSE UR STRIP.AUTO-MCNC: >1000 MG/DL
HBA1C MFR BLD: 8 % (ref 4.8–5.6)
HCT VFR BLD AUTO: 41.8 % (ref 41.1–50.3)
HGB BLD-MCNC: 13.9 G/DL (ref 13.6–17.2)
HGB UR QL STRIP: NEGATIVE
HISTORY CHECK: NORMAL
INR PPP: 1
KETONES UR QL STRIP.AUTO: 15 MG/DL
LEUKOCYTE ESTERASE UR QL STRIP.AUTO: NEGATIVE
MAGNESIUM SERPL-MCNC: 1.9 MG/DL (ref 1.8–2.4)
MCH RBC QN AUTO: 28.6 PG (ref 26.1–32.9)
MCHC RBC AUTO-ENTMCNC: 33.3 G/DL (ref 31.4–35)
MCV RBC AUTO: 86 FL (ref 82–102)
NITRITE UR QL STRIP.AUTO: NEGATIVE
NRBC # BLD: 0 K/UL (ref 0–0.2)
PH UR STRIP: 5 (ref 5–9)
PLATELET # BLD AUTO: 235 K/UL (ref 150–450)
PMV BLD AUTO: 10 FL (ref 9.4–12.3)
POTASSIUM SERPL-SCNC: 4 MMOL/L (ref 3.5–5.1)
PROT SERPL-MCNC: 7.8 G/DL (ref 6.3–8.2)
PROT UR STRIP-MCNC: 100 MG/DL
PROTHROMBIN TIME: 13.4 SEC (ref 12.6–14.3)
RBC # BLD AUTO: 4.86 M/UL (ref 4.23–5.6)
RBC #/AREA URNS HPF: ABNORMAL /HPF
SERVICE CMNT-IMP: NORMAL
SODIUM SERPL-SCNC: 138 MMOL/L (ref 133–143)
SP GR UR REFRACTOMETRY: >1.035 (ref 1–1.02)
UROBILINOGEN UR QL STRIP.AUTO: 1 EU/DL (ref 0.2–1)
WBC # BLD AUTO: 7.6 K/UL (ref 4.3–11.1)
WBC URNS QL MICRO: ABNORMAL /HPF

## 2023-02-13 PROCEDURE — 81001 URINALYSIS AUTO W/SCOPE: CPT

## 2023-02-13 PROCEDURE — 86920 COMPATIBILITY TEST SPIN: CPT

## 2023-02-13 PROCEDURE — 85730 THROMBOPLASTIN TIME PARTIAL: CPT

## 2023-02-13 PROCEDURE — 93880 EXTRACRANIAL BILAT STUDY: CPT

## 2023-02-13 PROCEDURE — 94010 BREATHING CAPACITY TEST: CPT

## 2023-02-13 PROCEDURE — 86900 BLOOD TYPING SEROLOGIC ABO: CPT

## 2023-02-13 PROCEDURE — 71046 X-RAY EXAM CHEST 2 VIEWS: CPT

## 2023-02-13 PROCEDURE — 83735 ASSAY OF MAGNESIUM: CPT

## 2023-02-13 PROCEDURE — 83036 HEMOGLOBIN GLYCOSYLATED A1C: CPT

## 2023-02-13 PROCEDURE — 87641 MR-STAPH DNA AMP PROBE: CPT

## 2023-02-13 PROCEDURE — 85027 COMPLETE CBC AUTOMATED: CPT

## 2023-02-13 PROCEDURE — 85610 PROTHROMBIN TIME: CPT

## 2023-02-13 PROCEDURE — 80053 COMPREHEN METABOLIC PANEL: CPT

## 2023-02-13 RX ORDER — AMIODARONE HYDROCHLORIDE 200 MG/1
600 TABLET ORAL ONCE
Qty: 3 TABLET | Refills: 0 | Status: ON HOLD | OUTPATIENT
Start: 2023-02-13 | End: 2023-02-15

## 2023-02-13 RX ORDER — ASCORBIC ACID 1000 MG
1 TABLET ORAL DAILY
Status: ON HOLD | COMMUNITY

## 2023-02-13 RX ORDER — MULTIVIT-MIN/IRON FUM/FOLIC AC 7.5 MG-4
1 TABLET ORAL DAILY
Status: ON HOLD | COMMUNITY

## 2023-02-13 NOTE — PROGRESS NOTES
Reviewed todays  P.A. T. test results-- carotid right 70-99%-- and left 50-69%, glucose 281, HA1C 8.0-- called office-- spoke to autumn-- she states will get lorenza to look at these labs

## 2023-02-13 NOTE — PROGRESS NOTES
Patient verified name and . Patient provided medical/health information and PTA medications to the best of their ability. TYPE  CASE: 3  Order for consent WAS found in EHR and matches case posting. Labs per surgeon: PFTS, CAROTID, CXR, TYPE AND SCREEN, RBCS, PLT, FFP, URINE, PT/PTT, MAG, HA1C, CBC, CMP,MRSA---ALL COLLECTED AND SENT TO LAB--PT SENT TO XRAY WITH COPY OF ORDERS  Labs per anesthesia protocol: SQBS DOS  EKG:  TODAY, CATH AND ECHO IN CC    MRSA/MSSA swab collected  Patient provided with and instructed on educational handouts including Heart Guide to Surgery, blood transfusions, pain management, central line infection prevention, being on a ventilator and hand hygiene for the family and community, and [facility] brochure. Instructed that family must be present in building at all times. Incentive spirometry completed with return demonstration. FEV1 completed as ordered. Patient viewed pre-operative DVD. Instructed on chest-xray procedure and carotid ultrasound. Instructed on type and cross match procedure and not to remove green blood bank bracelet. Instructed on medications- Amiodarone NOTED IN Epic RX HAD BEEN CALLED INTO [facility]-- WHEN QUESTIONED PT-- HE STATES HE DIDN'T KNOW ANYTHING ABOUT IT-- AND THAT [facility] DID NOT TAKE  ( HIS PRIMARY INSURANCE)-- SPOKE TO LORENE-- STATES SHE WILL CALL IT INTO [facility]-- PT MADE AWARE OF RX AND WHEN TO TAKE IT-- ( VERBAL AND WRITTEN)    Surgical skin cleanser provided and instructions given per hospital policy. Original medication prescription bottles WAS NOT visualized during patient appointment. Patient teach back successful and patient demonstrates knowledge of instruction.

## 2023-02-13 NOTE — PROGRESS NOTES
PLEASE CONTINUE TAKING ALL PRESCRIPTION MEDICATIONS UP TO THE DAY OF SURGERY UNLESS OTHERWISE DIRECTED BELOW. DISCONTINUE all vitamins and supplements 7 days prior to surgery. DISCONTINUE Non-Steriodal Anti-Inflammatory (NSAIDS) such as Advil and Aleve 5 days prior to surgery. Home Medications to take  the day of surgery    Hydrocodone, aspirin 81 mg, imdur, metoprolol, finasteride   TAKE LANTUS 12 UNITS EVENING PRIOR TO SURGERY   AMIODARONE 600 MG AS ONE TIME DOSE-- AFTER 4 PM EVENING PRIOR TO SURG--- WILL BE CALLED INTO [facility]     Home Medications   to Hold   ALL VITAMINS AND SUPPLEMENTS-- also hold losartan   x 48 hours        Comments                Please do not bring home medications with you on the day of surgery unless otherwise directed by your nurse. If you are instructed to bring home medications, please give them to your nurse as they will be administered by the nursing staff. If you have any questions, please call [facility] Emma Smith [phone] or [address] [place] [address] [phone]. A copy of this note was provided to the patient for reference.

## 2023-02-14 ENCOUNTER — ANESTHESIA EVENT (OUTPATIENT)
Dept: SURGERY | Age: 73
End: 2023-02-14
Payer: OTHER GOVERNMENT

## 2023-02-15 ENCOUNTER — APPOINTMENT (OUTPATIENT)
Dept: GENERAL RADIOLOGY | Age: 73
DRG: 236 | End: 2023-02-15
Attending: THORACIC SURGERY (CARDIOTHORACIC VASCULAR SURGERY)
Payer: OTHER GOVERNMENT

## 2023-02-15 ENCOUNTER — HOSPITAL ENCOUNTER (INPATIENT)
Age: 73
LOS: 5 days | Discharge: HOME HEALTH | DRG: 236 | End: 2023-02-20
Attending: THORACIC SURGERY (CARDIOTHORACIC VASCULAR SURGERY) | Admitting: THORACIC SURGERY (CARDIOTHORACIC VASCULAR SURGERY)
Payer: OTHER GOVERNMENT

## 2023-02-15 ENCOUNTER — ANESTHESIA (OUTPATIENT)
Dept: SURGERY | Age: 73
End: 2023-02-15
Payer: OTHER GOVERNMENT

## 2023-02-15 DIAGNOSIS — Z95.1 S/P CABG X 3: Primary | ICD-10-CM

## 2023-02-15 PROBLEM — Z03.89 CORONARY ARTERY DISEASE (CAD) EXCLUDED: Status: RESOLVED | Noted: 2023-02-15 | Resolved: 2023-02-15

## 2023-02-15 PROBLEM — Z99.11 ENCOUNTER FOR WEANING FROM VENTILATOR (HCC): Status: ACTIVE | Noted: 2023-02-15

## 2023-02-15 PROBLEM — I65.29 CAROTID ARTERY STENOSIS, ASYMPTOMATIC: Status: ACTIVE | Noted: 2023-02-15

## 2023-02-15 PROBLEM — R09.02 HYPOXEMIA: Status: ACTIVE | Noted: 2023-02-15

## 2023-02-15 PROBLEM — J98.11 ATELECTASIS: Status: ACTIVE | Noted: 2023-02-15

## 2023-02-15 PROBLEM — Z03.89 CORONARY ARTERY DISEASE (CAD) EXCLUDED: Status: ACTIVE | Noted: 2023-02-15

## 2023-02-15 LAB
ACT AVERAGE - AAVG: 124 SEC
ACT AVERAGE - AAVG: 519 SEC
ACT AVERAGE - AAVG: 548 SEC
ACT AVERAGE - AAVG: 548 SEC
ACT AVERAGE - AAVG: 598 SEC
ANION GAP SERPL CALC-SCNC: 6 MMOL/L (ref 2–11)
APTT PPP: 30.6 SEC (ref 24.5–34.2)
ARTERIAL PATENCY WRIST A: ABNORMAL
BASE DEFICIT BLD-SCNC: 0.1 MMOL/L
BASE DEFICIT BLD-SCNC: 2.1 MMOL/L
BASE DEFICIT BLD-SCNC: 2.5 MMOL/L
BASE DEFICIT BLD-SCNC: 2.6 MMOL/L
BASE DEFICIT BLD-SCNC: 2.8 MMOL/L
BASE DEFICIT BLD-SCNC: 2.9 MMOL/L
BASE DEFICIT BLD-SCNC: 4.9 MMOL/L
BASELINE ACT: 156 SEC
BDY SITE: ABNORMAL
BUN SERPL-MCNC: 19 MG/DL (ref 8–23)
CA-I BLD-MCNC: 1.14 MMOL/L (ref 1.12–1.32)
CA-I BLD-MCNC: 1.17 MMOL/L (ref 1.12–1.32)
CA-I BLD-MCNC: 1.18 MMOL/L (ref 1.12–1.32)
CA-I BLD-MCNC: 1.19 MMOL/L (ref 1.12–1.32)
CA-I BLD-MCNC: 1.25 MMOL/L (ref 1.12–1.32)
CA-I BLD-MCNC: 1.35 MMOL/L (ref 1.12–1.32)
CA-I BLD-MCNC: 1.4 MMOL/L (ref 1.12–1.32)
CALCIUM SERPL-MCNC: 8 MG/DL (ref 8.3–10.4)
CHLORIDE SERPL-SCNC: 114 MMOL/L (ref 101–110)
CO2 BLD-SCNC: 20 MMOL/L (ref 13–23)
CO2 BLD-SCNC: 21 MMOL/L (ref 13–23)
CO2 BLD-SCNC: 21 MMOL/L (ref 13–23)
CO2 BLD-SCNC: 22 MMOL/L (ref 13–23)
CO2 BLD-SCNC: 22 MMOL/L (ref 13–23)
CO2 BLD-SCNC: 23 MMOL/L (ref 13–23)
CO2 BLD-SCNC: 24 MMOL/L (ref 13–23)
CO2 SERPL-SCNC: 23 MMOL/L (ref 21–32)
CREAT SERPL-MCNC: 1.2 MG/DL (ref 0.8–1.5)
EKG ATRIAL RATE: 79 BPM
EKG DIAGNOSIS: NORMAL
EKG P AXIS: 69 DEGREES
EKG P-R INTERVAL: 180 MS
EKG Q-T INTERVAL: 414 MS
EKG QRS DURATION: 98 MS
EKG QTC CALCULATION (BAZETT): 474 MS
EKG R AXIS: 32 DEGREES
EKG T AXIS: 2 DEGREES
EKG VENTRICULAR RATE: 79 BPM
ERYTHROCYTE [DISTWIDTH] IN BLOOD BY AUTOMATED COUNT: 13.5 % (ref 11.9–14.6)
ERYTHROCYTE [DISTWIDTH] IN BLOOD BY AUTOMATED COUNT: 13.6 % (ref 11.9–14.6)
FIBRINOGEN PPP-MCNC: 194 MG/DL (ref 190–501)
FIO2 ON VENT: 60 %
GAS FLOW.O2 O2 DELIVERY SYS: ABNORMAL
GLUCOSE BLD STRIP.AUTO-MCNC: 103 MG/DL (ref 65–100)
GLUCOSE BLD STRIP.AUTO-MCNC: 110 MG/DL (ref 65–100)
GLUCOSE BLD STRIP.AUTO-MCNC: 112 MG/DL (ref 65–100)
GLUCOSE BLD STRIP.AUTO-MCNC: 113 MG/DL (ref 65–100)
GLUCOSE BLD STRIP.AUTO-MCNC: 115 MG/DL (ref 65–100)
GLUCOSE BLD STRIP.AUTO-MCNC: 141 MG/DL (ref 65–100)
GLUCOSE BLD STRIP.AUTO-MCNC: 164 MG/DL (ref 65–100)
GLUCOSE BLD STRIP.AUTO-MCNC: 176 MG/DL (ref 65–100)
GLUCOSE BLD STRIP.AUTO-MCNC: 180 MG/DL (ref 65–100)
GLUCOSE BLD STRIP.AUTO-MCNC: 195 MG/DL (ref 65–100)
GLUCOSE BLD STRIP.AUTO-MCNC: 201 MG/DL (ref 65–100)
GLUCOSE BLD STRIP.AUTO-MCNC: 203 MG/DL (ref 65–100)
GLUCOSE BLD STRIP.AUTO-MCNC: 204 MG/DL (ref 65–100)
GLUCOSE BLD STRIP.AUTO-MCNC: 208 MG/DL (ref 65–100)
GLUCOSE BLD STRIP.AUTO-MCNC: 214 MG/DL (ref 65–100)
GLUCOSE BLD STRIP.AUTO-MCNC: 85 MG/DL (ref 65–100)
GLUCOSE SERPL-MCNC: 117 MG/DL (ref 65–100)
HCO3 BLD-SCNC: 20.4 MMOL/L (ref 22–26)
HCO3 BLD-SCNC: 22 MMOL/L (ref 22–26)
HCO3 BLD-SCNC: 22.2 MMOL/L (ref 22–26)
HCO3 BLD-SCNC: 22.4 MMOL/L (ref 22–26)
HCO3 BLD-SCNC: 22.9 MMOL/L (ref 22–26)
HCO3 BLD-SCNC: 23.2 MMOL/L (ref 22–26)
HCO3 BLD-SCNC: 24.8 MMOL/L (ref 22–26)
HCT VFR BLD AUTO: 30 % (ref 41.1–50.3)
HCT VFR BLD AUTO: 30.2 % (ref 41.1–50.3)
HCT VFR BLD AUTO: 32.5 % (ref 41.1–50.3)
HEPARIN BOLUS-PATIENT: NORMAL UNITS
HEPARIN BOLUS-PUMP: 0 UNITS
HEPARIN BOLUS-TOTAL: NORMAL UNITS
HEPARIN REQUIRED-PATIENT: 0
HEPARIN REQUIRED-PATIENT: 0
HEPARIN REQUIRED-TOTAL: 0 UNITS
HEPARIN REQUIRED-TOTAL: 0 UNITS
HEPARIN TEST CONCENTRATE: 2.5 MG/KG
HEPARIN TEST CONCENTRATE: 3 MG/KG
HGB BLD-MCNC: 10 G/DL (ref 13.6–17.2)
HGB BLD-MCNC: 10 G/DL (ref 13.6–17.2)
HGB BLD-MCNC: 10.8 G/DL (ref 13.6–17.2)
INR PPP: 1.3
MAGNESIUM SERPL-MCNC: 2.1 MG/DL (ref 1.8–2.4)
MAGNESIUM SERPL-MCNC: 2.3 MG/DL (ref 1.8–2.4)
MAGNESIUM SERPL-MCNC: 2.8 MG/DL (ref 1.8–2.4)
MCH RBC QN AUTO: 29.2 PG (ref 26.1–32.9)
MCH RBC QN AUTO: 29.6 PG (ref 26.1–32.9)
MCHC RBC AUTO-ENTMCNC: 33.1 G/DL (ref 31.4–35)
MCHC RBC AUTO-ENTMCNC: 33.2 G/DL (ref 31.4–35)
MCV RBC AUTO: 88 FL (ref 82–102)
MCV RBC AUTO: 89 FL (ref 82–102)
NRBC # BLD: 0 K/UL (ref 0–0.2)
NRBC # BLD: 0 K/UL (ref 0–0.2)
PCO2 BLD: 35.9 MMHG (ref 35–45)
PCO2 BLD: 37.3 MMHG (ref 35–45)
PCO2 BLD: 37.6 MMHG (ref 35–45)
PCO2 BLD: 38.9 MMHG (ref 35–45)
PCO2 BLD: 40.5 MMHG (ref 35–45)
PCO2 BLD: 41.8 MMHG (ref 35–45)
PCO2 BLD: 43.1 MMHG (ref 35–45)
PEEP RESPIRATORY: 8
PH BLD: 7.34 (ref 7.35–7.45)
PH BLD: 7.34 (ref 7.35–7.45)
PH BLD: 7.35 (ref 7.35–7.45)
PH BLD: 7.37 (ref 7.35–7.45)
PH BLD: 7.38 (ref 7.35–7.45)
PH BLD: 7.4 (ref 7.35–7.45)
PH BLD: 7.4 (ref 7.35–7.45)
PLATELET # BLD AUTO: 109 K/UL (ref 150–450)
PLATELET # BLD AUTO: 126 K/UL (ref 150–450)
PMV BLD AUTO: 10.3 FL (ref 9.4–12.3)
PMV BLD AUTO: 9.9 FL (ref 9.4–12.3)
PO2 BLD: 120 MMHG (ref 75–100)
PO2 BLD: 148 MMHG (ref 75–100)
PO2 BLD: 248 MMHG (ref 75–100)
PO2 BLD: 249 MMHG (ref 75–100)
PO2 BLD: 284 MMHG (ref 75–100)
PO2 BLD: 336 MMHG (ref 75–100)
PO2 BLD: 343 MMHG (ref 75–100)
POTASSIUM BLD-SCNC: 3.7 MMOL/L (ref 3.5–5.1)
POTASSIUM BLD-SCNC: 4.1 MMOL/L (ref 3.5–5.1)
POTASSIUM BLD-SCNC: 4.5 MMOL/L (ref 3.5–5.1)
POTASSIUM BLD-SCNC: 4.5 MMOL/L (ref 3.5–5.1)
POTASSIUM BLD-SCNC: 4.6 MMOL/L (ref 3.5–5.1)
POTASSIUM BLD-SCNC: 4.6 MMOL/L (ref 3.5–5.1)
POTASSIUM BLD-SCNC: 5.2 MMOL/L (ref 3.5–5.1)
POTASSIUM SERPL-SCNC: 3.9 MMOL/L (ref 3.5–5.1)
POTASSIUM SERPL-SCNC: 4 MMOL/L (ref 3.5–5.1)
POTASSIUM SERPL-SCNC: 4.3 MMOL/L (ref 3.5–5.1)
PRESSURE SUPPORT SETTING VENT: 12
PROJECTED HEPARIN CONCENTATION: 2.5 MG/KG
PROTAMINE DOSE-PUMP: 37 MG
PROTAMINE DOSE-PUMP: 45 MG
PROTAMINE DOSE-TOTAL: 248 MG
PROTAMINE DOSE-TOTAL: 299 MG
PROTHROMBIN TIME: 17.1 SEC (ref 12.6–14.3)
RBC # BLD AUTO: 3.43 M/UL (ref 4.23–5.6)
RBC # BLD AUTO: 3.65 M/UL (ref 4.23–5.6)
RESPIRATORY RATE, POC: 18 (ref 5–40)
RESPIRATORY RATE: 18
SAO2 % BLD: 100 %
SAO2 % BLD: 98 %
SAO2 % BLD: 99 %
SERVICE CMNT-IMP: ABNORMAL
SERVICE CMNT-IMP: NORMAL
SLOPE: 97
SODIUM BLD-SCNC: 138 MMOL/L (ref 136–145)
SODIUM BLD-SCNC: 140 MMOL/L (ref 136–145)
SODIUM BLD-SCNC: 142 MMOL/L (ref 136–145)
SODIUM BLD-SCNC: 142 MMOL/L (ref 136–145)
SODIUM BLD-SCNC: 143 MMOL/L (ref 136–145)
SODIUM SERPL-SCNC: 143 MMOL/L (ref 133–143)
SPECIMEN SITE: ABNORMAL
VENTILATION MODE VENT: ABNORMAL
VT SETTING VENT: 500
WBC # BLD AUTO: 7.7 K/UL (ref 4.3–11.1)
WBC # BLD AUTO: 7.8 K/UL (ref 4.3–11.1)

## 2023-02-15 PROCEDURE — 84132 ASSAY OF SERUM POTASSIUM: CPT

## 2023-02-15 PROCEDURE — A4217 STERILE WATER/SALINE, 500 ML: HCPCS | Performed by: THORACIC SURGERY (CARDIOTHORACIC VASCULAR SURGERY)

## 2023-02-15 PROCEDURE — 82330 ASSAY OF CALCIUM: CPT

## 2023-02-15 PROCEDURE — C1769 GUIDE WIRE: HCPCS | Performed by: THORACIC SURGERY (CARDIOTHORACIC VASCULAR SURGERY)

## 2023-02-15 PROCEDURE — 021109W BYPASS CORONARY ARTERY, TWO ARTERIES FROM AORTA WITH AUTOLOGOUS VENOUS TISSUE, OPEN APPROACH: ICD-10-PCS | Performed by: THORACIC SURGERY (CARDIOTHORACIC VASCULAR SURGERY)

## 2023-02-15 PROCEDURE — 2100000000 HC CCU R&B

## 2023-02-15 PROCEDURE — C1751 CATH, INF, PER/CENT/MIDLINE: HCPCS | Performed by: THORACIC SURGERY (CARDIOTHORACIC VASCULAR SURGERY)

## 2023-02-15 PROCEDURE — 71045 X-RAY EXAM CHEST 1 VIEW: CPT

## 2023-02-15 PROCEDURE — 2580000003 HC RX 258: Performed by: STUDENT IN AN ORGANIZED HEALTH CARE EDUCATION/TRAINING PROGRAM

## 2023-02-15 PROCEDURE — 84295 ASSAY OF SERUM SODIUM: CPT

## 2023-02-15 PROCEDURE — 85730 THROMBOPLASTIN TIME PARTIAL: CPT

## 2023-02-15 PROCEDURE — 83735 ASSAY OF MAGNESIUM: CPT

## 2023-02-15 PROCEDURE — 2709999900 HC NON-CHARGEABLE SUPPLY: Performed by: THORACIC SURGERY (CARDIOTHORACIC VASCULAR SURGERY)

## 2023-02-15 PROCEDURE — 93005 ELECTROCARDIOGRAM TRACING: CPT | Performed by: PHYSICIAN ASSISTANT

## 2023-02-15 PROCEDURE — 2580000003 HC RX 258: Performed by: THORACIC SURGERY (CARDIOTHORACIC VASCULAR SURGERY)

## 2023-02-15 PROCEDURE — 82947 ASSAY GLUCOSE BLOOD QUANT: CPT

## 2023-02-15 PROCEDURE — 2500000003 HC RX 250 WO HCPCS

## 2023-02-15 PROCEDURE — 6370000000 HC RX 637 (ALT 250 FOR IP): Performed by: NURSE ANESTHETIST, CERTIFIED REGISTERED

## 2023-02-15 PROCEDURE — P9047 ALBUMIN (HUMAN), 25%, 50ML: HCPCS

## 2023-02-15 PROCEDURE — 6360000002 HC RX W HCPCS

## 2023-02-15 PROCEDURE — 85520 HEPARIN ASSAY: CPT

## 2023-02-15 PROCEDURE — C1713 ANCHOR/SCREW BN/BN,TIS/BN: HCPCS | Performed by: THORACIC SURGERY (CARDIOTHORACIC VASCULAR SURGERY)

## 2023-02-15 PROCEDURE — 85347 COAGULATION TIME ACTIVATED: CPT

## 2023-02-15 PROCEDURE — 85014 HEMATOCRIT: CPT

## 2023-02-15 PROCEDURE — 02100Z9 BYPASS CORONARY ARTERY, ONE ARTERY FROM LEFT INTERNAL MAMMARY, OPEN APPROACH: ICD-10-PCS | Performed by: THORACIC SURGERY (CARDIOTHORACIC VASCULAR SURGERY)

## 2023-02-15 PROCEDURE — 5A1221Z PERFORMANCE OF CARDIAC OUTPUT, CONTINUOUS: ICD-10-PCS | Performed by: THORACIC SURGERY (CARDIOTHORACIC VASCULAR SURGERY)

## 2023-02-15 PROCEDURE — 3700000000 HC ANESTHESIA ATTENDED CARE: Performed by: THORACIC SURGERY (CARDIOTHORACIC VASCULAR SURGERY)

## 2023-02-15 PROCEDURE — 6360000002 HC RX W HCPCS: Performed by: NURSE PRACTITIONER

## 2023-02-15 PROCEDURE — 2580000003 HC RX 258

## 2023-02-15 PROCEDURE — 6360000002 HC RX W HCPCS: Performed by: NURSE ANESTHETIST, CERTIFIED REGISTERED

## 2023-02-15 PROCEDURE — P9041 ALBUMIN (HUMAN),5%, 50ML: HCPCS | Performed by: THORACIC SURGERY (CARDIOTHORACIC VASCULAR SURGERY)

## 2023-02-15 PROCEDURE — 06BQ4ZZ EXCISION OF LEFT SAPHENOUS VEIN, PERCUTANEOUS ENDOSCOPIC APPROACH: ICD-10-PCS | Performed by: THORACIC SURGERY (CARDIOTHORACIC VASCULAR SURGERY)

## 2023-02-15 PROCEDURE — 85530 HEPARIN-PROTAMINE TOLERANCE: CPT

## 2023-02-15 PROCEDURE — 82962 GLUCOSE BLOOD TEST: CPT

## 2023-02-15 PROCEDURE — 6370000000 HC RX 637 (ALT 250 FOR IP): Performed by: PHYSICIAN ASSISTANT

## 2023-02-15 PROCEDURE — 85027 COMPLETE CBC AUTOMATED: CPT

## 2023-02-15 PROCEDURE — 6370000000 HC RX 637 (ALT 250 FOR IP): Performed by: THORACIC SURGERY (CARDIOTHORACIC VASCULAR SURGERY)

## 2023-02-15 PROCEDURE — 2500000003 HC RX 250 WO HCPCS: Performed by: NURSE ANESTHETIST, CERTIFIED REGISTERED

## 2023-02-15 PROCEDURE — 2500000003 HC RX 250 WO HCPCS: Performed by: PHYSICIAN ASSISTANT

## 2023-02-15 PROCEDURE — 80048 BASIC METABOLIC PNL TOTAL CA: CPT

## 2023-02-15 PROCEDURE — 82803 BLOOD GASES ANY COMBINATION: CPT

## 2023-02-15 PROCEDURE — 99223 1ST HOSP IP/OBS HIGH 75: CPT | Performed by: INTERNAL MEDICINE

## 2023-02-15 PROCEDURE — 85384 FIBRINOGEN ACTIVITY: CPT

## 2023-02-15 PROCEDURE — 2580000003 HC RX 258: Performed by: PHYSICIAN ASSISTANT

## 2023-02-15 PROCEDURE — C1729 CATH, DRAINAGE: HCPCS | Performed by: THORACIC SURGERY (CARDIOTHORACIC VASCULAR SURGERY)

## 2023-02-15 PROCEDURE — 3600000018 HC SURGERY OHS ADDTL 15MIN: Performed by: THORACIC SURGERY (CARDIOTHORACIC VASCULAR SURGERY)

## 2023-02-15 PROCEDURE — 94002 VENT MGMT INPAT INIT DAY: CPT

## 2023-02-15 PROCEDURE — 3600000008 HC SURGERY OHS BASE: Performed by: THORACIC SURGERY (CARDIOTHORACIC VASCULAR SURGERY)

## 2023-02-15 PROCEDURE — 85610 PROTHROMBIN TIME: CPT

## 2023-02-15 PROCEDURE — 2700000000 HC OXYGEN THERAPY PER DAY

## 2023-02-15 PROCEDURE — 2720000010 HC SURG SUPPLY STERILE: Performed by: THORACIC SURGERY (CARDIOTHORACIC VASCULAR SURGERY)

## 2023-02-15 PROCEDURE — 6360000002 HC RX W HCPCS: Performed by: THORACIC SURGERY (CARDIOTHORACIC VASCULAR SURGERY)

## 2023-02-15 PROCEDURE — 3700000001 HC ADD 15 MINUTES (ANESTHESIA): Performed by: THORACIC SURGERY (CARDIOTHORACIC VASCULAR SURGERY)

## 2023-02-15 PROCEDURE — 2580000003 HC RX 258: Performed by: NURSE ANESTHETIST, CERTIFIED REGISTERED

## 2023-02-15 PROCEDURE — 6360000002 HC RX W HCPCS: Performed by: PHYSICIAN ASSISTANT

## 2023-02-15 PROCEDURE — 6370000000 HC RX 637 (ALT 250 FOR IP): Performed by: NURSE PRACTITIONER

## 2023-02-15 RX ORDER — NOREPINEPHRINE BIT/0.9 % NACL 16MG/250ML
.01-.1 INFUSION BOTTLE (ML) INTRAVENOUS CONTINUOUS PRN
Status: DISCONTINUED | OUTPATIENT
Start: 2023-02-15 | End: 2023-02-16

## 2023-02-15 RX ORDER — NICARDIPINE HYDROCHLORIDE 0.1 MG/ML
INJECTION INTRAVENOUS PRN
Status: DISCONTINUED | OUTPATIENT
Start: 2023-02-15 | End: 2023-02-15 | Stop reason: SDUPTHER

## 2023-02-15 RX ORDER — NITROGLYCERIN 20 MG/100ML
INJECTION INTRAVENOUS CONTINUOUS PRN
Status: DISCONTINUED | OUTPATIENT
Start: 2023-02-15 | End: 2023-02-15 | Stop reason: SDUPTHER

## 2023-02-15 RX ORDER — AMINOCAPROIC ACID 250 MG/ML
INJECTION, SOLUTION INTRAVENOUS PRN
Status: DISCONTINUED | OUTPATIENT
Start: 2023-02-15 | End: 2023-02-15 | Stop reason: SDUPTHER

## 2023-02-15 RX ORDER — HEPARIN SODIUM 1000 [USP'U]/ML
INJECTION, SOLUTION INTRAVENOUS; SUBCUTANEOUS PRN
Status: DISCONTINUED | OUTPATIENT
Start: 2023-02-15 | End: 2023-02-15 | Stop reason: SDUPTHER

## 2023-02-15 RX ORDER — KETOROLAC TROMETHAMINE 30 MG/ML
30 INJECTION, SOLUTION INTRAMUSCULAR; INTRAVENOUS ONCE
Status: COMPLETED | OUTPATIENT
Start: 2023-02-15 | End: 2023-02-15

## 2023-02-15 RX ORDER — MIDAZOLAM HYDROCHLORIDE 1 MG/ML
INJECTION INTRAMUSCULAR; INTRAVENOUS PRN
Status: DISCONTINUED | OUTPATIENT
Start: 2023-02-15 | End: 2023-02-15 | Stop reason: SDUPTHER

## 2023-02-15 RX ORDER — NITROGLYCERIN 20 MG/100ML
0-100 INJECTION INTRAVENOUS CONTINUOUS PRN
Status: DISCONTINUED | OUTPATIENT
Start: 2023-02-15 | End: 2023-02-16

## 2023-02-15 RX ORDER — MIDAZOLAM HYDROCHLORIDE 2 MG/2ML
2 INJECTION, SOLUTION INTRAMUSCULAR; INTRAVENOUS
Status: DISCONTINUED | OUTPATIENT
Start: 2023-02-15 | End: 2023-02-15 | Stop reason: HOSPADM

## 2023-02-15 RX ORDER — LIDOCAINE HYDROCHLORIDE 10 MG/ML
1 INJECTION, SOLUTION INFILTRATION; PERINEURAL
Status: DISCONTINUED | OUTPATIENT
Start: 2023-02-15 | End: 2023-02-15 | Stop reason: HOSPADM

## 2023-02-15 RX ORDER — ROSUVASTATIN CALCIUM 20 MG/1
40 TABLET, COATED ORAL NIGHTLY
Status: DISCONTINUED | OUTPATIENT
Start: 2023-02-15 | End: 2023-02-20 | Stop reason: HOSPADM

## 2023-02-15 RX ORDER — SODIUM CHLORIDE 0.9 % (FLUSH) 0.9 %
5-40 SYRINGE (ML) INJECTION EVERY 12 HOURS SCHEDULED
Status: DISCONTINUED | OUTPATIENT
Start: 2023-02-15 | End: 2023-02-15 | Stop reason: HOSPADM

## 2023-02-15 RX ORDER — LIDOCAINE HYDROCHLORIDE 20 MG/ML
INJECTION, SOLUTION EPIDURAL; INFILTRATION; INTRACAUDAL; PERINEURAL PRN
Status: DISCONTINUED | OUTPATIENT
Start: 2023-02-15 | End: 2023-02-15 | Stop reason: SDUPTHER

## 2023-02-15 RX ORDER — SODIUM CHLORIDE, SODIUM LACTATE, POTASSIUM CHLORIDE, CALCIUM CHLORIDE 600; 310; 30; 20 MG/100ML; MG/100ML; MG/100ML; MG/100ML
INJECTION, SOLUTION INTRAVENOUS CONTINUOUS
Status: DISCONTINUED | OUTPATIENT
Start: 2023-02-15 | End: 2023-02-15 | Stop reason: HOSPADM

## 2023-02-15 RX ORDER — CARVEDILOL 3.12 MG/1
3.12 TABLET ORAL 2 TIMES DAILY
Status: DISCONTINUED | OUTPATIENT
Start: 2023-02-15 | End: 2023-02-20 | Stop reason: HOSPADM

## 2023-02-15 RX ORDER — INSULIN LISPRO 100 [IU]/ML
0-12 INJECTION, SOLUTION INTRAVENOUS; SUBCUTANEOUS
Status: DISCONTINUED | OUTPATIENT
Start: 2023-02-16 | End: 2023-02-16

## 2023-02-15 RX ORDER — ACETAMINOPHEN 325 MG/1
650 TABLET ORAL EVERY 4 HOURS PRN
Status: DISCONTINUED | OUTPATIENT
Start: 2023-02-15 | End: 2023-02-16

## 2023-02-15 RX ORDER — FENTANYL CITRATE 50 UG/ML
100 INJECTION, SOLUTION INTRAMUSCULAR; INTRAVENOUS
Status: DISCONTINUED | OUTPATIENT
Start: 2023-02-15 | End: 2023-02-15 | Stop reason: HOSPADM

## 2023-02-15 RX ORDER — MORPHINE SULFATE 4 MG/ML
4 INJECTION, SOLUTION INTRAMUSCULAR; INTRAVENOUS
Status: DISCONTINUED | OUTPATIENT
Start: 2023-02-15 | End: 2023-02-16

## 2023-02-15 RX ORDER — SODIUM CHLORIDE 9 MG/ML
INJECTION, SOLUTION INTRAVENOUS PRN
Status: DISCONTINUED | OUTPATIENT
Start: 2023-02-15 | End: 2023-02-16

## 2023-02-15 RX ORDER — SODIUM CHLORIDE 9 MG/ML
INJECTION, SOLUTION INTRAVENOUS PRN
Status: DISCONTINUED | OUTPATIENT
Start: 2023-02-15 | End: 2023-02-15 | Stop reason: HOSPADM

## 2023-02-15 RX ORDER — SODIUM CHLORIDE 0.9 % (FLUSH) 0.9 %
5-40 SYRINGE (ML) INJECTION EVERY 12 HOURS SCHEDULED
Status: DISCONTINUED | OUTPATIENT
Start: 2023-02-15 | End: 2023-02-16

## 2023-02-15 RX ORDER — DEXMEDETOMIDINE HYDROCHLORIDE 4 UG/ML
INJECTION, SOLUTION INTRAVENOUS CONTINUOUS PRN
Status: DISCONTINUED | OUTPATIENT
Start: 2023-02-15 | End: 2023-02-15 | Stop reason: SDUPTHER

## 2023-02-15 RX ORDER — OXYCODONE HYDROCHLORIDE AND ACETAMINOPHEN 5; 325 MG/1; MG/1
1 TABLET ORAL EVERY 4 HOURS PRN
Status: DISCONTINUED | OUTPATIENT
Start: 2023-02-15 | End: 2023-02-16

## 2023-02-15 RX ORDER — POTASSIUM CHLORIDE 29.8 MG/ML
20 INJECTION INTRAVENOUS PRN
Status: DISCONTINUED | OUTPATIENT
Start: 2023-02-15 | End: 2023-02-16

## 2023-02-15 RX ORDER — EPHEDRINE SULFATE/0.9% NACL/PF 50 MG/5 ML
SYRINGE (ML) INTRAVENOUS PRN
Status: DISCONTINUED | OUTPATIENT
Start: 2023-02-15 | End: 2023-02-15 | Stop reason: SDUPTHER

## 2023-02-15 RX ORDER — FENTANYL CITRATE 50 UG/ML
25 INJECTION, SOLUTION INTRAMUSCULAR; INTRAVENOUS
Status: DISCONTINUED | OUTPATIENT
Start: 2023-02-15 | End: 2023-02-15 | Stop reason: HOSPADM

## 2023-02-15 RX ORDER — SODIUM CHLORIDE 9 MG/ML
INJECTION, SOLUTION INTRAVENOUS CONTINUOUS PRN
Status: DISCONTINUED | OUTPATIENT
Start: 2023-02-15 | End: 2023-02-15 | Stop reason: SDUPTHER

## 2023-02-15 RX ORDER — KETOROLAC TROMETHAMINE 15 MG/ML
15 INJECTION, SOLUTION INTRAMUSCULAR; INTRAVENOUS EVERY 6 HOURS PRN
Status: ACTIVE | OUTPATIENT
Start: 2023-02-15 | End: 2023-02-15

## 2023-02-15 RX ORDER — MAGNESIUM SULFATE 1 G/100ML
1000 INJECTION INTRAVENOUS PRN
Status: DISCONTINUED | OUTPATIENT
Start: 2023-02-15 | End: 2023-02-16

## 2023-02-15 RX ORDER — ASPIRIN 81 MG/1
81 TABLET ORAL DAILY
Status: DISCONTINUED | OUTPATIENT
Start: 2023-02-16 | End: 2023-02-20 | Stop reason: HOSPADM

## 2023-02-15 RX ORDER — ROCURONIUM BROMIDE 10 MG/ML
INJECTION, SOLUTION INTRAVENOUS PRN
Status: DISCONTINUED | OUTPATIENT
Start: 2023-02-15 | End: 2023-02-15 | Stop reason: SDUPTHER

## 2023-02-15 RX ORDER — SODIUM CHLORIDE 0.9 % (FLUSH) 0.9 %
5-40 SYRINGE (ML) INJECTION PRN
Status: DISCONTINUED | OUTPATIENT
Start: 2023-02-15 | End: 2023-02-15 | Stop reason: HOSPADM

## 2023-02-15 RX ORDER — INSULIN LISPRO 100 [IU]/ML
0-6 INJECTION, SOLUTION INTRAVENOUS; SUBCUTANEOUS NIGHTLY
Status: DISCONTINUED | OUTPATIENT
Start: 2023-02-16 | End: 2023-02-16

## 2023-02-15 RX ORDER — AMIODARONE HYDROCHLORIDE 200 MG/1
200 TABLET ORAL 2 TIMES DAILY
Status: DISCONTINUED | OUTPATIENT
Start: 2023-02-15 | End: 2023-02-20 | Stop reason: HOSPADM

## 2023-02-15 RX ORDER — FENTANYL CITRATE 0.05 MG/ML
INJECTION, SOLUTION INTRAMUSCULAR; INTRAVENOUS PRN
Status: DISCONTINUED | OUTPATIENT
Start: 2023-02-15 | End: 2023-02-15 | Stop reason: SDUPTHER

## 2023-02-15 RX ORDER — MIDAZOLAM HYDROCHLORIDE 2 MG/2ML
1 INJECTION, SOLUTION INTRAMUSCULAR; INTRAVENOUS
Status: DISCONTINUED | OUTPATIENT
Start: 2023-02-15 | End: 2023-02-16

## 2023-02-15 RX ORDER — ONDANSETRON 2 MG/ML
4 INJECTION INTRAMUSCULAR; INTRAVENOUS EVERY 4 HOURS PRN
Status: DISCONTINUED | OUTPATIENT
Start: 2023-02-15 | End: 2023-02-16

## 2023-02-15 RX ORDER — AMIODARONE HYDROCHLORIDE 200 MG/1
600 TABLET ORAL ONCE
Status: COMPLETED | OUTPATIENT
Start: 2023-02-15 | End: 2023-02-15

## 2023-02-15 RX ORDER — SODIUM CHLORIDE, SODIUM LACTATE, POTASSIUM CHLORIDE, CALCIUM CHLORIDE 600; 310; 30; 20 MG/100ML; MG/100ML; MG/100ML; MG/100ML
INJECTION, SOLUTION INTRAVENOUS CONTINUOUS PRN
Status: DISCONTINUED | OUTPATIENT
Start: 2023-02-15 | End: 2023-02-15 | Stop reason: SDUPTHER

## 2023-02-15 RX ORDER — DEXTROSE, SODIUM CHLORIDE, AND POTASSIUM CHLORIDE 5; .45; .15 G/100ML; G/100ML; G/100ML
INJECTION INTRAVENOUS CONTINUOUS
Status: DISCONTINUED | OUTPATIENT
Start: 2023-02-15 | End: 2023-02-16

## 2023-02-15 RX ORDER — FAMOTIDINE 20 MG/1
20 TABLET, FILM COATED ORAL 2 TIMES DAILY
Status: DISCONTINUED | OUTPATIENT
Start: 2023-02-15 | End: 2023-02-16

## 2023-02-15 RX ORDER — ETOMIDATE 2 MG/ML
INJECTION INTRAVENOUS PRN
Status: DISCONTINUED | OUTPATIENT
Start: 2023-02-15 | End: 2023-02-15 | Stop reason: SDUPTHER

## 2023-02-15 RX ORDER — MORPHINE SULFATE 4 MG/ML
3 INJECTION, SOLUTION INTRAMUSCULAR; INTRAVENOUS
Status: DISCONTINUED | OUTPATIENT
Start: 2023-02-15 | End: 2023-02-16

## 2023-02-15 RX ORDER — CHLORHEXIDINE GLUCONATE 0.12 MG/ML
10 RINSE ORAL 2 TIMES DAILY
Status: DISCONTINUED | OUTPATIENT
Start: 2023-02-15 | End: 2023-02-16

## 2023-02-15 RX ORDER — SODIUM CHLORIDE 0.9 % (FLUSH) 0.9 %
5-40 SYRINGE (ML) INJECTION PRN
Status: DISCONTINUED | OUTPATIENT
Start: 2023-02-15 | End: 2023-02-16

## 2023-02-15 RX ORDER — ALBUMIN, HUMAN INJ 5% 5 %
25 SOLUTION INTRAVENOUS ONCE
Status: COMPLETED | OUTPATIENT
Start: 2023-02-15 | End: 2023-02-15

## 2023-02-15 RX ORDER — VECURONIUM BROMIDE 1 MG/ML
INJECTION, POWDER, LYOPHILIZED, FOR SOLUTION INTRAVENOUS PRN
Status: DISCONTINUED | OUTPATIENT
Start: 2023-02-15 | End: 2023-02-15 | Stop reason: SDUPTHER

## 2023-02-15 RX ADMIN — SODIUM CHLORIDE, PRESERVATIVE FREE 10 ML: 5 INJECTION INTRAVENOUS at 21:12

## 2023-02-15 RX ADMIN — ROCURONIUM BROMIDE 20 MG: 50 INJECTION, SOLUTION INTRAVENOUS at 11:03

## 2023-02-15 RX ADMIN — ONDANSETRON 4 MG: 2 INJECTION INTRAMUSCULAR; INTRAVENOUS at 19:33

## 2023-02-15 RX ADMIN — INSULIN HUMAN 2 UNITS: 100 INJECTION, SOLUTION PARENTERAL at 09:54

## 2023-02-15 RX ADMIN — VECURONIUM BROMIDE 3 MG: 1 INJECTION, POWDER, LYOPHILIZED, FOR SOLUTION INTRAVENOUS at 08:21

## 2023-02-15 RX ADMIN — VECURONIUM BROMIDE 2 MG: 1 INJECTION, POWDER, LYOPHILIZED, FOR SOLUTION INTRAVENOUS at 09:15

## 2023-02-15 RX ADMIN — ROSUVASTATIN CALCIUM 40 MG: 20 TABLET, COATED ORAL at 21:12

## 2023-02-15 RX ADMIN — NICARDIPINE HYDROCHLORIDE 0.1 MG: 0.1 INJECTION INTRAVENOUS at 09:56

## 2023-02-15 RX ADMIN — FENTANYL CITRATE 250 MCG: 0.05 INJECTION, SOLUTION INTRAMUSCULAR; INTRAVENOUS at 08:08

## 2023-02-15 RX ADMIN — CEFAZOLIN 2000 MG: 10 INJECTION, POWDER, FOR SOLUTION INTRAVENOUS at 19:52

## 2023-02-15 RX ADMIN — Medication 2000 MG: at 11:45

## 2023-02-15 RX ADMIN — DEXMEDETOMIDINE HYDROCHLORIDE 0.5 MCG/KG/HR: 4 INJECTION, SOLUTION INTRAVENOUS at 10:52

## 2023-02-15 RX ADMIN — FENTANYL CITRATE 150 MCG: 0.05 INJECTION, SOLUTION INTRAMUSCULAR; INTRAVENOUS at 11:12

## 2023-02-15 RX ADMIN — FENTANYL CITRATE 150 MCG: 0.05 INJECTION, SOLUTION INTRAMUSCULAR; INTRAVENOUS at 09:51

## 2023-02-15 RX ADMIN — INSULIN HUMAN 5 UNITS: 100 INJECTION, SOLUTION PARENTERAL at 10:59

## 2023-02-15 RX ADMIN — ETOMIDATE 14 MG: 2 INJECTION, SOLUTION INTRAVENOUS at 07:38

## 2023-02-15 RX ADMIN — Medication 2.5 MG: at 10:11

## 2023-02-15 RX ADMIN — Medication 5 MG: at 09:23

## 2023-02-15 RX ADMIN — CHLORHEXIDINE GLUCONATE 10 ML: 1.2 RINSE ORAL at 14:00

## 2023-02-15 RX ADMIN — LIDOCAINE HYDROCHLORIDE 100 MG: 20 INJECTION, SOLUTION EPIDURAL; INFILTRATION; INTRACAUDAL; PERINEURAL at 07:38

## 2023-02-15 RX ADMIN — MIDAZOLAM 2 MG: 1 INJECTION INTRAMUSCULAR; INTRAVENOUS at 07:02

## 2023-02-15 RX ADMIN — SODIUM CHLORIDE, SODIUM LACTATE, POTASSIUM CHLORIDE, AND CALCIUM CHLORIDE: 600; 310; 30; 20 INJECTION, SOLUTION INTRAVENOUS at 07:02

## 2023-02-15 RX ADMIN — HEPARIN SODIUM 30000 UNITS: 1000 INJECTION INTRAVENOUS; SUBCUTANEOUS at 09:44

## 2023-02-15 RX ADMIN — AMIODARONE HYDROCHLORIDE 200 MG: 200 TABLET ORAL at 21:12

## 2023-02-15 RX ADMIN — NITROGLYCERIN 20 MCG: 20 INJECTION INTRAVENOUS at 09:49

## 2023-02-15 RX ADMIN — PHENYLEPHRINE HYDROCHLORIDE 100 MCG: 10 INJECTION INTRAVENOUS at 07:47

## 2023-02-15 RX ADMIN — MORPHINE SULFATE 4 MG: 4 INJECTION INTRAVENOUS at 15:43

## 2023-02-15 RX ADMIN — NITROGLYCERIN 10 MCG/MIN: 20 INJECTION INTRAVENOUS at 08:00

## 2023-02-15 RX ADMIN — AMINOCAPROIC ACID 1 G/HR: 250 INJECTION, SOLUTION INTRAVENOUS at 08:31

## 2023-02-15 RX ADMIN — VECURONIUM BROMIDE 3 MG: 1 INJECTION, POWDER, LYOPHILIZED, FOR SOLUTION INTRAVENOUS at 10:02

## 2023-02-15 RX ADMIN — FENTANYL CITRATE 250 MCG: 0.05 INJECTION, SOLUTION INTRAMUSCULAR; INTRAVENOUS at 08:35

## 2023-02-15 RX ADMIN — NICARDIPINE HYDROCHLORIDE 0.1 MG: 0.1 INJECTION INTRAVENOUS at 09:57

## 2023-02-15 RX ADMIN — NICARDIPINE HYDROCHLORIDE 0.2 MG: 0.1 INJECTION INTRAVENOUS at 12:05

## 2023-02-15 RX ADMIN — DEXTROSE MONOHYDRATE, SODIUM CHLORIDE, AND POTASSIUM CHLORIDE: 50; 4.5; 1.49 INJECTION, SOLUTION INTRAVENOUS at 14:06

## 2023-02-15 RX ADMIN — Medication 2000 MG: at 08:12

## 2023-02-15 RX ADMIN — NICARDIPINE HYDROCHLORIDE 0.2 MG: 0.1 INJECTION INTRAVENOUS at 10:01

## 2023-02-15 RX ADMIN — ROCURONIUM BROMIDE 50 MG: 50 INJECTION, SOLUTION INTRAVENOUS at 07:38

## 2023-02-15 RX ADMIN — FAMOTIDINE 20 MG: 20 TABLET, FILM COATED ORAL at 18:14

## 2023-02-15 RX ADMIN — Medication 0.02 MCG/KG/MIN: at 11:36

## 2023-02-15 RX ADMIN — MIDAZOLAM 3 MG: 1 INJECTION INTRAMUSCULAR; INTRAVENOUS at 11:12

## 2023-02-15 RX ADMIN — SODIUM CHLORIDE: 9 INJECTION, SOLUTION INTRAVENOUS at 07:59

## 2023-02-15 RX ADMIN — VECURONIUM BROMIDE 1 MG: 1 INJECTION, POWDER, LYOPHILIZED, FOR SOLUTION INTRAVENOUS at 08:35

## 2023-02-15 RX ADMIN — OXYCODONE AND ACETAMINOPHEN 1 TABLET: 5; 325 TABLET ORAL at 18:14

## 2023-02-15 RX ADMIN — FENTANYL CITRATE 200 MCG: 0.05 INJECTION, SOLUTION INTRAMUSCULAR; INTRAVENOUS at 11:44

## 2023-02-15 RX ADMIN — Medication 3 AMPULE: at 07:08

## 2023-02-15 RX ADMIN — AMIODARONE HYDROCHLORIDE 600 MG: 200 TABLET ORAL at 06:21

## 2023-02-15 RX ADMIN — MORPHINE SULFATE 3 MG: 4 INJECTION, SOLUTION INTRAMUSCULAR; INTRAVENOUS at 14:04

## 2023-02-15 RX ADMIN — SODIUM CHLORIDE, POTASSIUM CHLORIDE, SODIUM LACTATE AND CALCIUM CHLORIDE: 600; 310; 30; 20 INJECTION, SOLUTION INTRAVENOUS at 06:53

## 2023-02-15 RX ADMIN — ROCURONIUM BROMIDE 30 MG: 50 INJECTION, SOLUTION INTRAVENOUS at 11:12

## 2023-02-15 RX ADMIN — KETOROLAC TROMETHAMINE 30 MG: 30 INJECTION, SOLUTION INTRAMUSCULAR at 16:23

## 2023-02-15 RX ADMIN — AMINOCAPROIC ACID 10000 MG: 250 INJECTION, SOLUTION INTRAVENOUS at 08:00

## 2023-02-15 RX ADMIN — PHENYLEPHRINE HYDROCHLORIDE 100 MCG: 10 INJECTION INTRAVENOUS at 07:59

## 2023-02-15 RX ADMIN — ALBUMIN (HUMAN) 25 G: 12.5 INJECTION, SOLUTION INTRAVENOUS at 19:30

## 2023-02-15 RX ADMIN — Medication 10 MG: at 08:16

## 2023-02-15 ASSESSMENT — PAIN SCALES - GENERAL
PAINLEVEL_OUTOF10: 10
PAINLEVEL_OUTOF10: 0
PAINLEVEL_OUTOF10: 6
PAINLEVEL_OUTOF10: 2
PAINLEVEL_OUTOF10: 2
PAINLEVEL_OUTOF10: 9

## 2023-02-15 ASSESSMENT — PAIN DESCRIPTION - DESCRIPTORS
DESCRIPTORS: ACHING;SORE
DESCRIPTORS: ACHING;THROBBING
DESCRIPTORS: SORE

## 2023-02-15 ASSESSMENT — PULMONARY FUNCTION TESTS
PIF_VALUE: 18
PIF_VALUE: 21

## 2023-02-15 ASSESSMENT — PAIN DESCRIPTION - PAIN TYPE
TYPE: SURGICAL PAIN
TYPE: SURGICAL PAIN

## 2023-02-15 ASSESSMENT — PAIN DESCRIPTION - LOCATION
LOCATION: CHEST

## 2023-02-15 ASSESSMENT — PAIN DESCRIPTION - FREQUENCY: FREQUENCY: CONTINUOUS

## 2023-02-15 ASSESSMENT — PAIN DESCRIPTION - ORIENTATION
ORIENTATION: MID

## 2023-02-15 ASSESSMENT — PAIN - FUNCTIONAL ASSESSMENT
PAIN_FUNCTIONAL_ASSESSMENT: ACTIVITIES ARE NOT PREVENTED
PAIN_FUNCTIONAL_ASSESSMENT: 0-10
PAIN_FUNCTIONAL_ASSESSMENT: ACTIVITIES ARE NOT PREVENTED

## 2023-02-15 ASSESSMENT — PAIN DESCRIPTION - ONSET: ONSET: AWAKENED FROM SLEEP

## 2023-02-15 NOTE — PERIOP NOTE
TRANSFER - OUT REPORT:    Verbal [facility]  being transferred to CVICU for routine progression of patient care       Report consisted of patient's Situation, Background, Assessment and   Recommendations(SBAR). Information from the following report(s) Surgery Report was reviewed with the receiving nurse. [place] Assessment: No data recorded  Lines:   Peripheral IV 02/15/23 Left;Dorsal Hand (Active)   Site Assessment Clean, dry & intact 02/15/23 0655   Line Status Blood return noted; Infusing 02/15/23 0655   Phlebitis Assessment No symptoms 02/15/23 0655   Infiltration Assessment 0 02/15/23 0655   Alcohol Cap Used No 02/15/23 0655   Dressing Status New dressing applied;Clean, dry & intact 02/15/23 0655   Dressing Type Transparent 02/15/23 0655   Dressing Intervention New 02/15/23 0655       Arterial Line 02/15/23 Radial (Active)        Opportunity for questions and clarification was provided.       Patient transported with:  Monitor, O2 @ 15 lpm, and Registered Nurse  CRNA  MDA

## 2023-02-15 NOTE — CARE COORDINATION
Chart screened by  for discharge planning. No needs identified at this time. Please consult  if any new issues arise.        02/15/23 4559   Condition of Participation: Discharge Planning   The Plan for Transition of Care is related to the following treatment goals: pending clinical progress

## 2023-02-15 NOTE — ANESTHESIA PROCEDURE NOTES
Airway  Date/Time: 2/15/2023 7:42 AM  Urgency: elective    Airway not difficult    General Information and Staff    Patient location during procedure: OR  Resident/CRNA: HALI Fraire - CRNA  Performed: resident/CRNA     Indications and Patient Condition  Indications for airway management: anesthesia  Spontaneous Ventilation: absent  Sedation level: deep  Preoxygenated: yes  Patient position: sniffing  MILS not maintained throughout  Mask difficulty assessment: vent by bag mask + OA or adjuvant +/- NMBA    Final Airway Details  Final airway type: endotracheal airway      Successful airway: ETT  Cuffed: yes   Successful intubation technique: direct laryngoscopy  Endotracheal tube insertion site: oral  Blade: Peterson  Blade size: #3  ETT size (mm): 8.0  Cormack-Lehane Classification: grade I - full view of glottis  Placement verified by: chest auscultation and capnometry   Inital cuff pressure (cm H2O): 25  Cuff volume (mL): 10  Measured from: lips  ETT to lips (cm): 24  Number of attempts at approach: 1  Number of other approaches attempted: 0    no

## 2023-02-15 NOTE — PROGRESS NOTES
Patient out from operating room and placed on the ventilator on documented settings. Patient is orally intubated with a # 8.0 ET Tube secured at the 24 cm víctor at the lip. Breath sounds are coarse and diminished. Trachea is midline. Negative for subcutaneous air, chest excursion is symmetric. Negative  for pitting edema. Patient is also negative for cyanosis. Patient has a Left Radial arterial line. Patient has 3 Chest tubes. All alarms are set and audible. Resuscitation bag is at the head of the bed. Ventilator Settings  Mode FIO2 Rate Tidal Volume Pressure PEEP I:E Ratio              12 cm H2O            Peak airway pressure:     Minute ventilation:       ABG: No results for input(s): PH, PCO2, PO2, HCO3 in the last 72 hours.       Maria Guadalupe Benavides RCP

## 2023-02-15 NOTE — ANESTHESIA PROCEDURE NOTES
Arterial Line:    An arterial line was placed using ultrasound guidance, in the OR for the following indication(s): continuous blood pressure monitoring and blood sampling needed. A 20 gauge (size) (length), Arrow (type) catheter was placed, Seldinger technique used, into the left radial artery, secured by Tegaderm and tape. Anesthesia type: Local  Local infiltration: Injection    Events:  patient tolerated procedure well with no complications and EBL < 5mL. Additional notes:  Left arm prepped with ChloraPrep, 0.8ml of 1% lidocaine infiltrated at skin, ultrasound guided Seldinger technique, good blood return, good waveform. Potential access sites were examined with ultrasound and acceptable patent access site selected as noted above. Needle path and artery access visualized in real time using ultrasound, an image of wire in vessel recorded for permanent record.     2/15/2023 7:07 AM2/15/2023 7:09 AM  Resident/CRNA: HALI Fraire - CRNA  Performed: Resident/CRNA   Preanesthetic Checklist  Completed: patient identified, IV checked, site marked, risks and benefits discussed, surgical/procedural consents, equipment checked, pre-op evaluation, timeout performed, anesthesia consent given, oxygen available, monitors applied/VS acknowledged, fire risk safety assessment completed and verbalized and blood product R/B/A discussed and consented

## 2023-02-15 NOTE — PROGRESS NOTES
PT was in bed recovering from surgery. Family was at bedside. [address] introduced self. [address] checked for unmet needs.  offered support. Rev. Edilia De Souza M.Div.

## 2023-02-15 NOTE — CONSULTS
Cardiovascular ICU Consult Note: 2/15/2023  Lynn Chan                                                     Admission Date: 2/15/2023     The patient's chart is reviewed and the patient is discussed with the staff. Subjective:     Patient is seen at the request of Linda Morris MD  for respiratory management status post cardiac surgery. Patient had Coronary artery disease (CAD)  with multiple vessel disease despite prior cardiac stents with dyspnea. Now post CABG x 3. Currently is sedated in CV-ICU and orally intubated receiving  mechanical ventilation. We have been asked to see in the CV-ICU for mechanical ventilation management and weaning. Patient also with history of DM/HTN/dyslipidemia with prior cardiac stents. Current Outpatient Medications   Medication Instructions    acetaminophen (TYLENOL) 650 mg, Oral, 2 times daily    alogliptin (NESINA) 12.5 mg, Oral, DAILY    amiodarone (CORDARONE) 600 mg, Oral, ONCE, Take 3 tablets after 4pm the evening before surgery.     aspirin 81 mg, Oral, DAILY    Coenzyme Q10 (CO Q 10) 10 MG CAPS 1 capsule, Oral, DAILY    cyanocobalamin 1,000 mcg, Oral, DAILY    ferrous sulfate (IRON 325) 325 mg, Oral, DAILY WITH BREAKFAST    finasteride (PROSCAR) 5 mg, Oral, DAILY    glipiZIDE (GLUCOTROL) 10 mg, Oral, 2 TIMES DAILY BEFORE MEALS    HYDROcodone-acetaminophen (NORCO)  MG per tablet 1 tablet, Oral, 2 times daily    insulin glargine (LANTUS;BASAGLAR) 16 Units, SubCUTAneous, NIGHTLY    isosorbide mononitrate (IMDUR) 30 mg, Oral, DAILY    losartan (COZAAR) 100 mg, Oral, DAILY    magnesium oxide (MAG-OX) 400 MG tablet 1 tablet, Oral, 2 TIMES DAILY    metFORMIN (GLUCOPHAGE) 1,000 mg, Oral, 2 TIMES DAILY WITH MEALS    metoprolol succinate (TOPROL XL) 25 mg, Oral, DAILY    Multiple Vitamins-Minerals (MULTIVITAMIN WITH MINERALS) tablet 1 tablet, Oral, DAILY    naloxone 4 MG/0.1ML LIQD nasal spray 1 spray, ONCE PRN    nitroGLYCERIN (NITROSTAT) 0.4 mg, SubLINGual, EVERY 5 MIN PRN, up to max of 3 total doses. If no relief after 1 dose, call 911. rosuvastatin (CRESTOR) 40 mg, Oral, DAILY    SURE COMFORT PEN NEEDLES 32G X 6 MM MISC No dose, route, or frequency recorded. Review of Systems: unable to determine due to intubated status  Past Medical History:   Diagnosis Date    Cancer ([place])     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  22    Chronic pain     FLORECITA-- multi    Coronary artery disease     mi 2012-- stents x   - -- followed by dr Raquel Dominguez    Dyslipidemia     Enlarged prostate     Environmental allergies     Essential hypertension     controlled with med    Type 2 diabetes mellitus ([place])     type 2-- does not check sqbs at home--     Past Surgical History:   Procedure Laterality Date    CARDIAC PROCEDURE N/A 2023    LEFT HEART CATH / CORONARY ANGIOGRAPHY performed by Korin Pearson MD at [address] [place] [address] CATH LAB    COLONOSCOPY      DIAGNOSTIC CARDIAC CATH LAB PROCEDURE      FACIAL SURGERY Left 2023    MOHs on left side of nose    PTCA  2012     Social History     Socioeconomic History    Marital status:      Spouse name: Not on file    Number of children: Not on file    Years of education: Not on file    Highest education level: Not on file   Occupational History    Occupation: retired from [employer].  also served in the [address] [place] [address]    Smoking status: Former     Packs/day: 0.25     Years: 3.00     Pack years: 0.75     Types: Cigarettes     Quit date: 1972     Years since quittin.1     Passive exposure: Current (wife smokes in the home)    Smokeless tobacco: Never   Vaping Use    Vaping Use: Never used   Substance and Sexual Activity    Alcohol use: Never    Drug use: Never    Sexual activity: Not on file   Other Topics Concern    Not on file   Social History Narrative    Not on file     Social Determinants of Health     Financial Resource Strain: Not on file   Food Insecurity: Not on file   Transportation Needs: Not on file   Physical Activity: Not on file   Stress: Not on file   Social Connections: Not on file   Intimate Partner Violence: Not on file   Housing Stability: Not on file     Family History   Problem Relation Age of Onset    Heart Attack Mother     Dementia Mother     Heart Attack Father     Other Father         choked    Other Sister         ? complications from encephalitis    No Known Problems Brother      Allergies   Allergen Reactions    Naproxen Itching    Ampicillin Hives    Atorvastatin Itching    Farxiga [Dapagliflozin] Hives    Sulfa Antibiotics Hives     Objective:   Blood pressure (!) 100/50, pulse 75, temperature 98.4 °F (36.9 °C), temperature source Bladder, resp. rate 17, height 5' 10\" (1.778 m), weight 163 lb 6.4 oz (74.1 kg), SpO2 100 %. Intake/Output Summary (Last 24 hours) at 2/15/2023 1301  Last data filed at 2/15/2023 1245  Gross per 24 hour   Intake 2450 ml   Output 530 ml   Net 1920 ml     Physical Exam:          Constitutional:  Sedated, orally intubated and mechanically ventilated. EENMT:  Sclera clear, pupils equal, oral mucosa moist and orally intubated  Respiratory: CTA b/l  Cardiovascular:  RRR with  rub  Gastrointestinal:  soft; no bowel sounds present  Musculoskeletal:  warm with no cyanosis, no lower extremity edema. [place] site left leg with ace wrap. Sedated with no movements. SKIN:  no jaundice or ecchymosis   Neurologic:  sedated but no gross neuro deficits  Psychiatric:  sedated and unable to assess at this time    CXR:  pending. LINES:  ETT, randall, swan sondra, arterial line, chest tubes times x 3 in epigastric area without air leak.     DRIPS:   Dose (mcg/kg/hr) Dexmedetomidine: 0.5 mcg/kg/hr  Dose (mcg/min) Epinephrine: 0 mcg/min  Dose (mcg/kg/min) Phenylephrine : *0 mcg/min  Dose (mg/hr) Nicardipine: *0.2 mg  Dose (units/hr) Insulin : 6 Units/hr     CI:  CO (l/min): 4.5 l/min  CI (l/min/m2): 2.3 l/min/m2    Ventilator Settings:  Ideal body weight: 73 kg (160 lb 15 oz)  Adjusted ideal body weight: 73.4 kg (161 lb 14.7 oz)  Mode FIO2 Rate Tidal Volume Pressure   SIMV/VC    60 %  18 bmp     500 mL   8     Peak airway pressure:     Minute ventilation:    Recent Labs     02/15/23  1057 02/15/23  1132 02/15/23  1159   PHAPOC 7.34* 7.35 7.34*   [id number] 43.1 41.8 37.6   VJ5MMVB 336* 248* 120*   JNK3JFU 23.2 22.9 20.4*     Recent Labs     02/13/23  0920   WBC 7.6   HGB 13.9   HCT 41.8      INR 1.0     Recent Labs     02/13/23  0920      K 4.0      CO2 26   BUN 14   CREATININE 1.20   MG 1.9     No results for input(s): PROCAL, LACTATE in the last 72 hours. 02/09/23    TRANSTHORACIC ECHOCARDIOGRAM (TTE) COMPLETE (CONTRAST/BUBBLE/3D PRN) 02/09/2023  1:02 PM, 02/09/2023 12:00 AM (Final)    Interpretation Summary    Left Ventricle: Moderately reduced left ventricular systolic function with a visually estimated EF of 40 - 45%. Left ventricle is mildly dilated. Mildly increased wall thickness. Moderate global hypokinesis present. Abnormal diastolic function. Aortic Valve: Valve structure is normal. Mild sclerosis of the aortic valve cusp. Mitral Valve: Mild regurgitation. Left Atrium: Left atrium is moderately dilated. Right Atrium: Right atrium is mildly dilated. Technical qualifiers: Color flow Doppler was performed and pulse wave and/or continuous wave Doppler was performed. Contrast used: Definity. Signed by: Rachel Valentin MD on 2/9/2023  1:02 PM, Signed by: Unknown Provider Result on 2/9/2023 12:00 AM    Assessment and Plan :  (Medical Decision Making)   Impression: 75 y.o. y/o male s/p CV surgery for Coronary artery disease (CAD) excluded    Encounter for weaning from Ventilator:  Post op ABG and CXR pending. Continue with weaning per protocol. Hypoxemia:   Once weaned from ventilator will work on IS, mobility and weaning O2. Bronchodilators per protocol. Atelectasis:   IS, mobility after extubated.     S/P Cardiovascular surgery:  S/p CABG x 3  Monitor chest tube output, removal per surgery. More than 50% of the time documented was spent in face-to-face contact with the patient and in the care of the patient on the floor/unit where the patient is located. Thank you for this referral.  We appreciate the opportunity to participate in this patient's care. Will follow along with you.     Jose De Jesus MD

## 2023-02-15 NOTE — PROGRESS NOTES
Respiratory Mechanics completed and are as follows:     Patient extubated to AIRVO 40l 40%. Patient is able to communicate and  is negative for stridor. Breath sounds are diminished. No complications with extubation.   NIF -23cm H2O    Conchis Redman RCP

## 2023-02-15 NOTE — ANESTHESIA PRE PROCEDURE
Department of Anesthesiology  Preprocedure Note       Name:  Helga Noble   Age:  75 y.o.  :  1950                                          MRN:  [id number]         Date:  2/15/2023      Surgeon: Jeevan London):  Nikita Buckley MD    Procedure: Procedure(s):  CABG CORONARY ARTERY BYPASS/OR #5    Medications prior to admission:   Prior to Admission medications    Medication Sig Start Date End Date Taking? Authorizing Provider   Multiple Vitamins-Minerals (MULTIVITAMIN WITH MINERALS) tablet Take 1 tablet by mouth daily    Historical Provider, MD   Coenzyme Q10 (CO Q 10) 10 MG CAPS Take 1 capsule by mouth daily    Historical Provider, MD   amiodarone (CORDARONE) 200 MG tablet Take 3 tablets by mouth once for 1 dose Take 3 tablets after 4pm the evening before surgery. Patient not taking: Reported on 2/15/2023 2/13/23 2/15/23  Avelina Holstein JOAN Weiss   losartan (COZAAR) 100 MG tablet Take 1 tablet by mouth daily 1/10/23   Chris Chavarria MD   glipiZIDE (GLUCOTROL) 10 MG tablet Take 10 mg by mouth 2 times daily (before meals) 1/10/23   Chris Chavarria MD   alogliptin (NESINA) 12.5 MG TABS tablet Take 12.5 mg by mouth daily 21   Historical Provider, MD   acetaminophen (TYLENOL) 325 MG tablet Take 650 mg by mouth in the morning and at bedtime 21   Historical Provider, MD   aspirin 81 MG EC tablet Take 81 mg by mouth daily 16   Historical Provider, MD   cyanocobalamin 1000 MCG tablet Take 1,000 mcg by mouth daily    Historical Provider, MD   ferrous sulfate (IRON 325) 325 (65 Fe) MG tablet Take 325 mg by mouth daily (with breakfast) 22   Historical Provider, MD   finasteride (PROSCAR) 5 MG tablet Take 5 mg by mouth daily 19   Historical Provider, MD   HYDROcodone-acetaminophen (NORCO)  MG per tablet Take 1 tablet by mouth in the morning and at bedtime.  22   Historical Provider, MD   insulin glargine (LANTUS;BASAGLAR) 100 UNIT/ML injection pen Inject 16 Units into the skin nightly 7/27/22   Historical Provider, MD   SURE COMFORT PEN NEEDLES 32G X 6 MM [facility]  10/17/22   Historical Provider, MD   metFORMIN (GLUCOPHAGE) 1000 MG tablet Take 1,000 mg by mouth 2 times daily (with meals) 4/16/21   Historical Provider, MD   magnesium oxide (MAG-OX) 400 MG tablet Take 1 tablet by mouth 2 times daily 7/27/22 2/24/23  Historical Provider, MD   rosuvastatin (CRESTOR) 40 MG tablet Take 40 mg by mouth daily    Historical Provider, MD   naloxone 4 MG/0.1ML LIQD nasal spray 1 spray once as needed 10/13/21   Historical Provider, MD   metoprolol succinate (TOPROL XL) 25 MG extended release tablet Take 1 tablet by mouth daily 12/7/22   Mary Chairez MD   isosorbide mononitrate (IMDUR) 30 MG extended release tablet Take 1 tablet by mouth daily 12/7/22   Mary Chairez MD   nitroGLYCERIN (NITROSTAT) 0.4 MG SL tablet Place 1 tablet under the tongue every 5 minutes as needed for Chest pain (X 3 DOSES, THEN CALL 911) up to max of 3 total doses.  If no relief after 1 dose, call 911. 12/7/22   Mary Chairez MD       Current medications:    Current Facility-Administered Medications   Medication Dose Route Frequency Provider Last Rate Last Admin    lidocaine 1 % injection 1 mL  1 mL IntraDERmal Once PRN Ibis Westfall MD        fentaNYL (SUBLIMAZE) injection 25 mcg  25 mcg IntraVENous Once PRN Ibis Westfall MD        Or    fentaNYL (SUBLIMAZE) injection 100 mcg  100 mcg IntraVENous Once PRN Ibis Westfall MD        lactated ringers IV soln infusion   IntraVENous Continuous Ibis Westfall MD        sodium chloride flush 0.9 % injection 5-40 mL  5-40 mL IntraVENous 2 times per day Ibis Westfall MD        sodium chloride flush 0.9 % injection 5-40 mL  5-40 mL IntraVENous PRN Ibis Westfall MD        0.9 % sodium chloride infusion   IntraVENous PRN Ibis Westfall MD        midazolam (VERSED) PF injection 2 mg/2 mL  2 mg IntraVENous Once PRN Ibis Westfall MD        sodium chloride flush 0.9 % injection 5-40 mL  5-40 mL IntraVENous 2 times per day Edil Winston APRN - CNP        sodium chloride flush 0.9 % injection 5-40 mL  5-40 mL IntraVENous PRN Edil Winston, APRN - CNP        0.9 % sodium chloride infusion   IntraVENous PRN Edil Winston APRN - CNP        ceFAZolin (ANCEF) 2000 mg in sterile water 20 mL IV syringe  2,000 mg IntraVENous On Call to [address] [place] [address], APRN - CNP        metoprolol tartrate (LOPRESSOR) tablet 12.5 mg  12.5 mg Oral Once Edil Winston APRN - CNP           Allergies:     Allergies   Allergen Reactions    Naproxen Itching    Ampicillin Hives    Atorvastatin Itching    Farxiga [Dapagliflozin] Hives    Sulfa Antibiotics Hives       Problem List:    Patient Active Problem List   Diagnosis Code    Type 2 diabetes mellitus ([facility] [address] E11.9    Essential hypertension I10    Coronary artery disease I25.10    Dyslipidemia E78.5    Accelerating angina ([facility] [address] I20.0    Skin cancer of nose C44.301    Coronary artery disease (CAD) excluded Z03.89       Past Medical History:        Diagnosis Date    Cancer ([facility] [address]     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  1/26/23    Chronic pain     FLORECITA-- multi    Coronary artery disease     mi 7/2012-- stents x 2  - 2012-- followed by dr Tiara Rivera Dyslipidemia     Enlarged prostate     Environmental allergies     Essential hypertension     controlled with med    Type 2 diabetes mellitus ([facility] [address]     type 2-- does not check sqbs at home--       Past Surgical History:        Procedure Laterality Date    CARDIAC PROCEDURE N/A 02/09/2023    LEFT HEART CATH / CORONARY ANGIOGRAPHY performed by Nga Lebron MD at [address] [place] [address] CATH LAB    COLONOSCOPY      DIAGNOSTIC CARDIAC CATH LAB PROCEDURE      FACIAL SURGERY Left 01/26/2023    MOHs on left side of nose    PTCA  07/2012       Social History:    Social History     Tobacco Use    Smoking status: Former     Packs/day: 0.25     Years: 3.00     Pack years: 0.75     Types: Cigarettes     Quit date: 0     Years since quittin.1     Passive exposure: Current (wife smokes in the home)    Smokeless tobacco: Never   Substance Use Topics    Alcohol use: Never                                Counseling given: Not Answered      Vital Signs (Current):   Vitals:    02/15/23 0550   BP: 136/65   Pulse: 85   Resp: 18   Temp: 98.3 °F (36.8 °C)   TempSrc: Oral   SpO2: 95%   Weight: 163 lb 6.4 oz (74.1 kg)   Height: 5' 10\" (1.778 m)                                              BP Readings from Last 3 Encounters:   02/15/23 136/65   23 (!) 154/82   23 120/70       NPO Status:                                                                                 BMI:   Wt Readings from Last 3 Encounters:   02/15/23 163 lb 6.4 oz (74.1 kg)   23 164 lb 9.6 oz (74.7 kg)   23 163 lb (73.9 kg)     Body mass index is 23.45 kg/m². CBC:   Lab Results   Component Value Date/Time    WBC 7.6 2023 09:20 AM    RBC 4.86 2023 09:20 AM    HGB 13.9 2023 09:20 AM    HCT 41.8 2023 09:20 AM    MCV 86.0 2023 09:20 AM    RDW 13.2 2023 09:20 AM     2023 09:20 AM       CMP:   Lab Results   Component Value Date/Time     2023 09:20 AM    K 4.0 2023 09:20 AM     2023 09:20 AM    CO2 26 2023 09:20 AM    BUN 14 2023 09:20 AM    CREATININE 1.20 2023 09:20 AM    LABGLOM >60 2023 09:20 AM    GLUCOSE 281 2023 09:20 AM    PROT 7.8 2023 09:20 AM    CALCIUM 10.0 2023 09:20 AM    BILITOT 0.4 2023 09:20 AM    ALKPHOS 43 2023 09:20 AM    AST 18 2023 09:20 AM    ALT 33 2023 09:20 AM       POC Tests: No results for input(s): POCGLU, POCNA, POCK, POCCL, POCBUN, POCHEMO, POCHCT in the last 72 hours.     Coags:   Lab Results   Component Value Date/Time    PROTIME 13.4 2023 09:20 AM    INR 1.0 2023 09:20 AM    APTT 26.0 2023 09:20 AM       HCG (If Applicable): No results found for: PREGTESTUR, PREGSERUM, HCG, HCGQUANT     ABGs: No results found for: PHART, PO2ART, TKX6FVI, OEO4JBV, BEART, X9VULFVI     Type & Screen (If Applicable):  No results found for: LABABO, LABRH    Drug/Infectious Status (If Applicable):  No results found for: HIV, HEPCAB    COVID-19 Screening (If Applicable): No results found for: COVID19        Anesthesia Evaluation  Patient summary reviewed and Nursing notes reviewed no history of anesthetic complications:   Airway: Mallampati: II  TM distance: >3 FB   Neck ROM: full  Mouth opening: > = 3 FB   Dental: normal exam         Pulmonary:Negative Pulmonary ROS and normal exam                               Cardiovascular:  Exercise tolerance: biltateral UE pain after ambulation.   (+) hypertension:, angina: with exertion, CAD:,                ROS comment: [facility] 2/9/23: Severe distal left main stenosis at least 80% which she has compromising flow to the LAD ramus and circumflex  Totally occluded mid right with collateral filling of distal right from the distal circumflex  Mildly depressed ejection fraction 45 to 50%       Neuro/Psych:   Negative Neuro/Psych ROS              GI/Hepatic/Renal: Neg GI/Hepatic/Renal ROS            Endo/Other:    (+) DiabetesType II DM, , malignancy/cancer (scc right nare). Abdominal:             Vascular:   + PVD, aortic or cerebral, . ROS comment: IMPRESSION:  Stenosis of the right internal carotid artery in a range of 70-99%. Stenosis at the left internal carotid artery in a range of 50-69%.     . Other Findings:           Anesthesia Plan      general     ASA 4       Induction: intravenous. arterial line, CVP, BIS, central line, BAUTISTA and continuous noninvasive hemodynamic monitor  MIPS: Postoperative ventilation. Anesthetic plan and risks discussed with patient and child/children. Use of blood products discussed with patient whom consented to blood products.                      Luz Marina Moise MD 2/15/2023

## 2023-02-16 ENCOUNTER — APPOINTMENT (OUTPATIENT)
Dept: GENERAL RADIOLOGY | Age: 73
DRG: 236 | End: 2023-02-16
Attending: THORACIC SURGERY (CARDIOTHORACIC VASCULAR SURGERY)
Payer: OTHER GOVERNMENT

## 2023-02-16 PROBLEM — J93.9 PNEUMOTHORAX ON LEFT: Status: ACTIVE | Noted: 2023-02-16

## 2023-02-16 PROBLEM — Z99.11 ENCOUNTER FOR WEANING FROM VENTILATOR (HCC): Status: RESOLVED | Noted: 2023-02-15 | Resolved: 2023-02-16

## 2023-02-16 LAB
ANION GAP SERPL CALC-SCNC: 8 MMOL/L (ref 2–11)
BUN SERPL-MCNC: 16 MG/DL (ref 8–23)
CALCIUM SERPL-MCNC: 8.1 MG/DL (ref 8.3–10.4)
CHLORIDE SERPL-SCNC: 114 MMOL/L (ref 101–110)
CO2 SERPL-SCNC: 19 MMOL/L (ref 21–32)
CREAT SERPL-MCNC: 1 MG/DL (ref 0.8–1.5)
EKG ATRIAL RATE: 90 BPM
EKG DIAGNOSIS: NORMAL
EKG P AXIS: 61 DEGREES
EKG P-R INTERVAL: 192 MS
EKG Q-T INTERVAL: 380 MS
EKG QRS DURATION: 98 MS
EKG QTC CALCULATION (BAZETT): 464 MS
EKG R AXIS: -20 DEGREES
EKG T AXIS: -11 DEGREES
EKG VENTRICULAR RATE: 90 BPM
ERYTHROCYTE [DISTWIDTH] IN BLOOD BY AUTOMATED COUNT: 14.1 % (ref 11.9–14.6)
GLUCOSE BLD STRIP.AUTO-MCNC: 155 MG/DL (ref 65–100)
GLUCOSE BLD STRIP.AUTO-MCNC: 163 MG/DL (ref 65–100)
GLUCOSE BLD STRIP.AUTO-MCNC: 283 MG/DL (ref 65–100)
GLUCOSE BLD STRIP.AUTO-MCNC: 337 MG/DL (ref 65–100)
GLUCOSE BLD STRIP.AUTO-MCNC: 348 MG/DL (ref 65–100)
GLUCOSE SERPL-MCNC: 172 MG/DL (ref 65–100)
HCT VFR BLD AUTO: 29.8 % (ref 41.1–50.3)
HGB BLD-MCNC: 9.8 G/DL (ref 13.6–17.2)
MAGNESIUM SERPL-MCNC: 2.1 MG/DL (ref 1.8–2.4)
MCH RBC QN AUTO: 29.7 PG (ref 26.1–32.9)
MCHC RBC AUTO-ENTMCNC: 32.9 G/DL (ref 31.4–35)
MCV RBC AUTO: 90.3 FL (ref 82–102)
NRBC # BLD: 0 K/UL (ref 0–0.2)
PLATELET # BLD AUTO: 129 K/UL (ref 150–450)
PMV BLD AUTO: 10.2 FL (ref 9.4–12.3)
POTASSIUM SERPL-SCNC: 4.6 MMOL/L (ref 3.5–5.1)
RBC # BLD AUTO: 3.3 M/UL (ref 4.23–5.6)
SERVICE CMNT-IMP: ABNORMAL
SODIUM SERPL-SCNC: 141 MMOL/L (ref 133–143)
WBC # BLD AUTO: 8.9 K/UL (ref 4.3–11.1)

## 2023-02-16 PROCEDURE — 99024 POSTOP FOLLOW-UP VISIT: CPT | Performed by: PHYSICIAN ASSISTANT

## 2023-02-16 PROCEDURE — 6370000000 HC RX 637 (ALT 250 FOR IP): Performed by: THORACIC SURGERY (CARDIOTHORACIC VASCULAR SURGERY)

## 2023-02-16 PROCEDURE — 6370000000 HC RX 637 (ALT 250 FOR IP): Performed by: PHYSICIAN ASSISTANT

## 2023-02-16 PROCEDURE — 2580000003 HC RX 258: Performed by: PHYSICIAN ASSISTANT

## 2023-02-16 PROCEDURE — 2140000001 HC CVICU INTERMEDIATE R&B

## 2023-02-16 PROCEDURE — 6360000002 HC RX W HCPCS: Performed by: PHYSICIAN ASSISTANT

## 2023-02-16 PROCEDURE — 99232 SBSQ HOSP IP/OBS MODERATE 35: CPT | Performed by: INTERNAL MEDICINE

## 2023-02-16 PROCEDURE — 97530 THERAPEUTIC ACTIVITIES: CPT

## 2023-02-16 PROCEDURE — 80048 BASIC METABOLIC PNL TOTAL CA: CPT

## 2023-02-16 PROCEDURE — 6360000002 HC RX W HCPCS: Performed by: THORACIC SURGERY (CARDIOTHORACIC VASCULAR SURGERY)

## 2023-02-16 PROCEDURE — 82962 GLUCOSE BLOOD TEST: CPT

## 2023-02-16 PROCEDURE — 71045 X-RAY EXAM CHEST 1 VIEW: CPT

## 2023-02-16 PROCEDURE — 97161 PT EVAL LOW COMPLEX 20 MIN: CPT

## 2023-02-16 PROCEDURE — 93005 ELECTROCARDIOGRAM TRACING: CPT | Performed by: PHYSICIAN ASSISTANT

## 2023-02-16 PROCEDURE — 83735 ASSAY OF MAGNESIUM: CPT

## 2023-02-16 PROCEDURE — 85027 COMPLETE CBC AUTOMATED: CPT

## 2023-02-16 RX ORDER — POTASSIUM CHLORIDE 20 MEQ/1
20 TABLET, EXTENDED RELEASE ORAL 2 TIMES DAILY PRN
Status: DISCONTINUED | OUTPATIENT
Start: 2023-02-16 | End: 2023-02-20 | Stop reason: HOSPADM

## 2023-02-16 RX ORDER — SODIUM CHLORIDE 0.9 % (FLUSH) 0.9 %
5-40 SYRINGE (ML) INJECTION PRN
Status: DISCONTINUED | OUTPATIENT
Start: 2023-02-16 | End: 2023-02-20 | Stop reason: HOSPADM

## 2023-02-16 RX ORDER — SENNA AND DOCUSATE SODIUM 50; 8.6 MG/1; MG/1
1 TABLET, FILM COATED ORAL 2 TIMES DAILY
Status: DISCONTINUED | OUTPATIENT
Start: 2023-02-16 | End: 2023-02-16

## 2023-02-16 RX ORDER — DEXTROSE MONOHYDRATE 100 MG/ML
INJECTION, SOLUTION INTRAVENOUS CONTINUOUS PRN
Status: DISCONTINUED | OUTPATIENT
Start: 2023-02-16 | End: 2023-02-20 | Stop reason: HOSPADM

## 2023-02-16 RX ORDER — FAMOTIDINE 20 MG/1
20 TABLET, FILM COATED ORAL 2 TIMES DAILY
Status: DISCONTINUED | OUTPATIENT
Start: 2023-02-16 | End: 2023-02-20 | Stop reason: HOSPADM

## 2023-02-16 RX ORDER — LANOLIN ALCOHOL/MO/W.PET/CERES
6 CREAM (GRAM) TOPICAL NIGHTLY PRN
Status: DISCONTINUED | OUTPATIENT
Start: 2023-02-16 | End: 2023-02-20 | Stop reason: HOSPADM

## 2023-02-16 RX ORDER — OXYCODONE AND ACETAMINOPHEN 10; 325 MG/1; MG/1
1 TABLET ORAL EVERY 4 HOURS PRN
Status: DISCONTINUED | OUTPATIENT
Start: 2023-02-16 | End: 2023-02-16 | Stop reason: SDUPTHER

## 2023-02-16 RX ORDER — TRAMADOL HYDROCHLORIDE 50 MG/1
50 TABLET ORAL EVERY 6 HOURS PRN
Status: DISCONTINUED | OUTPATIENT
Start: 2023-02-16 | End: 2023-02-20 | Stop reason: HOSPADM

## 2023-02-16 RX ORDER — SENNA AND DOCUSATE SODIUM 50; 8.6 MG/1; MG/1
2 TABLET, FILM COATED ORAL 2 TIMES DAILY
Status: DISCONTINUED | OUTPATIENT
Start: 2023-02-16 | End: 2023-02-19

## 2023-02-16 RX ORDER — LANOLIN ALCOHOL/MO/W.PET/CERES
400 CREAM (GRAM) TOPICAL 4 TIMES DAILY PRN
Status: DISCONTINUED | OUTPATIENT
Start: 2023-02-16 | End: 2023-02-20 | Stop reason: HOSPADM

## 2023-02-16 RX ORDER — FINASTERIDE 5 MG/1
5 TABLET, FILM COATED ORAL DAILY
Status: DISCONTINUED | OUTPATIENT
Start: 2023-02-16 | End: 2023-02-20 | Stop reason: HOSPADM

## 2023-02-16 RX ORDER — POLYETHYLENE GLYCOL 3350 17 G/17G
17 POWDER, FOR SOLUTION ORAL DAILY PRN
Status: DISCONTINUED | OUTPATIENT
Start: 2023-02-16 | End: 2023-02-20 | Stop reason: HOSPADM

## 2023-02-16 RX ORDER — KETOROLAC TROMETHAMINE 15 MG/ML
15 INJECTION, SOLUTION INTRAMUSCULAR; INTRAVENOUS EVERY 6 HOURS PRN
Status: DISCONTINUED | OUTPATIENT
Start: 2023-02-16 | End: 2023-02-16

## 2023-02-16 RX ORDER — SODIUM CHLORIDE 0.9 % (FLUSH) 0.9 %
5-40 SYRINGE (ML) INJECTION EVERY 12 HOURS SCHEDULED
Status: DISCONTINUED | OUTPATIENT
Start: 2023-02-16 | End: 2023-02-20 | Stop reason: HOSPADM

## 2023-02-16 RX ORDER — LANOLIN ALCOHOL/MO/W.PET/CERES
400 CREAM (GRAM) TOPICAL 3 TIMES DAILY PRN
Status: DISCONTINUED | OUTPATIENT
Start: 2023-02-16 | End: 2023-02-20 | Stop reason: HOSPADM

## 2023-02-16 RX ORDER — INSULIN LISPRO 100 [IU]/ML
0-12 INJECTION, SOLUTION INTRAVENOUS; SUBCUTANEOUS
Status: DISCONTINUED | OUTPATIENT
Start: 2023-02-16 | End: 2023-02-20 | Stop reason: HOSPADM

## 2023-02-16 RX ORDER — FUROSEMIDE 40 MG/1
40 TABLET ORAL DAILY
Status: COMPLETED | OUTPATIENT
Start: 2023-02-17 | End: 2023-02-19

## 2023-02-16 RX ORDER — ONDANSETRON 4 MG/1
4 TABLET, ORALLY DISINTEGRATING ORAL EVERY 8 HOURS PRN
Status: DISCONTINUED | OUTPATIENT
Start: 2023-02-16 | End: 2023-02-20 | Stop reason: HOSPADM

## 2023-02-16 RX ORDER — ONDANSETRON 2 MG/ML
4 INJECTION INTRAMUSCULAR; INTRAVENOUS EVERY 6 HOURS PRN
Status: DISCONTINUED | OUTPATIENT
Start: 2023-02-16 | End: 2023-02-20 | Stop reason: HOSPADM

## 2023-02-16 RX ORDER — POTASSIUM CHLORIDE 20 MEQ/1
40 TABLET, EXTENDED RELEASE ORAL 2 TIMES DAILY PRN
Status: DISCONTINUED | OUTPATIENT
Start: 2023-02-16 | End: 2023-02-20 | Stop reason: HOSPADM

## 2023-02-16 RX ORDER — OXYCODONE HYDROCHLORIDE AND ACETAMINOPHEN 5; 325 MG/1; MG/1
1 TABLET ORAL EVERY 4 HOURS PRN
Status: DISCONTINUED | OUTPATIENT
Start: 2023-02-16 | End: 2023-02-20 | Stop reason: HOSPADM

## 2023-02-16 RX ORDER — INSULIN LISPRO 100 [IU]/ML
0-6 INJECTION, SOLUTION INTRAVENOUS; SUBCUTANEOUS NIGHTLY
Status: DISCONTINUED | OUTPATIENT
Start: 2023-02-16 | End: 2023-02-20 | Stop reason: HOSPADM

## 2023-02-16 RX ORDER — OXYCODONE HYDROCHLORIDE AND ACETAMINOPHEN 5; 325 MG/1; MG/1
1 TABLET ORAL ONCE
Status: DISCONTINUED | OUTPATIENT
Start: 2023-02-16 | End: 2023-02-16

## 2023-02-16 RX ORDER — ACETAMINOPHEN 325 MG/1
650 TABLET ORAL EVERY 4 HOURS PRN
Status: DISCONTINUED | OUTPATIENT
Start: 2023-02-16 | End: 2023-02-20 | Stop reason: HOSPADM

## 2023-02-16 RX ORDER — POTASSIUM CHLORIDE 750 MG/1
10 TABLET, EXTENDED RELEASE ORAL DAILY
Status: COMPLETED | OUTPATIENT
Start: 2023-02-17 | End: 2023-02-19

## 2023-02-16 RX ADMIN — INSULIN LISPRO 3 UNITS: 100 INJECTION, SOLUTION INTRAVENOUS; SUBCUTANEOUS at 21:26

## 2023-02-16 RX ADMIN — FAMOTIDINE 20 MG: 20 TABLET, FILM COATED ORAL at 09:01

## 2023-02-16 RX ADMIN — INSULIN LISPRO 2 UNITS: 100 INJECTION, SOLUTION INTRAVENOUS; SUBCUTANEOUS at 07:39

## 2023-02-16 RX ADMIN — ROSUVASTATIN CALCIUM 40 MG: 20 TABLET, COATED ORAL at 21:24

## 2023-02-16 RX ADMIN — OXYCODONE AND ACETAMINOPHEN 1 TABLET: 5; 325 TABLET ORAL at 02:32

## 2023-02-16 RX ADMIN — CEFAZOLIN 2000 MG: 10 INJECTION, POWDER, FOR SOLUTION INTRAVENOUS at 02:33

## 2023-02-16 RX ADMIN — AMIODARONE HYDROCHLORIDE 200 MG: 200 TABLET ORAL at 09:01

## 2023-02-16 RX ADMIN — Medication 6 MG: at 21:25

## 2023-02-16 RX ADMIN — SENNOSIDES AND DOCUSATE SODIUM 2 TABLET: 8.6; 5 TABLET ORAL at 21:25

## 2023-02-16 RX ADMIN — CARVEDILOL 3.12 MG: 3.12 TABLET, FILM COATED ORAL at 21:25

## 2023-02-16 RX ADMIN — TRAMADOL HYDROCHLORIDE 50 MG: 50 TABLET ORAL at 21:26

## 2023-02-16 RX ADMIN — OXYCODONE AND ACETAMINOPHEN 1 TABLET: 5; 325 TABLET ORAL at 13:57

## 2023-02-16 RX ADMIN — INSULIN LISPRO 8 UNITS: 100 INJECTION, SOLUTION INTRAVENOUS; SUBCUTANEOUS at 17:14

## 2023-02-16 RX ADMIN — CARVEDILOL 3.12 MG: 3.12 TABLET, FILM COATED ORAL at 09:01

## 2023-02-16 RX ADMIN — SODIUM CHLORIDE, PRESERVATIVE FREE 10 ML: 5 INJECTION INTRAVENOUS at 09:01

## 2023-02-16 RX ADMIN — OXYCODONE AND ACETAMINOPHEN 1 TABLET: 5; 325 TABLET ORAL at 18:18

## 2023-02-16 RX ADMIN — Medication 1 AMPULE: at 22:24

## 2023-02-16 RX ADMIN — OXYCODONE AND ACETAMINOPHEN 1 TABLET: 5; 325 TABLET ORAL at 00:18

## 2023-02-16 RX ADMIN — SODIUM CHLORIDE, PRESERVATIVE FREE 10 ML: 5 INJECTION INTRAVENOUS at 21:25

## 2023-02-16 RX ADMIN — FAMOTIDINE 20 MG: 20 TABLET ORAL at 21:25

## 2023-02-16 RX ADMIN — OXYCODONE AND ACETAMINOPHEN 1 TABLET: 10; 325 TABLET ORAL at 07:39

## 2023-02-16 RX ADMIN — AMIODARONE HYDROCHLORIDE 200 MG: 200 TABLET ORAL at 21:24

## 2023-02-16 RX ADMIN — KETOROLAC TROMETHAMINE 15 MG: 15 INJECTION, SOLUTION INTRAMUSCULAR; INTRAVENOUS at 10:17

## 2023-02-16 RX ADMIN — ASPIRIN 81 MG: 81 TABLET, COATED ORAL at 09:01

## 2023-02-16 ASSESSMENT — PAIN DESCRIPTION - DESCRIPTORS
DESCRIPTORS: ACHING
DESCRIPTORS: SORE
DESCRIPTORS: ACHING
DESCRIPTORS: ACHING
DESCRIPTORS: SORE
DESCRIPTORS: ACHING

## 2023-02-16 ASSESSMENT — PAIN - FUNCTIONAL ASSESSMENT
PAIN_FUNCTIONAL_ASSESSMENT: ACTIVITIES ARE NOT PREVENTED
PAIN_FUNCTIONAL_ASSESSMENT: ACTIVITIES ARE NOT PREVENTED
PAIN_FUNCTIONAL_ASSESSMENT: PREVENTS OR INTERFERES SOME ACTIVE ACTIVITIES AND ADLS

## 2023-02-16 ASSESSMENT — PAIN DESCRIPTION - ORIENTATION
ORIENTATION: MID

## 2023-02-16 ASSESSMENT — PAIN SCALES - GENERAL
PAINLEVEL_OUTOF10: 6
PAINLEVEL_OUTOF10: 5
PAINLEVEL_OUTOF10: 6
PAINLEVEL_OUTOF10: 2
PAINLEVEL_OUTOF10: 6
PAINLEVEL_OUTOF10: 7
PAINLEVEL_OUTOF10: 2

## 2023-02-16 ASSESSMENT — PAIN DESCRIPTION - LOCATION
LOCATION: CHEST
LOCATION: CHEST;INCISION
LOCATION: CHEST
LOCATION: INCISION
LOCATION: CHEST
LOCATION: CHEST

## 2023-02-16 NOTE — CARE COORDINATION
This CM met with pt this day to complete assessment. Pt verified his PCP, insurance, emergency contact, and home address. He reports no difficulty obtaining his medications in the community. He lives at home with spouse with a ramped entrance and access to [facility] if needed. He confirms that at baseline prior to admission pt independent with his ADLs including bathing, dressing, cooking, and driving. We discussed discharge planning this day. Pt plans on returning home with spouse when stable for discharge. We reviewed the role and recommendation of home health at discharge - pt is agreeable to referral.  Reviewed agencies - referral to be made to [facility] as they have a contract with the [facility]. Referral to be made this day. No additional CM needs at this time. Will continue to monitor and update as needed. 02/16/23 1145   Service Assessment   Patient Orientation Alert and Oriented   Cognition Alert   History Provided By Patient   Primary Caregiver Self   Support Systems Spouse/Significant Other   PCP Verified by CM Yes   Last Visit to PCP Within last 6 months   Prior Functional Level Independent in ADLs/IADLs   Current Functional Level Other (see comment)  (TBD by clinical team)   Can patient return to prior living arrangement Yes   Ability to make needs known: Good   Family able to assist with home care needs: Yes   Would you like for me to discuss the discharge plan with any other family members/significant others, and if so, who?  Yes   Social/Functional History   Lives With Spouse   Type of [address] [facility] One level   Home Access Ramped entrance   [address] [place] [address] Yes   Mode of Transportation Car   Discharge Planning   Type of [facility] [address] PT;Nursing Services   Patient expects to be discharged to: House   Condition of Participation: Discharge Planning   The Plan for Transition of Care is related to the following treatment goals: Home with family support   The Patient and/or Patient Representative was provided with a Choice of Provider? Patient   The Patient and/Or Patient Representative agree with the Discharge Plan? Yes   Freedom of Choice list was provided with basic dialogue that supports the patient's individualized plan of care/goals, treatment preferences, and shares the quality data associated with the providers?   Yes

## 2023-02-16 NOTE — ANESTHESIA PROCEDURE NOTES
Procedure Performed: BAUTISTA       Start Time:  2/15/2023 8:05 AM       End Time:   2/15/2023 11:00 AM    Preanesthesia Checklist:  Patient identified, IV assessed, risks and benefits discussed, monitors and equipment assessed, procedure being performed at surgeon's request and anesthesia consent obtained. General Procedure Information  Diagnostic Indications for Echo:  assessment of ascending aorta, hemodynamic monitoring and assessment of valve function  Physician Requesting Echo: Johann Hayward MD  Location performed:  OR  Intubated  Bite block placed  Heart visualized  Probe Insertion:  Easy  Probe Type:  Biplane  Modalities:  2D only and color flow mapping    Echocardiographic and Doppler Measurements    Ventricles    Right Ventricle:  Cavity size normal.  Thrombus not present. Global function normal.    Left Ventricle:  Cavity size normal.  Thrombus not present. Global Function normal.  Ejection Fraction 40%. Valves    Aortic Valve: Annulus normal.  Stenosis not present. Regurgitation none. Leaflets calcified. Leaflet motions normal.      Mitral Valve: Annulus normal.  Stenosis not present. Regurgitation mild. Leaflets normal.  Leaflet motions normal.      Tricuspid Valve: Annulus normal.  Stenosis not present. Regurgitation none. Leaflet motions normal.    Pulmonic Valve: Annulus normal.  Stenosis not present. Regurgitation none. Aorta    Ascending Aorta:  Size normal.  Dissection not present. Aortic Arch:  Size normal.  Dissection not present. Descending Aorta:  Size normal.  Dissection not present.             Septa    Atrial Septum:  Intra-atrial septal morphology normal.              Other Findings  Pericardium:  normal      Post Intervention Follow-up Study  Aortic Function: unchangedMitral Function: unchanged1+Tricuspid Function: unchangedNone

## 2023-02-16 NOTE — PROGRESS NOTES
ACUTE PHYSICAL THERAPY GOALS:   (Developed with and agreed upon by patient and/or caregiver. )  LTG:  (1.)Mr. KEANE [facility] SANTANA will move from supine to sit and sit to supine , scoot up and down, and roll side to side in bed with INDEPENDENT within 7 treatment day(s). (2.)Mr. KE[facility] SANTANA will transfer from bed to chair and chair to bed with STAND BY ASSIST using the least restrictive device within 7 treatment day(s). (3.)Mr. KE[facility] SANTANA will ambulate with STAND BY ASSIST for 500+ feet with the least restrictive device within 7 treatment day(s) while maintaining normal vital signs. (4.)Mr. KE[facility] SANTANA will be independent in HEP for B LE strengthening and activity tolerance within 7 treatment days. ________________________________________________________________________________________________       PHYSICAL THERAPY Initial Assessment and AM  (Link to Caseload Tracking: PT Visit Days : 1  Acknowledge Orders  Time In/Out  PT Charge Capture  Rehab Caseload Tracker    Karen Marie is a 75 y.o. male   PRIMARY DIAGNOSIS: S/P CABG x 3  Atherosclerotic heart disease of native coronary artery with unstable angina pectoris (HCC) [I25.110]  Accelerating angina (HCC) [I20.0]  Coronary artery disease (CAD) excluded [Z03.89]  Procedure(s) (LRB):  CABG CORONARY ARTERY BYPASS (CABGX3), LIMA, ENDOSCOPIC GREATER SAPHENOUS VEIN HARVEST LEFT LEG, TRANSEASOPHAGEAL ECHOCARDIOGRAM (N/A)  1 Day Post-Op  Reason for Referral: Generalized Muscle Weakness (M62.81)  Difficulty in walking, Not elsewhere classified (R26.2)  Inpatient: Payor: Ramsey Beltran / Plan: Ramsey Beltran / Product Type: *No Product type* /     ASSESSMENT:     REHAB RECOMMENDATIONS:   Recommendation to date pending progress:  Setting:  Home Health Therapy    Equipment:    To Be Determined     ASSESSMENT:  Mr. [facility] SANTANA presents with decreased bed mobility, transfers, ambulation, balance, activity tolerance, strength and overall general functional mobility s/p CABG x 3 on 2/15/23.  Pt A & O x 3 this date, on 6 L O2 via n.c. Pt reports CG to spouse who is disabled, has son and daughter close by to assist during recovery. Pt in chair on arrival, MIN A transfers, MIN A ambulation with cardiac walker, cues for posture, weight shifting, breathing technique. Pt overall functioning below baseline level of independent, PT to follow for acute care needs.       [address] [facility] [address] [address] [facility] 6 Clicks Basic Mobility Inpatient Short Form  AM-PAC Basic Mobility - Inpatient   How much help is needed turning from your back to your side while in a flat bed without using bedrails?: A Lot  How much help is needed moving from lying on your back to sitting on the side of a flat bed without using bedrails?: A Lot  How much help is needed moving to and from a bed to a chair?: A Little  How much help is needed standing up from a chair using your arms?: A Little  How much help is needed walking in hospital room?: A Little  How much help is needed climbing 3-5 steps with a railing?: A Lot  AM-PAC Inpatient Mobility Raw Score : 15  AM-PAC Inpatient T-Scale Score : 39.45  Mobility Inpatient CMS 0-100% Score: 57.7  Mobility Inpatient CMS G-Code Modifier : CK    SUBJECTIVE:   Mr. Hull Brood states, \"I am doing ok\"     Social/Functional Lives With: Spouse  Type of Home: House  Home Layout: One level  Home Access: Ramped entrance  ADL Assistance: Independent  Ambulation Assistance: Independent  Active : Yes  Mode of Transportation: Car    OBJECTIVE:     PAIN: [place] / O2: PRECAUTION / Oumar Guillen / Jeanne Nagya:   Pre Treatment:    2/10      Post Treatment: 2/10 Vitals    WDL    Oxygen   6 L O2   Chest Tube, Continuous Pulse Oximetry, Walters Catheter, Telemetry , and pacing wires    RESTRICTIONS/PRECAUTIONS:           Sternal Precautions: No Pushing, No Pulling, 5# Lifting Restrictions        GROSS EVALUATION: Intact Impaired (Comments):   AROM [x]     PROM [x]    Strength [x]     Balance []  Fair + static and dynamic standing   Posture [] Rounded Shoulders Sensation [x]     Coordination [x]      Tone [x]     Edema [] B hands   Activity Tolerance []  General fatigue, pain    []      COGNITION/  PERCEPTION: Intact Impaired (Comments):   Orientation [x]     Vision [x]     Hearing [x]     Cognition  [x]       MOBILITY: I Mod I S SBA CGA Min Mod Max Total  NT x2 Comments:   Bed Mobility    Rolling [] [] [] [] [] [] [] [] [] [x] [] In chair   Supine to Sit [] [] [] [] [] [] [] [] [] [x] []    Scooting [] [] [] [] [] [] [] [] [] [x] []    Sit to Supine [] [] [] [] [] [] [] [] [] [x] [] In chair   Transfers    Sit to Stand [] [] [] [] [] [x] [] [] [] [] []    Bed to Chair [] [] [] [] [] [] [] [] [] [x] []    Stand to Sit [] [] [] [] [] [x] [] [] [] [] []     [] [] [] [] [] [] [] [] [] [] []    I=Independent, Mod I=Modified Independent, S=Supervision, SBA=Standby Assistance, CGA=Contact Guard Assistance,   Min=Minimal Assistance, Mod=Moderate Assistance, Max=Maximal Assistance, Total=Total Assistance, NT=Not Tested    GAIT: I Mod I S SBA CGA Min Mod Max Total  NT x2 Comments:   Level of Assistance [] [] [] [] [] [x] [] [] [] [] []    Distance 200  feet    DME Cardiac Walker    Gait Quality Antalgic, Decreased yg , Decreased step clearance, and Decreased step length    Weightbearing Status      Stairs      I=Independent, Mod I=Modified Independent, S=Supervision, SBA=Standby Assistance, CGA=Contact Guard Assistance,   Min=Minimal Assistance, Mod=Moderate Assistance, Max=Maximal Assistance, Total=Total Assistance, NT=Not Tested    PLAN:   FREQUENCY AND DURATION: BID for duration of hospital stay or until stated goals are met, whichever comes first.    THERAPY PROGNOSIS: Good    PROBLEM LIST:   (Skilled intervention is medically necessary to address:)  Decreased ADL/Functional Activities  Decreased Activity Tolerance  Decreased Balance  Decreased Gait Ability  Decreased Strength  Decreased Transfer Abilities INTERVENTIONS PLANNED:   (Benefits and precautions of physical therapy have been discussed with the patient.)  Therapeutic Activity  Therapeutic Exercise/HEP  Neuromuscular Re-education  Gait Training  Education       TREATMENT:   EVALUATION: LOW COMPLEXITY: (Untimed Charge)    TREATMENT:   Therapeutic Activity (10 Minutes): Therapeutic activity included Scooting, Transfer Training, Ambulation on level ground, Sitting balance , Standing balance, and pursed lip breathing, posture, walker safety to improve functional Activity tolerance, Balance, Coordination, and Mobility.     TREATMENT GRID:  N/A    AFTER TREATMENT PRECAUTIONS: Alarm Activated, Bed/Chair Locked, Call light within reach, Chair, Needs within reach, and RN notified    INTERDISCIPLINARY COLLABORATION:  RN/ PCT and PT/ PTA    EDUCATION: Education Given To: Patient  Education Provided: Role of Therapy;Plan of Care  Education Method: Verbal  Barriers to Learning: None  Education Outcome: Verbalized understanding    TIME IN/OUT:  Time In: 0926  Time Out: 9501  Minutes: Oscar 1690, PT

## 2023-02-16 NOTE — ANESTHESIA PROCEDURE NOTES
Central Venous Line:    A central venous line was placed using ultrasound guidance, in the OR for the following indication(s): central venous access and CVP monitoring. 2/15/2023 7:45 AM2/15/2023 7:55 AM    Sterility preparation included the following: hand hygiene performed prior to procedure, maximum sterile barriers used and sterile technique used to drape from head to toe. The patient was placed in Trendelenburg position. The right internal jugular vein was prepped. The site was prepped with Chloraprep. A 9 Fr (size), double lumen was placed. During the procedure, the following specific steps were taken: target vein identified, needle advanced into vein and blood aspirated and guidewire advanced into vein. Intravenous verification was obtained by ultrasound and venous blood return. Post insertion care included: all ports aspirated, all ports flushed easily, guidewire removed intact, line sutured in place and dressing applied. During the procedure the patient experienced: patient tolerated procedure well with no complications. Outcomes: uncomplicated and patient tolerated procedure well  Real-time US image taken/store: yes  Anesthesia type: general.. No    Additional notes:  Potential access site(s) were examined with ultrasound and the acceptable patent access site was selected (site noted above). The needle path and vessel access were visualized in real time using ultrasonography, an image of wire in vessel was recorded for permanent record. \"  Staffing  Performed: Anesthesiologist   Anesthesiologist: Kira Lang MD  Preanesthetic Checklist  Completed: patient identified, IV checked, site marked, risks and benefits discussed, surgical/procedural consents, equipment checked, pre-op evaluation, timeout performed, anesthesia consent given, oxygen available and monitors applied/VS acknowledged

## 2023-02-16 NOTE — PROGRESS NOTES
Today's Date: 2/16/2023  Date of Admission: 2/15/2023    Chart Reviewed. Subjective:     Patient feels ok. He feels sore. He denies any dyspnea or nausea. Medications Reviewed. Objective:     Vitals:    02/16/23 0545 02/16/23 0600 02/16/23 0615 02/16/23 0630   BP: 128/60 126/62 (!) 125/58 122/60   Pulse: 91 92 86 86   Resp: (!) 32 26 (!) 33 (!) 43   Temp:       TempSrc:       SpO2: 100% 97% 99% 100%   Weight:       Height:           Intake and Output  Current Shift: No intake/output data recorded. Last 3 Shifts: 02/14 1901 - 02/16 0700  In: 2450 [I.V.:1700]  Out: 1265 [Urine:995]    Physical Exam:  General: Well Developed, Well Nourished, No Acute Distress, Alert & Oriented x 3, Appropriate mood  Neck: supple, no JVD  Heart: S1S2 with RRR without murmurs or gallops  Lungs: mostly clear throughout auscultation bilaterally  Abd: soft, nontender, nondistended, with good bowel sounds  Ext: no edema bilaterally  Skin: warm and dry    LABS  Data Review:   Recent Labs     02/13/23  0920 02/15/23  1330 02/15/23  1712 02/15/23  2207 02/16/23  0424 02/16/23  0428    143  --   --   --  141   K 4.0 4.0 3.9 4.3  --  4.6   MG 1.9 2.8* 2.1 2.3  --  2.1   BUN 14 19  --   --   --  16   WBC 7.6 7.7 7.8  --  8.9  --    HGB 13.9 10.0* 10.8* 10.0* 9.8*  --    HCT 41.8 30.2* 32.5* 30.0* 29.8*  --     109* 126*  --  129*  --    INR 1.0 1.3  --   --   --   --        Estimated Creatinine Clearance: 67 mL/min (based on SCr of 1 mg/dL).       Assessment/Plan:     Active Hospital Problems    *S/P CABG x 3  On ASA, low dose BB, statin, on O2, small left apical pneumothorax, will transfer to CV stepdown      Encounter for weaning from ventilator ([facility] [address]      Atelectasis  Using IS       Hypoxemia  On O2, will increase to 6 liters for nitrogen washout      Carotid artery stenosis, asymptomatic  Right internal carotid was 70-99%, vascular surgery will see him      Coronary artery disease  S/p CABG, continue medical therapy       Accelerating angina ([facility]        Zaida Brown.  BRENDA Weiss

## 2023-02-16 NOTE — PROCEDURE: MOHS SURGERY
Dressing: telfa dressing Azithromycin Pregnancy And Lactation Text: This medication is considered safe during pregnancy and is also secreted in breast milk.

## 2023-02-16 NOTE — ANESTHESIA POSTPROCEDURE EVALUATION
Department of Anesthesiology  Postprocedure Note    Patient: Katt Orosco  MRN: [id number]  YOB: 1950  Date of evaluation: 2/16/2023      Procedure Summary     Date: 02/15/23 Room / Location: [facility] MAIN OR 05 / [facility] MAIN OR    Anesthesia Start: 0702 Anesthesia Stop: 1860    Procedure: CABG CORONARY ARTERY BYPASS (CABGX3), LIMA, ENDOSCOPIC GREATER SAPHENOUS VEIN HARVEST LEFT LEG, TRANSEASOPHAGEAL ECHOCARDIOGRAM (Chest) Diagnosis:       Atherosclerotic heart disease of native coronary artery with unstable angina pectoris (HCC)      Accelerating angina (HCC)      (Atherosclerotic heart disease of native coronary artery with unstable angina pectoris ([address] [I25.110])      (Accelerating angina ([address] [I20.0])    Providers: Margarito Trejo MD Responsible Provider: Lakesha Gold MD    Anesthesia Type: general ASA Status: 4          Anesthesia Type: No value filed.     Sammy Phase I: Sammy Score: 10    Sammy Phase II:        Anesthesia Post Evaluation    Patient location during evaluation: ICU  Patient participation: complete - patient cannot participate  Level of consciousness: awake and alert  Pain score: 1  Airway patency: patent  Nausea & Vomiting: no vomiting  Complications: no  Cardiovascular status: hemodynamically stable  Respiratory status: acceptable, ventilator and intubated  Hydration status: euvolemic

## 2023-02-16 NOTE — PROGRESS NOTES
ACUTE PHYSICAL THERAPY GOALS:   (Developed with and agreed upon by patient and/or caregiver. )  LTG:  (1.)Mr. Aron Caruso will move from supine to sit and sit to supine , scoot up and down, and roll side to side in bed with INDEPENDENT within 7 treatment day(s). (2.)Mr. Aron Caruso will transfer from bed to chair and chair to bed with STAND BY ASSIST using the least restrictive device within 7 treatment day(s). (3.)Mr. Aron Caruso will ambulate with STAND BY ASSIST for 500+ feet with the least restrictive device within 7 treatment day(s) while maintaining normal vital signs. (4.)Mr. Aron Caruso will be independent in HEP for B LE strengthening and activity tolerance within 7 treatment days. ________________________________________________________________________________________________     PHYSICAL THERAPY: Daily Note PM   (Link to Caseload Tracking: PT Visit Days : 1  Time In/Out PT Charge Capture  Rehab Caseload Tracker  Orders    Jina Chappell is a 75 y.o. male   PRIMARY DIAGNOSIS: S/P CABG x 3  Atherosclerotic heart disease of native coronary artery with unstable angina pectoris ([facility] [address] [I25.110]  Accelerating angina (HCC) [I20.0]  Coronary artery disease (CAD) excluded [Z03.89]  Procedure(s) (LRB):  CABG CORONARY ARTERY BYPASS (CABGX3), LIMA, ENDOSCOPIC GREATER SAPHENOUS VEIN HARVEST LEFT LEG, TRANSEASOPHAGEAL ECHOCARDIOGRAM (N/A)  1 Day Post-Op  Inpatient: Payor: Liberty Jordan / Plan: Liberty Jordan / Product Type: *No Product type* /     ASSESSMENT:     REHAB RECOMMENDATIONS:   Recommendation to date pending progress:  Setting:  Home Health Therapy    Equipment:    To Be Determined     ASSESSMENT:  Mr. Aron Caruso is sitting in chair on arrival, on step down unit. Pt still on 6 L O2 for washout, stats >90%. Pt continues to require [address]  transfers, ambulation with RW due to feeling slightly unsteady and groggy for ~200' in hallway, CGA. Pt with slight forward lean, cues for posture and breathing technique.  Pt returned to room, performed seated there ex with verbal and tactile cues, tends to perform tasks quickly, cues for pacing. PT to cont to follow for acute care needs.       SUBJECTIVE:   Mr. Fabian Stern states, \"I am starting to hurt\"     Social/Functional Lives With: Spouse  Type of Home: House  Home Layout: One level  Home Access: Ramped entrance  ADL Assistance: Independent  Ambulation Assistance: Independent  Active : Yes  Mode of Transportation: Car  OBJECTIVE:     PAIN: [place] / O2: Yessenia Drown / Alyx Tomer / [employer]ia Power:   Pre Treatment:    5/10      Post Treatment: 7/10-RN aware Vitals    WDL    Oxygen   6 L O2 Telemetry     RESTRICTIONS/PRECAUTIONS:  Position Activity Restriction  Sternal Precautions: No Pushing, No Pulling, 5# Lifting Restrictions     MOBILITY: I Mod I S SBA CGA Min Mod Max Total  NT x2 Comments:   Bed Mobility    Rolling [] [] [] [] [] [] [] [] [] [x] [] In chair   Supine to Sit [] [] [] [] [] [] [] [] [] [x] []    Scooting [] [] [] [] [] [] [] [] [] [x] []    Sit to Supine [] [] [] [] [] [] [] [] [] [x] []    Transfers    Sit to Stand [] [] [] [] [] [x] [] [] [] [] []    Bed to Chair [] [] [] [] [] [] [] [] [] [x] []    Stand to Sit [] [] [] [] [] [x] [] [] [] [] []     [] [] [] [] [] [] [] [] [] [] []    I=Independent, Mod I=Modified Independent, S=Supervision, SBA=Standby Assistance, CGA=Contact Guard Assistance,   Min=Minimal Assistance, Mod=Moderate Assistance, Max=Maximal Assistance, Total=Total Assistance, NT=Not Tested    BALANCE: Good Fair+ Fair Fair- Poor NT Comments   Sitting Static [x] [] [] [] [] []    Sitting Dynamic [] [x] [] [] [] []              Standing Static [] [x] [x] [] [] []    Standing Dynamic [] [] [x] [] [] []      GAIT: I Mod I S SBA CGA Min Mod Max Total  NT x2 Comments:   Level of Assistance [] [] [] [] [x] [] [] [] [] [] []    Distance 200  feet    DME Rolling Walker    Gait Quality Antalgic, Decreased yg , Decreased step clearance, and Decreased step length    Weightbearing Status Stairs      I=Independent, Mod I=Modified Independent, S=Supervision, SBA=Standby Assistance, CGA=Contact Guard Assistance,   Min=Minimal Assistance, Mod=Moderate Assistance, Max=Maximal Assistance, Total=Total Assistance, NT=Not Tested    PLAN:   FREQUENCY AND DURATION: BID for duration of hospital stay or until stated goals are met, whichever comes first.    TREATMENT:   TREATMENT:   Therapeutic Activity (29 Minutes): Therapeutic activity included Scooting, Transfer Training, Ambulation on level ground, Sitting balance , Standing balance, and walker safety, posture, there ex in sitting to improve functional Activity tolerance, Balance, Coordination, Mobility, Strength, and ROM.     TREATMENT GRID:  N/A    AFTER TREATMENT PRECAUTIONS: Bed/Chair Locked, Call light within reach, Chair, Needs within reach, RN notified, and Visitors at bedside    INTERDISCIPLINARY COLLABORATION:  RN/ PCT and PT/ PTA    EDUCATION: Education Given To: Patient  Education Provided: Transfer Training  Education Method: Verbal  Barriers to Learning: None  Education Outcome: Continued education needed    TIME IN/OUT:  Time In: 1315  Time Out: [address]  Minutes: SANTOSH Smith

## 2023-02-16 NOTE — PROGRESS NOTES
Pulmonary CV progress Note: 2/16/2023        Cody Valencia                                                     Admission Date: 2/15/2023     The patient's chart is reviewed and the patient is discussed with the staff. Background: 75 y.o. y/o male  has a past medical history of Cancer ([facility] [address] Chronic pain, Coronary artery disease, Dyslipidemia, Enlarged prostate, Environmental allergies, Essential hypertension, and Type 2 diabetes mellitus ([facility] S/P CABG x 3 1 Day Post-Op    Subjective:   Doing really well today. Has a small L pneumo and is on N2 washout today. Objective:   Blood pressure (!) 116/56, pulse 92, temperature 100.2 °F (37.9 °C), temperature source Bladder, resp. rate 26, height 5' 10\" (1.778 m), weight 170 lb 6.7 oz (77.3 kg), SpO2 100 %. Intake/Output Summary (Last 24 hours) at 2/16/2023 1027  Last data filed at 2/16/2023 0600  Gross per 24 hour   Intake 2150 ml   Output 1115 ml   Net 1035 ml     Physical Exam:          Constitutional:  Awake, calm  EENMT:  Sclera clear, pupils equal  Respiratory: CTAB  Cardiovascular:  RRR with no M,G,R;  Gastrointestinal:  soft; normal bowel sounds present  Musculoskeletal:  warm with no cyanosis, no lower extremity edema. SKIN:  no jaundice or ecchymosis   Neurologic:  moving all extremities, fluent speech  Psychiatric:  calm    CXR:            DRIPS:   Dose (mcg/kg/hr) Dexmedetomidine: 0 mcg/kg/hr (Anesthesia infusion automatically stopped by extension.)  Dose (mcg/min) Epinephrine: 0 mcg/min  Dose (mcg/kg/min) Phenylephrine : *0 mcg/min  Dose (mg/hr) Nicardipine: *0.2 mg  Dose (units/hr) Insulin : 0 Units/hr     CI:  CO (l/min): 7 l/min    Ventilator Settings  Mode FIO2 Rate Tidal Volume Pressure   CPAP/PS    40 %  18 bmp     500 mL   8     Peak airway pressure:     Minute ventilation:    No results for input(s): PH, PCO2, PO2, HCO3 in the last 72 hours.    Recent Labs     02/15/23  1330 02/15/23  1712 02/15/23  2207 02/16/23  0424   WBC 7.7 7.8  --  8.9   HGB 10.0* 10.8* 10.0* 9.8*   HCT 30.2* 32.5* 30.0* 29.8*   * 126*  --  129*   INR 1.3  --   --   --      Recent Labs     02/15/23  1330 02/15/23  1712 02/15/23  2207 02/16/23  0428     --   --  141   K 4.0 3.9 4.3 4.6   *  --   --  114*   CO2 23  --   --  19*   BUN 19  --   --  16   CREATININE 1.20  --   --  1.00   MG 2.8* 2.1 2.3 2.1     No results for input(s): PROCAL, LACTATE in the last 72 hours. 02/09/23    TRANSTHORACIC ECHOCARDIOGRAM (TTE) COMPLETE (CONTRAST/BUBBLE/3D PRN) 02/09/2023  1:02 PM, 02/09/2023 12:00 AM (Final)    Interpretation Summary    Left Ventricle: Moderately reduced left ventricular systolic function with a visually estimated EF of 40 - 45%. Left ventricle is mildly dilated. Mildly increased wall thickness. Moderate global hypokinesis present. Abnormal diastolic function. Aortic Valve: Valve structure is normal. Mild sclerosis of the aortic valve cusp. Mitral Valve: Mild regurgitation. Left Atrium: Left atrium is moderately dilated. Right Atrium: Right atrium is mildly dilated. Technical qualifiers: Color flow Doppler was performed and pulse wave and/or continuous wave Doppler was performed. Contrast used: Definity. Signed by: Aamir Singletary MD on 2/9/2023  1:02 PM, Signed by: Unknown Provider Result on 2/9/2023 12:00 AM    Assessment and Plan :  (Medical Decision Making)   Impression: 75 y.o. y/o male s/p CV surgery for S/P CABG x 3; 1 Day [place] Problems    Pneumothorax on left  Continue nitrogen washout and repeat chest x-ray tomorrow    *S/P CABG x 3      Atelectasis  Encourage incentive spirometry    Hypoxemia  . Maintain 6 L/min of supplemental oxygen.     Carotid artery stenosis, asymptomatic      Coronary artery disease      Accelerating angina ([address]      More than 50% of the time documented was spent in face-to-face contact with the patient and in the care of the patient on the floor/unit where the patient is located.      Romayne Gaines, MD

## 2023-02-16 NOTE — PLAN OF CARE
Problem: Chronic Conditions and Co-morbidities  Goal: Patient's chronic conditions and co-morbidity symptoms are monitored and maintained or improved  Outcome: Progressing  Flowsheets (Taken 2/15/2023 1945)  Care Plan - Patient's Chronic Conditions and Co-Morbidity Symptoms are Monitored and Maintained or Improved: Collaborate with multidisciplinary team to address chronic and comorbid conditions and prevent exacerbation or deterioration     Problem: Pain  Goal: Verbalizes/displays adequate comfort level or baseline comfort level  Outcome: Progressing  Flowsheets (Taken 2/15/2023 1945)  Verbalizes/displays adequate comfort level or baseline comfort level: Encourage patient to monitor pain and request assistance     Problem: Discharge Planning  Goal: Discharge to home or other facility with appropriate resources  Outcome: Progressing  Flowsheets (Taken 2/15/2023 1945)  Discharge to home or other facility with appropriate resources:   Arrange for needed discharge resources and transportation as appropriate   Identify barriers to discharge with patient and caregiver     Problem: Skin/Tissue Integrity  Goal: Absence of new skin breakdown  Description: 1. Monitor for areas of redness and/or skin breakdown  2. Assess vascular access sites hourly  3. Every 4-6 hours minimum:  Change oxygen saturation probe site  4. Every 4-6 hours:  If on nasal continuous positive airway pressure, respiratory therapy assess nares and determine need for appliance change or resting period.   Outcome: Progressing     Problem: Safety - Adult  Goal: Free from fall injury  Outcome: Progressing  Flowsheets (Taken 2/15/2023 1945)  Free From Fall Injury:   Based on caregiver fall risk screen, instruct family/caregiver to ask for assistance with transferring infant if caregiver noted to have fall risk factors   Instruct family/caregiver on patient safety     Problem: Neurosensory - Adult  Goal: Achieves stable or improved neurological status  Outcome: Progressing  Flowsheets (Taken 2/15/2023 1945)  Achieves stable or improved neurological status: Assess for and report changes in neurological status     Problem: Respiratory - Adult  Goal: Achieves optimal ventilation and oxygenation  Outcome: Progressing  Flowsheets (Taken 2/15/2023 1945)  Achieves optimal ventilation and oxygenation:   Assess for changes in respiratory status   Assess for changes in mentation and behavior     Problem: Cardiovascular - Adult  Goal: Maintains optimal cardiac output and hemodynamic stability  Outcome: Progressing  Flowsheets (Taken 2/15/2023 1945)  Maintains optimal cardiac output and hemodynamic stability:   Monitor blood pressure and heart rate   Monitor urine output and notify Licensed Independent Practitioner for values outside of normal range  Goal: Absence of cardiac dysrhythmias or at baseline  Outcome: Progressing  Flowsheets (Taken 2/15/2023 1945)  Absence of cardiac dysrhythmias or at baseline:   Monitor cardiac rate and rhythm   Assess for signs of decreased cardiac output     Problem: Skin/Tissue Integrity - Adult  Goal: Skin integrity remains intact  Outcome: Progressing  Flowsheets  Taken 2/15/2023 1945 by Norma Pfeiffer RN  Skin Integrity Remains Intact: Monitor for areas of redness and/or skin breakdown  Taken 2/15/2023 1250 by Kristin Torres RN  Skin Integrity Remains Intact: Monitor for areas of redness and/or skin breakdown  Goal: Incisions, wounds, or drain sites healing without S/S of infection  Outcome: Progressing  Flowsheets (Taken 2/15/2023 1945)  Incisions, Wounds, or Drain Sites Healing Without Sign and Symptoms of Infection:   Implement wound care per orders   Initiate isolation precautions as appropriate  Goal: Oral mucous membranes remain intact  Outcome: Progressing  Flowsheets (Taken 2/15/2023 1945)  Oral Mucous Membranes Remain Intact: Assess oral mucosa and hygiene practices     Problem: Musculoskeletal - Adult  Goal: Return mobility to safest level of function  Outcome: Progressing  Flowsheets (Taken 2/15/2023 1945)  Return Mobility to Safest Level of Function:   Assess patient stability and activity tolerance for standing, transferring and ambulating with or without assistive devices   Assist with transfers and ambulation using safe patient handling equipment as needed     Problem: Gastrointestinal - Adult  Goal: Minimal or absence of nausea and vomiting  Outcome: Progressing  Flowsheets (Taken 2/15/2023 1945)  Minimal or absence of nausea and vomiting: Administer IV fluids as ordered to ensure adequate hydration     Problem: Genitourinary - Adult  Goal: Absence of urinary retention  Outcome: Progressing  Flowsheets (Taken 2/15/2023 1945)  Absence of urinary retention:   Assess patients ability to void and empty bladder   Monitor intake/output and perform bladder scan as needed  Goal: Urinary catheter remains patent  Outcome: Progressing  Flowsheets (Taken 2/15/2023 1945)  Urinary catheter remains patent: Assess patency of urinary catheter     Problem: Infection - Adult  Goal: Absence of infection at discharge  Outcome: Progressing  Flowsheets (Taken 2/15/2023 1945)  Absence of infection at discharge:   Assess and monitor for signs and symptoms of infection   Monitor lab/diagnostic results  Goal: Absence of infection during hospitalization  Outcome: Progressing  Flowsheets (Taken 2/15/2023 1945)  Absence of infection during hospitalization:   Assess and monitor for signs and symptoms of infection   Monitor lab/diagnostic results  Goal: Absence of fever/infection during anticipated neutropenic period  Outcome: Progressing  Flowsheets (Taken 2/15/2023 1945)  Absence of fever/infection during anticipated neutropenic period:   Monitor white blood cell count   Administer growth factors as ordered     Problem: Metabolic/Fluid and Electrolytes - Adult  Goal: Electrolytes maintained within normal limits  Outcome: Progressing  Flowsheets (Taken 2/15/2023 1945)  Electrolytes maintained within normal limits:   Monitor labs and assess patient for signs and symptoms of electrolyte imbalances   Administer electrolyte replacement as ordered  Goal: Hemodynamic stability and optimal renal function maintained  Outcome: Progressing  Flowsheets (Taken 2/15/2023 1945)  Hemodynamic stability and optimal renal function maintained:   Monitor labs and assess for signs and symptoms of volume excess or deficit   Monitor intake, output and patient weight  Goal: Glucose maintained within prescribed range  Outcome: Progressing  Flowsheets (Taken 2/15/2023 1945)  Glucose maintained within prescribed range:   Monitor blood glucose as ordered   Assess for signs and symptoms of hyperglycemia and hypoglycemia     Problem: Hematologic - Adult  Goal: Maintains hematologic stability  Outcome: Progressing  Flowsheets (Taken 2/15/2023 1945)  Maintains hematologic stability:   Assess for signs and symptoms of bleeding or hemorrhage   Monitor labs for bleeding or clotting disorders

## 2023-02-16 NOTE — CONSULTS
Patient seen and examined. Patient status post open heart surgery. Was found to have asymptomatic right carotid stenosis greater 70%. He denies any history of stroke. Prior to discharge when primary team got a CTA of the neck and he can follow my office in about a month to discuss potential intervention.     Miguel Mcgowan

## 2023-02-17 ENCOUNTER — APPOINTMENT (OUTPATIENT)
Dept: CT IMAGING | Age: 73
DRG: 236 | End: 2023-02-17
Attending: THORACIC SURGERY (CARDIOTHORACIC VASCULAR SURGERY)
Payer: OTHER GOVERNMENT

## 2023-02-17 ENCOUNTER — APPOINTMENT (OUTPATIENT)
Dept: GENERAL RADIOLOGY | Age: 73
DRG: 236 | End: 2023-02-17
Attending: THORACIC SURGERY (CARDIOTHORACIC VASCULAR SURGERY)
Payer: OTHER GOVERNMENT

## 2023-02-17 LAB
ABO + RH BLD: NORMAL
ANION GAP SERPL CALC-SCNC: 6 MMOL/L (ref 2–11)
BLD PROD TYP BPU: NORMAL
BLD PROD TYP BPU: NORMAL
BLOOD BANK DISPENSE STATUS: NORMAL
BLOOD BANK DISPENSE STATUS: NORMAL
BLOOD GROUP ANTIBODIES SERPL: NORMAL
BPU ID: NORMAL
BPU ID: NORMAL
BUN SERPL-MCNC: 19 MG/DL (ref 8–23)
CALCIUM SERPL-MCNC: 9.3 MG/DL (ref 8.3–10.4)
CHLORIDE SERPL-SCNC: 102 MMOL/L (ref 101–110)
CO2 SERPL-SCNC: 26 MMOL/L (ref 21–32)
CREAT SERPL-MCNC: 1.2 MG/DL (ref 0.8–1.5)
CROSSMATCH RESULT: NORMAL
CROSSMATCH RESULT: NORMAL
ERYTHROCYTE [DISTWIDTH] IN BLOOD BY AUTOMATED COUNT: 13.8 % (ref 11.9–14.6)
GLUCOSE BLD STRIP.AUTO-MCNC: 210 MG/DL (ref 65–100)
GLUCOSE BLD STRIP.AUTO-MCNC: 216 MG/DL (ref 65–100)
GLUCOSE BLD STRIP.AUTO-MCNC: 230 MG/DL (ref 65–100)
GLUCOSE BLD STRIP.AUTO-MCNC: 330 MG/DL (ref 65–100)
GLUCOSE SERPL-MCNC: 312 MG/DL (ref 65–100)
HCT VFR BLD AUTO: 36.8 % (ref 41.1–50.3)
HGB BLD-MCNC: 12.1 G/DL (ref 13.6–17.2)
MAGNESIUM SERPL-MCNC: 2.2 MG/DL (ref 1.8–2.4)
MCH RBC QN AUTO: 29.4 PG (ref 26.1–32.9)
MCHC RBC AUTO-ENTMCNC: 32.9 G/DL (ref 31.4–35)
MCV RBC AUTO: 89.3 FL (ref 82–102)
NRBC # BLD: 0 K/UL (ref 0–0.2)
PLATELET # BLD AUTO: 178 K/UL (ref 150–450)
PMV BLD AUTO: 10.6 FL (ref 9.4–12.3)
POTASSIUM SERPL-SCNC: 4.6 MMOL/L (ref 3.5–5.1)
RBC # BLD AUTO: 4.12 M/UL (ref 4.23–5.6)
SERVICE CMNT-IMP: ABNORMAL
SODIUM SERPL-SCNC: 134 MMOL/L (ref 133–143)
SPECIMEN EXP DATE BLD: NORMAL
UNIT DIVISION: 0
UNIT DIVISION: 0
WBC # BLD AUTO: 11.8 K/UL (ref 4.3–11.1)

## 2023-02-17 PROCEDURE — 99232 SBSQ HOSP IP/OBS MODERATE 35: CPT | Performed by: INTERNAL MEDICINE

## 2023-02-17 PROCEDURE — 82962 GLUCOSE BLOOD TEST: CPT

## 2023-02-17 PROCEDURE — 97530 THERAPEUTIC ACTIVITIES: CPT

## 2023-02-17 PROCEDURE — 2140000001 HC CVICU INTERMEDIATE R&B

## 2023-02-17 PROCEDURE — 97110 THERAPEUTIC EXERCISES: CPT

## 2023-02-17 PROCEDURE — 85027 COMPLETE CBC AUTOMATED: CPT

## 2023-02-17 PROCEDURE — 83735 ASSAY OF MAGNESIUM: CPT

## 2023-02-17 PROCEDURE — 80048 BASIC METABOLIC PNL TOTAL CA: CPT

## 2023-02-17 PROCEDURE — 71046 X-RAY EXAM CHEST 2 VIEWS: CPT

## 2023-02-17 PROCEDURE — 6370000000 HC RX 637 (ALT 250 FOR IP): Performed by: THORACIC SURGERY (CARDIOTHORACIC VASCULAR SURGERY)

## 2023-02-17 PROCEDURE — 6360000004 HC RX CONTRAST MEDICATION: Performed by: PHYSICIAN ASSISTANT

## 2023-02-17 PROCEDURE — 2580000003 HC RX 258: Performed by: PHYSICIAN ASSISTANT

## 2023-02-17 PROCEDURE — 6370000000 HC RX 637 (ALT 250 FOR IP): Performed by: PHYSICIAN ASSISTANT

## 2023-02-17 PROCEDURE — 36415 COLL VENOUS BLD VENIPUNCTURE: CPT

## 2023-02-17 PROCEDURE — 70498 CT ANGIOGRAPHY NECK: CPT

## 2023-02-17 RX ORDER — INSULIN GLARGINE 100 [IU]/ML
10 INJECTION, SOLUTION SUBCUTANEOUS NIGHTLY
Status: DISCONTINUED | OUTPATIENT
Start: 2023-02-17 | End: 2023-02-19

## 2023-02-17 RX ORDER — 0.9 % SODIUM CHLORIDE 0.9 %
100 INTRAVENOUS SOLUTION INTRAVENOUS ONCE
Status: COMPLETED | OUTPATIENT
Start: 2023-02-17 | End: 2023-02-17

## 2023-02-17 RX ORDER — SODIUM CHLORIDE 0.9 % (FLUSH) 0.9 %
10 SYRINGE (ML) INJECTION
Status: COMPLETED | OUTPATIENT
Start: 2023-02-17 | End: 2023-02-17

## 2023-02-17 RX ADMIN — FAMOTIDINE 20 MG: 20 TABLET ORAL at 09:14

## 2023-02-17 RX ADMIN — CARVEDILOL 3.12 MG: 3.12 TABLET, FILM COATED ORAL at 21:49

## 2023-02-17 RX ADMIN — FINASTERIDE 5 MG: 5 TABLET, FILM COATED ORAL at 09:13

## 2023-02-17 RX ADMIN — Medication 1 AMPULE: at 21:51

## 2023-02-17 RX ADMIN — Medication 1 AMPULE: at 09:08

## 2023-02-17 RX ADMIN — SODIUM CHLORIDE, PRESERVATIVE FREE 10 ML: 5 INJECTION INTRAVENOUS at 09:15

## 2023-02-17 RX ADMIN — INSULIN LISPRO 4 UNITS: 100 INJECTION, SOLUTION INTRAVENOUS; SUBCUTANEOUS at 18:00

## 2023-02-17 RX ADMIN — SODIUM CHLORIDE 100 ML: 9 INJECTION, SOLUTION INTRAVENOUS at 14:34

## 2023-02-17 RX ADMIN — SODIUM CHLORIDE, PRESERVATIVE FREE 10 ML: 5 INJECTION INTRAVENOUS at 21:58

## 2023-02-17 RX ADMIN — CARVEDILOL 3.12 MG: 3.12 TABLET, FILM COATED ORAL at 09:14

## 2023-02-17 RX ADMIN — ONDANSETRON 4 MG: 4 TABLET, ORALLY DISINTEGRATING ORAL at 14:01

## 2023-02-17 RX ADMIN — INSULIN LISPRO 8 UNITS: 100 INJECTION, SOLUTION INTRAVENOUS; SUBCUTANEOUS at 13:14

## 2023-02-17 RX ADMIN — OXYCODONE AND ACETAMINOPHEN 1 TABLET: 5; 325 TABLET ORAL at 04:25

## 2023-02-17 RX ADMIN — INSULIN LISPRO 2 UNITS: 100 INJECTION, SOLUTION INTRAVENOUS; SUBCUTANEOUS at 21:50

## 2023-02-17 RX ADMIN — OXYCODONE AND ACETAMINOPHEN 1 TABLET: 5; 325 TABLET ORAL at 21:49

## 2023-02-17 RX ADMIN — SODIUM CHLORIDE, PRESERVATIVE FREE 10 ML: 5 INJECTION INTRAVENOUS at 14:34

## 2023-02-17 RX ADMIN — INSULIN GLARGINE 10 UNITS: 100 INJECTION, SOLUTION SUBCUTANEOUS at 21:50

## 2023-02-17 RX ADMIN — SENNOSIDES AND DOCUSATE SODIUM 2 TABLET: 8.6; 5 TABLET ORAL at 09:13

## 2023-02-17 RX ADMIN — SENNOSIDES AND DOCUSATE SODIUM 2 TABLET: 8.6; 5 TABLET ORAL at 21:49

## 2023-02-17 RX ADMIN — ASPIRIN 81 MG: 81 TABLET, COATED ORAL at 09:13

## 2023-02-17 RX ADMIN — INSULIN LISPRO 4 UNITS: 100 INJECTION, SOLUTION INTRAVENOUS; SUBCUTANEOUS at 09:12

## 2023-02-17 RX ADMIN — FUROSEMIDE 40 MG: 40 TABLET ORAL at 09:15

## 2023-02-17 RX ADMIN — FAMOTIDINE 20 MG: 20 TABLET ORAL at 21:51

## 2023-02-17 RX ADMIN — AMIODARONE HYDROCHLORIDE 200 MG: 200 TABLET ORAL at 21:49

## 2023-02-17 RX ADMIN — AMIODARONE HYDROCHLORIDE 200 MG: 200 TABLET ORAL at 09:14

## 2023-02-17 RX ADMIN — ROSUVASTATIN CALCIUM 40 MG: 20 TABLET, COATED ORAL at 21:57

## 2023-02-17 RX ADMIN — IOHEXOL 50 ML: 350 INJECTION, SOLUTION INTRAVENOUS at 14:33

## 2023-02-17 RX ADMIN — POTASSIUM CHLORIDE 10 MEQ: 750 TABLET, EXTENDED RELEASE ORAL at 09:13

## 2023-02-17 ASSESSMENT — PAIN SCALES - GENERAL
PAINLEVEL_OUTOF10: 0
PAINLEVEL_OUTOF10: 0
PAINLEVEL_OUTOF10: 5
PAINLEVEL_OUTOF10: 7
PAINLEVEL_OUTOF10: 0
PAINLEVEL_OUTOF10: 7

## 2023-02-17 ASSESSMENT — PAIN DESCRIPTION - LOCATION
LOCATION: INCISION
LOCATION: CHEST

## 2023-02-17 ASSESSMENT — PAIN DESCRIPTION - ORIENTATION
ORIENTATION: MID
ORIENTATION: MID

## 2023-02-17 ASSESSMENT — PAIN - FUNCTIONAL ASSESSMENT: PAIN_FUNCTIONAL_ASSESSMENT: ACTIVITIES ARE NOT PREVENTED

## 2023-02-17 ASSESSMENT — PAIN DESCRIPTION - DESCRIPTORS
DESCRIPTORS: SORE
DESCRIPTORS: ACHING

## 2023-02-17 NOTE — PROGRESS NOTES
Today's Date: 2/17/2023  Date of Admission: 2/15/2023    Chart Reviewed. Subjective:     Patient feels sore but ok. He denies any dyspnea. Medications Reviewed. Objective:     Vitals:    02/17/23 0455 02/17/23 0553 02/17/23 0743 02/17/23 0920   BP:   (!) 143/73 (!) 137/97   Pulse:   88 87   Resp: 18  18    Temp:   97.7 °F (36.5 °C)    TempSrc:   Oral    SpO2:   100% 95%   Weight:  168 lb (76.2 kg)     Height:           Intake and Output  Current Shift: No intake/output data recorded. Last 3 Shifts: 02/15 1901 - 02/17 0700  In: 540 [P.O.:540]  Out: 1115 [Urine:825]    Physical Exam:  General: Well Developed, Well Nourished, No Acute Distress, Alert & Oriented x 3, Appropriate mood  Neck: supple, no JVD  Heart: S1S2 with RRR without murmurs or gallops  Lungs: Clear throughout auscultation bilaterally without adventitious sounds  Abd: soft, nontender, nondistended, with good bowel sounds  Ext: no edema bilaterally  Sternal incision: clean, dry, and intact  Skin: warm and dry    LABS  Data Review:   Recent Labs     02/15/23  1330 02/15/23  1712 02/15/23  2207 02/16/23  0424 02/16/23  0428     --   --   --  141   K 4.0 3.9 4.3  --  4.6   MG 2.8* 2.1 2.3  --  2.1   BUN 19  --   --   --  16   WBC 7.7 7.8  --  8.9  --    HGB 10.0* 10.8* 10.0* 9.8*  --    HCT 30.2* 32.5* 30.0* 29.8*  --    * 126*  --  129*  --    INR 1.3  --   --   --   --        Estimated Creatinine Clearance: 67 mL/min (based on SCr of 1 mg/dL).       Assessment/Plan:     Active Hospital Problems    *S/P CABG x 3  On ASA, low dose BB, statin, pneumothorax resolved, stable on room air, pacing wires removed, continue PT        Encounter for weaning from ventilator ([address]       Atelectasis  Using IS        Hypoxemia  Stable on room air        Carotid artery stenosis, asymptomatic  Right internal carotid was 70-99%, will get CT today, will follow up with vascular surgery on outpatient basis        Coronary artery disease  S/p CABG, continue medical therapy        Accelerating angina ([facility] [facility]      Diabetes Mellitus  Continue SSI, resume [address].  BRENDA Weiss

## 2023-02-17 NOTE — PROGRESS NOTES
Pulmonary CV progress Note: 2/17/2023        Ga Fernandez                                                     Admission Date: 2/15/2023     The patient's chart is reviewed and the patient is discussed with the staff. Background: 75 y.o. y/o male  has a past medical history of Cancer ([facility] [address] Chronic pain, Coronary artery disease, Dyslipidemia, Enlarged prostate, Environmental allergies, Essential hypertension, and Type 2 diabetes mellitus ([facility] [address] S/P CABG x 3 2 Days Post-Op    Subjective:   Doing really well today. Has a small L pneumo and is on N2 washout today. Objective:   Blood pressure (!) 137/97, pulse 87, temperature 97.7 °F (36.5 °C), temperature source Oral, resp. rate 18, height 5' 10\" (1.778 m), weight 168 lb (76.2 kg), SpO2 95 %. Intake/Output Summary (Last 24 hours) at 2/17/2023 1021  Last data filed at 2/16/2023 1818  Gross per 24 hour   Intake 540 ml   Output 210 ml   Net 330 ml       Physical Exam:          Constitutional:  Awake, calm  EENMT:  Sclera clear, pupils equal  Respiratory: CTAB  Cardiovascular:  RRR with no M,G,R;  Gastrointestinal:  soft; normal bowel sounds present  Musculoskeletal:  warm with no cyanosis, no lower extremity edema. SKIN:  no jaundice or ecchymosis   Neurologic:  moving all extremities, fluent speech  Psychiatric:  calm    CXR:        DRIPS:   Dose (mcg/kg/hr) Dexmedetomidine: 0 mcg/kg/hr (Anesthesia infusion automatically stopped by extension.)  Dose (mcg/min) Epinephrine: 0 mcg/min  Dose (mcg/kg/min) Phenylephrine : *0 mcg/min  Dose (mg/hr) Nicardipine: *0.2 mg  Dose (units/hr) Insulin : 0 Units/hr     CI:  CO (l/min): 7 l/min    Ventilator Settings  Mode FIO2 Rate Tidal Volume Pressure   CPAP/PS    40 %  18 bmp     500 mL   8     Peak airway pressure:     Minute ventilation:    No results for input(s): PH, PCO2, PO2, HCO3 in the last 72 hours.    Recent Labs     02/15/23  1330 02/15/23  1712 02/15/23  2207 02/16/23  0424   WBC 7.7 7.8  --  8.9 HGB 10.0* 10.8* 10.0* 9.8*   HCT 30.2* 32.5* 30.0* 29.8*   * 126*  --  129*   INR 1.3  --   --   --        Recent Labs     02/15/23  1330 02/15/23  1712 02/15/23  2207 02/16/23  0428     --   --  141   K 4.0 3.9 4.3 4.6   *  --   --  114*   CO2 23  --   --  19*   BUN 19  --   --  16   CREATININE 1.20  --   --  1.00   MG 2.8* 2.1 2.3 2.1       No results for input(s): PROCAL, LACTATE in the last 72 hours. 02/09/23    TRANSTHORACIC ECHOCARDIOGRAM (TTE) COMPLETE (CONTRAST/BUBBLE/3D PRN) 02/09/2023  1:02 PM, 02/09/2023 12:00 AM (Final)    Interpretation Summary    Left Ventricle: Moderately reduced left ventricular systolic function with a visually estimated EF of 40 - 45%. Left ventricle is mildly dilated. Mildly increased wall thickness. Moderate global hypokinesis present. Abnormal diastolic function. Aortic Valve: Valve structure is normal. Mild sclerosis of the aortic valve cusp. Mitral Valve: Mild regurgitation. Left Atrium: Left atrium is moderately dilated. Right Atrium: Right atrium is mildly dilated. Technical qualifiers: Color flow Doppler was performed and pulse wave and/or continuous wave Doppler was performed. Contrast used: Definity. Signed by: Buddy Spivey MD on 2/9/2023  1:02 PM, Signed by: Unknown Provider Result on 2/9/2023 12:00 AM    Assessment and Plan :  (Medical Decision Making)   Impression: 75 y.o. y/o male s/p CV surgery for S/P CABG x 3; 2 Days [place] Problems    Pneumothorax on left  resolbed    *S/P CABG x 3      Atelectasis  Encourage incentive spirometry    Hypoxemia  Also resolved. Carotid artery stenosis, asymptomatic      Coronary artery disease      Accelerating angina [facility])    [facility] will be available as needed. Let us know if we can assist in any way. I wouldn't be surprised if there is still a tiny pneumo on the CT but I wouldn't recommend any intervention unless it is moderate or large.     More than 50% of the time documented was spent in face-to-face contact with the patient and in the care of the patient on the floor/unit where the patient is located.     Christy Villa MD

## 2023-02-17 NOTE — PROGRESS NOTES
PM note:  Treatment deferred as the patient states that he is too cold right now and now the patient is off the floor at CT. Will continue PT efforts.   Meredith Jones, PTA

## 2023-02-17 NOTE — PLAN OF CARE
Problem: Chronic Conditions and Co-morbidities  Goal: Patient's chronic conditions and co-morbidity symptoms are monitored and maintained or improved  Outcome: Progressing     Problem: Pain  Goal: Verbalizes/displays adequate comfort level or baseline comfort level  Outcome: Progressing  Flowsheets (Taken 2/17/2023 0743)  Verbalizes/displays adequate comfort level or baseline comfort level:   Encourage patient to monitor pain and request assistance   Assess pain using appropriate pain scale     Problem: Discharge Planning  Goal: Discharge to home or other facility with appropriate resources  Outcome: Progressing     Problem: Skin/Tissue Integrity  Goal: Absence of new skin breakdown  Description: 1. Monitor for areas of redness and/or skin breakdown  2. Assess vascular access sites hourly  3. Every 4-6 hours minimum:  Change oxygen saturation probe site  4. Every 4-6 hours:  If on nasal continuous positive airway pressure, respiratory therapy assess nares and determine need for appliance change or resting period.   Outcome: Progressing     Problem: Safety - Adult  Goal: Free from fall injury  Outcome: Progressing     Problem: Neurosensory - Adult  Goal: Achieves stable or improved neurological status  Outcome: Progressing  Flowsheets (Taken 2/17/2023 0743)  Achieves stable or improved neurological status: Assess for and report changes in neurological status     Problem: Respiratory - Adult  Goal: Achieves optimal ventilation and oxygenation  Outcome: Progressing  Flowsheets (Taken 2/17/2023 0743)  Achieves optimal ventilation and oxygenation: Assess for changes in respiratory status     Problem: Cardiovascular - Adult  Goal: Maintains optimal cardiac output and hemodynamic stability  Outcome: Progressing  Flowsheets (Taken 2/17/2023 0743)  Maintains optimal cardiac output and hemodynamic stability: Monitor blood pressure and heart rate  Goal: Absence of cardiac dysrhythmias or at baseline  Outcome: Progressing  Flowsheets (Taken 2/17/2023 0743)  Absence of cardiac dysrhythmias or at baseline: Monitor cardiac rate and rhythm     Problem: Skin/Tissue Integrity - Adult  Goal: Skin integrity remains intact  Outcome: Progressing  Flowsheets (Taken 2/17/2023 0743)  Skin Integrity Remains Intact: Monitor for areas of redness and/or skin breakdown  Goal: Incisions, wounds, or drain sites healing without S/S of infection  Outcome: Progressing  Flowsheets (Taken 2/17/2023 0743)  Incisions, Wounds, or Drain Sites Healing Without Sign and Symptoms of Infection: ADMISSION and DAILY: Assess and document risk factors for pressure ulcer development  Goal: Oral mucous membranes remain intact  Outcome: Progressing  Flowsheets (Taken 2/17/2023 0743)  Oral Mucous Membranes Remain Intact: Assess oral mucosa and hygiene practices     Problem: Musculoskeletal - Adult  Goal: Return mobility to safest level of function  Outcome: Progressing     Problem: Gastrointestinal - Adult  Goal: Minimal or absence of nausea and vomiting  Outcome: Progressing     Problem: Genitourinary - Adult  Goal: Absence of urinary retention  Outcome: Progressing  Flowsheets (Taken 2/17/2023 0743)  Absence of urinary retention: Assess patients ability to void and empty bladder  Goal: Urinary catheter remains patent  Outcome: Progressing  Flowsheets (Taken 2/17/2023 0743)  Urinary catheter remains patent: Assess patency of urinary catheter     Problem: Infection - Adult  Goal: Absence of infection at discharge  Outcome: Progressing  Goal: Absence of infection during hospitalization  Outcome: Progressing  Goal: Absence of fever/infection during anticipated neutropenic period  Outcome: Progressing     Problem: Metabolic/Fluid and Electrolytes - Adult  Goal: Electrolytes maintained within normal limits  Outcome: Progressing  Goal: Hemodynamic stability and optimal renal function maintained  Outcome: Progressing  Goal: Glucose maintained within prescribed range  Outcome: Progressing     Problem: Hematologic - Adult  Goal: Maintains hematologic stability  Outcome: Progressing

## 2023-02-17 NOTE — PLAN OF CARE
Problem: Chronic Conditions and Co-morbidities  Goal: Patient's chronic conditions and co-morbidity symptoms are monitored and maintained or improved  Outcome: Progressing  Flowsheets  Taken 2/16/2023 1906 by Abelardo Saleem RN  Care Plan - Patient's Chronic Conditions and Co-Morbidity Symptoms are Monitored and Maintained or Improved:   Monitor and assess patient's chronic conditions and comorbid symptoms for stability, deterioration, or improvement   Collaborate with multidisciplinary team to address chronic and comorbid conditions and prevent exacerbation or deterioration   Update acute care plan with appropriate goals if chronic or comorbid symptoms are exacerbated and prevent overall improvement and discharge  Taken 2/16/2023 1233 by Lazara Al [address] - Patient's Chronic Conditions and Co-Morbidity Symptoms are Monitored and Maintained or Improved: Monitor and assess patient's chronic conditions and comorbid symptoms for stability, deterioration, or improvement     Problem: Pain  Goal: Verbalizes/displays adequate comfort level or baseline comfort level  Outcome: Progressing  Flowsheets (Taken 2/16/2023 1233 by Mildred Sousa RN)  Verbalizes/displays adequate comfort level or baseline comfort level:   Encourage patient to monitor pain and request assistance   Assess pain using appropriate pain scale     Problem: Discharge Planning  Goal: Discharge to home or other facility with appropriate resources  Outcome: Progressing  Flowsheets  Taken 2/16/2023 1906 by Abelardo Saleem RN  Discharge to home or other facility with appropriate resources:   Identify barriers to discharge with patient and caregiver   Arrange for needed discharge resources and transportation as appropriate   Identify discharge learning needs (meds, wound care, etc)  Taken 2/16/2023 1233 by Mildred Sousa RN  Discharge to home or other facility with appropriate resources: Identify barriers to discharge with patient and caregiver Problem: Skin/Tissue Integrity  Goal: Absence of new skin breakdown  Description: 1. Monitor for areas of redness and/or skin breakdown  2. Assess vascular access sites hourly  3. Every 4-6 hours minimum:  Change oxygen saturation probe site  4. Every 4-6 hours:  If on nasal continuous positive airway pressure, respiratory therapy assess nares and determine need for appliance change or resting period.   Outcome: Progressing     Problem: Safety - Adult  Goal: Free from fall injury  Outcome: Progressing  Flowsheets (Taken 2/16/2023 1906)  Free From Fall Injury:   Instruct family/caregiver on patient safety   Based on caregiver fall risk screen, instruct family/caregiver to ask for assistance with transferring infant if caregiver noted to have fall risk factors     Problem: Neurosensory - Adult  Goal: Achieves stable or improved neurological status  Outcome: Progressing     Problem: Respiratory - Adult  Goal: Achieves optimal ventilation and oxygenation  Outcome: Progressing     Problem: Cardiovascular - Adult  Goal: Maintains optimal cardiac output and hemodynamic stability  Outcome: Progressing  Flowsheets  Taken 2/16/2023 1906 by Liz López RN  Maintains optimal cardiac output and hemodynamic stability:   Monitor blood pressure and heart rate   Monitor urine output and notify Licensed Independent Practitioner for values outside of normal range   Assess for signs of decreased cardiac output  Taken 2/16/2023 1233 by Linnea Anderson RN  Maintains optimal cardiac output and hemodynamic stability: Monitor blood pressure and heart rate  Goal: Absence of cardiac dysrhythmias or at baseline  Outcome: Progressing  Flowsheets  Taken 2/16/2023 1906 by Liz López RN  Absence of cardiac dysrhythmias or at baseline:   Monitor cardiac rate and rhythm   Assess for signs of decreased cardiac output   Administer antiarrhythmia medication and electrolyte replacement as ordered  Taken 2/16/2023 1233 by Linnea Anderson RN  Absence of cardiac dysrhythmias or at baseline: Monitor cardiac rate and rhythm     Problem: Skin/Tissue Integrity - Adult  Goal: Skin integrity remains intact  Outcome: Progressing  Flowsheets  Taken 2/16/2023 1906 by iTno Davenport RN  Skin Integrity Remains Intact: Monitor for areas of redness and/or skin breakdown  Taken 2/16/2023 1233 by Janette Glasgow RN  Skin Integrity Remains Intact: Monitor for areas of redness and/or skin breakdown  Goal: Incisions, wounds, or drain sites healing without S/S of infection  Outcome: Progressing  Flowsheets  Taken 2/16/2023 1906 by Tino Davenport RN  Incisions, Wounds, or Drain Sites Healing Without Sign and Symptoms of Infection: ADMISSION and DAILY: Assess and document risk factors for pressure ulcer development  Taken 2/16/2023 1233 by Janette Glasgow RN  Incisions, Wounds, or Drain Sites Healing Without Sign and Symptoms of Infection: ADMISSION and DAILY: Assess and document risk factors for pressure ulcer development  Goal: Oral mucous membranes remain intact  Outcome: Progressing  Flowsheets  Taken 2/16/2023 1906 by Tino Davenport RN  Oral Mucous Membranes Remain Intact: Assess oral mucosa and hygiene practices  Taken 2/16/2023 1233 by Janette Glasgow RN  Oral Mucous Membranes Remain Intact: Assess oral mucosa and hygiene practices     Problem: Musculoskeletal - Adult  Goal: Return mobility to safest level of function  Outcome: Progressing  Flowsheets  Taken 2/16/2023 1906 by Tino Davenport RN  Return Mobility to Safest Level of Function:   Assess patient stability and activity tolerance for standing, transferring and ambulating with or without assistive devices   Assist with transfers and ambulation using safe patient handling equipment as needed   Ensure adequate protection for wounds/incisions during mobilization   Obtain physical therapy/occupational therapy consults as needed  Taken 2/16/2023 1233 by Janette Glasgow RN  Return Mobility to Safest Level of Function: Assess patient stability and activity tolerance for standing, transferring and ambulating with or without assistive devices     Problem: Gastrointestinal - Adult  Goal: Minimal or absence of nausea and vomiting  Outcome: Progressing     Problem: Genitourinary - Adult  Goal: Absence of urinary retention  Outcome: Progressing  Flowsheets  Taken 2/16/2023 1906 by Abelardo Saleem RN  Absence of urinary retention:   Assess patients ability to void and empty bladder   Monitor intake/output and perform bladder scan as needed  Taken 2/16/2023 1233 by Mildred Sousa RN  Absence of urinary retention: Assess patients ability to void and empty bladder  Goal: Urinary catheter remains patent  Outcome: Progressing     Problem: Infection - Adult  Goal: Absence of infection at discharge  Outcome: Progressing  Flowsheets  Taken 2/16/2023 1906 by Abelardo Saleem RN  Absence of infection at discharge:   Assess and monitor for signs and symptoms of infection   Monitor lab/diagnostic results   Monitor all insertion sites i.e., indwelling lines, tubes and drains  Taken 2/16/2023 1233 by Mildred Sousa RN  Absence of infection at discharge: Assess and monitor for signs and symptoms of infection  Goal: Absence of infection during hospitalization  Outcome: Progressing  Flowsheets  Taken 2/16/2023 1906 by Abelardo Saleem RN  Absence of infection during hospitalization:   Assess and monitor for signs and symptoms of infection   Monitor lab/diagnostic results   Monitor all insertion sites i.e., indwelling lines, tubes and drains  Taken 2/16/2023 1233 by Mildred Sousa RN  Absence of infection during hospitalization: Assess and monitor for signs and symptoms of infection  Goal: Absence of fever/infection during anticipated neutropenic period  Outcome: Progressing  Flowsheets  Taken 2/16/2023 1906 by Abelardo Saleem RN  Absence of fever/infection during anticipated neutropenic period: Monitor white blood cell count  Taken 2/16/2023 1233 by Nicolasa Graff RN  Absence of fever/infection during anticipated neutropenic period: Monitor white blood cell count     Problem: Metabolic/Fluid and Electrolytes - Adult  Goal: Electrolytes maintained within normal limits  Outcome: Progressing  Flowsheets  Taken 2/16/2023 1906 by Jolene Garcia RN  Electrolytes maintained within normal limits: Monitor labs and assess patient for signs and symptoms of electrolyte imbalances  Taken 2/16/2023 1233 by Nicolasa Graff RN  Electrolytes maintained within normal limits: Monitor labs and assess patient for signs and symptoms of electrolyte imbalances  Goal: Hemodynamic stability and optimal renal function maintained  Outcome: Progressing  Flowsheets  Taken 2/16/2023 1906 by Jolene Garcia RN  Hemodynamic stability and optimal renal function maintained:   Monitor labs and assess for signs and symptoms of volume excess or deficit   Monitor intake, output and patient weight   Monitor urine specific gravity, serum osmolarity and serum sodium as indicated or ordered  Taken 2/16/2023 1233 by Nicolasa Graff RN  Hemodynamic stability and optimal renal function maintained: Monitor labs and assess for signs and symptoms of volume excess or deficit  Goal: Glucose maintained within prescribed range  Outcome: Progressing  Flowsheets  Taken 2/16/2023 1906 by Jolene Garcia RN  Glucose maintained within prescribed range:   Monitor blood glucose as ordered   Assess for signs and symptoms of hyperglycemia and hypoglycemia   Assess barriers to adequate nutritional intake and initiate nutrition consult as needed  Taken 2/16/2023 1233 by Nicolasa Graff RN  Glucose maintained within prescribed range: Monitor blood glucose as ordered     Problem: Hematologic - Adult  Goal: Maintains hematologic stability  Outcome: Progressing  Flowsheets  Taken 2/16/2023 1906 by Jolene Garcia RN  Maintains hematologic stability:   Assess for signs and symptoms of bleeding or hemorrhage   Monitor labs for bleeding or clotting disorders  Taken 2/16/2023 1233 by Marion Soria, RN  Maintains hematologic stability: Assess for signs and symptoms of bleeding or hemorrhage

## 2023-02-17 NOTE — PROGRESS NOTES
ACUTE PHYSICAL THERAPY GOALS:   (Developed with and agreed upon by patient and/or caregiver. )  LTG:  (1.)Mr. Arnav Sandoval will move from supine to sit and sit to supine , scoot up and down, and roll side to side in bed with INDEPENDENT within 7 treatment day(s). (2.)Mr. Arnav Sandoval will transfer from bed to chair and chair to bed with STAND BY ASSIST using the least restrictive device within 7 treatment day(s). (3.)Mr. Arnav Sandoval will ambulate with STAND BY ASSIST for 500+ feet with the least restrictive device within 7 treatment day(s) while maintaining normal vital signs. (4.)Mr. Arnav Sandoval will be independent in HEP for B LE strengthening and activity tolerance within 7 treatment days. ________________________________________________________________________________________________     PHYSICAL THERAPY: Daily Note AM   (Link to Caseload Tracking: PT Visit Days : 1  Time In/Out PT Charge Capture  Rehab Caseload Tracker  Orders    Juan Jose Graham is a 75 y.o. male   PRIMARY DIAGNOSIS: S/P CABG x 3  Atherosclerotic heart disease of native coronary artery with unstable angina pectoris (HCC) [I25.110]  Accelerating angina (HCC) [I20.0]  Coronary artery disease (CAD) excluded [Z03.89]  Procedure(s) (LRB):  CABG CORONARY ARTERY BYPASS (CABGX3), [place], ENDOSCOPIC GREATER SAPHENOUS VEIN HARVEST LEFT LEG, TRANSEASOPHAGEAL ECHOCARDIOGRAM (N/A)  2 Days Post-Op  Inpatient: Payor: Starr Saenz / Plan: Starr Saenz / Product Type: *No Product type* /     ASSESSMENT:     REHAB RECOMMENDATIONS:   Recommendation to date pending progress:  Setting:  Home Health Therapy    Equipment:    To Be Determined     ASSESSMENT:  Mr. Arnav Sandoval is sitting in chair and agreeable to therapy, wants to go to the bathroom. On RA. Sit to stand with min assist and assisted too the bathroom, gait training with rolling walker x 200 feet with slow yg.   Patient is returned tot he recliner and after a short rest break the patient was instructed in therapeutic exercises, tolerated well. Good session and progress demonstrated. PT to cont to follow for acute care needs.    HHPT at d/c.     SUBJECTIVE:   Mr. Jada Dominguez states, \"I need to go to the bathroom\"     Social/Functional Lives With: Spouse  Type of Home: House  Home Layout: One level  Home Access: Ramped entrance  ADL Assistance: Independent  Ambulation Assistance: Independent  Active : Yes  Mode of Transportation: Car  OBJECTIVE:     PAIN: [place] / O2: Ananth Fenton / Flako Hardin / Trev Torres:   Pre Treatment: 0/10         Post Treatment: 0/10 Vitals        Oxygen    Telemetry     RESTRICTIONS/PRECAUTIONS:  Position Activity Restriction  Sternal Precautions: No Pushing, No Pulling, 5# Lifting Restrictions     MOBILITY: I Mod I S SBA CGA Min Mod Max Total  NT x2 Comments:   Bed Mobility    Rolling [] [] [] [] [] [] [] [] [] [x] [] In chair   Supine to Sit [] [] [] [] [] [] [] [] [] [x] []    Scooting [] [] [] [] [] [] [] [] [] [x] []    Sit to Supine [] [] [] [] [] [] [] [] [] [x] []    Transfers    Sit to Stand [] [] [] [] [] [x] [] [] [] [] []    Bed to Chair [] [] [] [] [] [] [] [] [] [x] []    Stand to Sit [] [] [] [] [] [x] [] [] [] [] []     [] [] [] [] [] [] [] [] [] [] []    I=Independent, Mod I=Modified Independent, S=Supervision, SBA=Standby Assistance, CGA=Contact Guard Assistance,   Min=Minimal Assistance, Mod=Moderate Assistance, Max=Maximal Assistance, Total=Total Assistance, NT=Not Tested    BALANCE: Good Fair+ Fair Fair- Poor NT Comments   Sitting Static [x] [] [] [] [] []    Sitting Dynamic [x] [] [] [] [] []              Standing Static [] [x] [] [] [] []    Standing Dynamic [] [x] [] [] [] []      GAIT: I Mod I S SBA CGA Min Mod Max Total  NT x2 Comments:   Level of Assistance [] [] [] [] [x] [] [] [] [] [] []    Distance 200  feet    DME Rolling Walker    Gait Quality Antalgic, Decreased yg , Decreased step clearance, and Decreased step length    Weightbearing Status      Stairs      I=Independent, Mod I=Modified Independent, S=Supervision, SBA=Standby Assistance, CGA=Contact Guard Assistance,   Min=Minimal Assistance, Mod=Moderate Assistance, Max=Maximal Assistance, Total=Total Assistance, NT=Not Tested    PLAN:   FREQUENCY AND DURATION: BID for duration of hospital stay or until stated goals are met, whichever comes first.    TREATMENT:   TREATMENT:   Therapeutic Activity (14 Minutes): Therapeutic activity included Scooting, Transfer Training, Ambulation on level ground, Sitting balance , and Standing balance to improve functional Activity tolerance, Balance, Coordination, Mobility, Strength, and ROM. Therapeutic Exercise (13 Minutes): Therapeutic exercises noted below to improve functional activity tolerance, AROM, strength, and mobility.      TREATMENT GRID:     Date:  2/17/23 Date:   Date:     ACTIVITY/EXERCISE AM PM AM PM AM PM   LAQ 15        Shoulder shrugs 15        Gluteal sets 15        marching 15        Ankle pumps 15        abduction 15                 B = bilateral; AA = active assistive; A = active; P = passive    AFTER TREATMENT PRECAUTIONS: Bed/Chair Locked, Call light within reach, Chair, Needs within reach, and RN notified    INTERDISCIPLINARY COLLABORATION:  RN/ PCT and PT/ PTA    EDUCATION:  review POC and importance of mobility in the recovery process     TIME IN/OUT:  Time In: 1025  Time Out: [place]  Minutes: 32    Carolyn Lyn PTA

## 2023-02-17 NOTE — OP NOTE
[facility]  OPERATIVE REPORT    Name:  Karissa Villanueva  MR#:  [id number]  :  1950  ACCOUNT #:  [de-identified]  DATE OF SERVICE:  02/15/2023    PREOPERATIVE DIAGNOSIS:  Coronary artery occlusive disease. POSTOPERATIVE DIAGNOSIS:  Coronary artery occlusive disease. PROCEDURE PERFORMED:  Three-vessel coronary artery bypass grafting using reverse saphenous vein graft to the to the right coronary artery, reverse saphenous vein graft to the intermediate coronary, left internal mammary artery to left anterior descending coronary; endoscopic vein harvest; (arterial line placed by anesthesia); temporary right ventricular pacing wires. SURGEON:  Nae Dolan MD    ASSISTANT:  ***    ANESTHESIA:  General.    COMPLICATIONS:  ***. SPECIMENS REMOVED:  ***. IMPLANTS:  ***. ESTIMATED BLOOD LOSS:  Minimal.    CARDIOPULMONARY BYPASS TIME:  81 minutes. AORTIC CROSSCLAMP TIME:  64 minutes. PREOPERATIVE HISTORY:  This is a 71-year-old gentleman with chest pain and cardiac catheterization showing severe triple-vessel disease with tight left main coronary stenosis. Urgent surgery was recommended. WHAT WAS FOUND AND WHAT WAS DONE:  The heart was exposed through a median sternotomy. The heart was normal in size, contracted well. There was no transmural scarring. Three coronaries were grafted, placing reverse vein to the distal right coronary artery, reverse vein to a large intermediate coronary and the internal mammary artery used to bypass the mid left anterior descending coronary. The patient came off bypass without trouble. PROCEDURE:  The patient was premedicated and brought to the operating room. The patient placed supine on the table and appropriate monitoring lines were placed including a radial artery catheter with flow tract monitoring. General endotracheal anesthesia was given.   The anterior body and both legs then prepped with Betadine and the patient draped into a sterile field.  Endoscopic vein harvest was carried out on the left leg. The vein was removed and the skin of the leg was closed with subcuticular closure technique. The vein was prepared in standard fashion. The chest was opened in midline. A sternotomy was carried out. Left pleural cavity was opened widely. Mammary artery was taken down. Next, pericardium was opened vertically and retracted. Heparin at 300 units/kg was given. The patient was cannulated, placed on bypass and cooled to 32 degrees centigrade. Aorta was crossclamped. Blood cardioplegia was given antegrade. The intermediate coronary was identified, opened for a 5 mm length and reverse vein was sutured to this vessel with a running 7-0 Prolene. Next, the distal right coronary artery was identified, opened for a 5 mm distance and a second reverse vein was sutured to this vessel with 7-0 Prolene and finally the internal mammary artery was sutured to the mid left anterior descending coronary with a running 7-0 Prolene. Further cardioplegia was given. Next, two 4-mm buttons of aortic wall were opened and the proximal veins were sutured directly to the aorta with 6-0 Prolene. Following this, the patient was rewarmed 37 degrees centigrade. He was weaned from bypass without difficulty. The crossclamp was released following the proximal anastomosis. All blood was returned to the patient, after which cannulas were removed and pursestring sutures tied. Protamine was then given to reverse the heparin. Temporary right ventricular pacing wires were placed. 32-Armenian tubes left to drain the mediastinum and left chest cavity. Hemostasis was satisfactory. The sternum was then approximated with interrupted stainless steel wire. Soft tissue was closed with Vicryl and the skin was closed with Vicryl using subcuticular closure technique. The patient tolerated the procedure well, was moved to intensive care in stable condition.   Sponge, needle and instrument counts reported as correct.       Gale Marie MD      [id number]  D:  02/17/2023 8:21  T:  02/17/2023 9:01  JOB #:  [id number]

## 2023-02-17 NOTE — PROGRESS NOTES
Cardiac Rehab: Spoke with patient regarding referral to cardiac rehab. Patient meets admission criteria based on CABGx3 (2/15/23). Written information about Cardiac Rehab given and reviewed with patient. Discussed lifestyle modifications to promote cardiac wellness. Patient indicated that he wants to participate in the cardiac rehab program and his orientation has been scheduled. His Cardiologist is Dr. Maksim Luque.       Thank you,  ADAIR BolanosN, RN  Cardiopulmonary Rehabilitation Nurse Liaison  [facility]

## 2023-02-18 ENCOUNTER — APPOINTMENT (OUTPATIENT)
Dept: GENERAL RADIOLOGY | Age: 73
DRG: 236 | End: 2023-02-18
Attending: THORACIC SURGERY (CARDIOTHORACIC VASCULAR SURGERY)
Payer: OTHER GOVERNMENT

## 2023-02-18 LAB
ERYTHROCYTE [DISTWIDTH] IN BLOOD BY AUTOMATED COUNT: 13.6 % (ref 11.9–14.6)
GLUCOSE BLD STRIP.AUTO-MCNC: 177 MG/DL (ref 65–100)
GLUCOSE BLD STRIP.AUTO-MCNC: 208 MG/DL (ref 65–100)
GLUCOSE BLD STRIP.AUTO-MCNC: 251 MG/DL (ref 65–100)
GLUCOSE BLD STRIP.AUTO-MCNC: 289 MG/DL (ref 65–100)
HCT VFR BLD AUTO: 35.1 % (ref 41.1–50.3)
HGB BLD-MCNC: 11.8 G/DL (ref 13.6–17.2)
MAGNESIUM SERPL-MCNC: 2.1 MG/DL (ref 1.8–2.4)
MCH RBC QN AUTO: 29.7 PG (ref 26.1–32.9)
MCHC RBC AUTO-ENTMCNC: 33.6 G/DL (ref 31.4–35)
MCV RBC AUTO: 88.4 FL (ref 82–102)
NRBC # BLD: 0 K/UL (ref 0–0.2)
PLATELET # BLD AUTO: 196 K/UL (ref 150–450)
PMV BLD AUTO: 10.5 FL (ref 9.4–12.3)
POTASSIUM SERPL-SCNC: 3.5 MMOL/L (ref 3.5–5.1)
RBC # BLD AUTO: 3.97 M/UL (ref 4.23–5.6)
SERVICE CMNT-IMP: ABNORMAL
WBC # BLD AUTO: 10.1 K/UL (ref 4.3–11.1)

## 2023-02-18 PROCEDURE — 6370000000 HC RX 637 (ALT 250 FOR IP): Performed by: PHYSICIAN ASSISTANT

## 2023-02-18 PROCEDURE — 97110 THERAPEUTIC EXERCISES: CPT

## 2023-02-18 PROCEDURE — 82962 GLUCOSE BLOOD TEST: CPT

## 2023-02-18 PROCEDURE — 2580000003 HC RX 258: Performed by: PHYSICIAN ASSISTANT

## 2023-02-18 PROCEDURE — 2140000001 HC CVICU INTERMEDIATE R&B

## 2023-02-18 PROCEDURE — 6370000000 HC RX 637 (ALT 250 FOR IP): Performed by: THORACIC SURGERY (CARDIOTHORACIC VASCULAR SURGERY)

## 2023-02-18 PROCEDURE — 97530 THERAPEUTIC ACTIVITIES: CPT

## 2023-02-18 PROCEDURE — 85027 COMPLETE CBC AUTOMATED: CPT

## 2023-02-18 PROCEDURE — 84132 ASSAY OF SERUM POTASSIUM: CPT

## 2023-02-18 PROCEDURE — 83735 ASSAY OF MAGNESIUM: CPT

## 2023-02-18 PROCEDURE — 71045 X-RAY EXAM CHEST 1 VIEW: CPT

## 2023-02-18 PROCEDURE — 36415 COLL VENOUS BLD VENIPUNCTURE: CPT

## 2023-02-18 RX ADMIN — ROSUVASTATIN CALCIUM 40 MG: 20 TABLET, COATED ORAL at 21:08

## 2023-02-18 RX ADMIN — FAMOTIDINE 20 MG: 20 TABLET ORAL at 08:33

## 2023-02-18 RX ADMIN — CARVEDILOL 3.12 MG: 3.12 TABLET, FILM COATED ORAL at 21:09

## 2023-02-18 RX ADMIN — INSULIN LISPRO 6 UNITS: 100 INJECTION, SOLUTION INTRAVENOUS; SUBCUTANEOUS at 12:20

## 2023-02-18 RX ADMIN — POTASSIUM CHLORIDE 10 MEQ: 750 TABLET, EXTENDED RELEASE ORAL at 08:32

## 2023-02-18 RX ADMIN — POTASSIUM CHLORIDE 20 MEQ: 1500 TABLET, EXTENDED RELEASE ORAL at 08:34

## 2023-02-18 RX ADMIN — INSULIN LISPRO 2 UNITS: 100 INJECTION, SOLUTION INTRAVENOUS; SUBCUTANEOUS at 08:33

## 2023-02-18 RX ADMIN — CARVEDILOL 3.12 MG: 3.12 TABLET, FILM COATED ORAL at 08:33

## 2023-02-18 RX ADMIN — FAMOTIDINE 20 MG: 20 TABLET ORAL at 21:09

## 2023-02-18 RX ADMIN — Medication 6 MG: at 21:08

## 2023-02-18 RX ADMIN — Medication 1 AMPULE: at 09:56

## 2023-02-18 RX ADMIN — SENNOSIDES AND DOCUSATE SODIUM 2 TABLET: 8.6; 5 TABLET ORAL at 08:32

## 2023-02-18 RX ADMIN — INSULIN LISPRO 4 UNITS: 100 INJECTION, SOLUTION INTRAVENOUS; SUBCUTANEOUS at 17:38

## 2023-02-18 RX ADMIN — ONDANSETRON 4 MG: 4 TABLET, ORALLY DISINTEGRATING ORAL at 11:15

## 2023-02-18 RX ADMIN — ASPIRIN 81 MG: 81 TABLET, COATED ORAL at 08:32

## 2023-02-18 RX ADMIN — OXYCODONE AND ACETAMINOPHEN 1 TABLET: 5; 325 TABLET ORAL at 21:09

## 2023-02-18 RX ADMIN — INSULIN GLARGINE 10 UNITS: 100 INJECTION, SOLUTION SUBCUTANEOUS at 21:09

## 2023-02-18 RX ADMIN — AMIODARONE HYDROCHLORIDE 200 MG: 200 TABLET ORAL at 21:09

## 2023-02-18 RX ADMIN — FUROSEMIDE 40 MG: 40 TABLET ORAL at 08:33

## 2023-02-18 RX ADMIN — AMIODARONE HYDROCHLORIDE 200 MG: 200 TABLET ORAL at 08:33

## 2023-02-18 RX ADMIN — FINASTERIDE 5 MG: 5 TABLET, FILM COATED ORAL at 08:33

## 2023-02-18 RX ADMIN — INSULIN LISPRO 3 UNITS: 100 INJECTION, SOLUTION INTRAVENOUS; SUBCUTANEOUS at 21:14

## 2023-02-18 RX ADMIN — OXYCODONE AND ACETAMINOPHEN 1 TABLET: 5; 325 TABLET ORAL at 03:35

## 2023-02-18 RX ADMIN — SODIUM CHLORIDE, PRESERVATIVE FREE 10 ML: 5 INJECTION INTRAVENOUS at 08:34

## 2023-02-18 ASSESSMENT — PAIN SCALES - GENERAL
PAINLEVEL_OUTOF10: 7
PAINLEVEL_OUTOF10: 0
PAINLEVEL_OUTOF10: 0
PAINLEVEL_OUTOF10: 7

## 2023-02-18 ASSESSMENT — PAIN DESCRIPTION - ORIENTATION
ORIENTATION: MID
ORIENTATION: MID

## 2023-02-18 ASSESSMENT — PAIN DESCRIPTION - LOCATION
LOCATION: INCISION
LOCATION: INCISION

## 2023-02-18 ASSESSMENT — PAIN DESCRIPTION - DESCRIPTORS
DESCRIPTORS: ACHING
DESCRIPTORS: ACHING

## 2023-02-18 NOTE — PROGRESS NOTES
ACUTE PHYSICAL THERAPY GOALS:   (Developed with and agreed upon by patient and/or caregiver. )  LTG:  (1.)Mr. Karina Alexandra will move from supine to sit and sit to supine , scoot up and down, and roll side to side in bed with INDEPENDENT within 7 treatment day(s). (2.)Mr. Karina Alexandra will transfer from bed to chair and chair to bed with STAND BY ASSIST using the least restrictive device within 7 treatment day(s). (3.)Mr. Karina Alexandra will ambulate with STAND BY ASSIST for 500+ feet with the least restrictive device within 7 treatment day(s) while maintaining normal vital signs. (4.)Mr. Karina Alexandra will be independent in HEP for B LE strengthening and activity tolerance within 7 treatment days. ________________________________________________________________________________________________     PHYSICAL THERAPY: Daily Note AM   (Link to Caseload Tracking: PT Visit Days : 3  Time In/Out PT Charge Capture  Rehab Caseload Tracker  Orders    Laura Narayan is a 75 y.o. male   PRIMARY DIAGNOSIS: S/P CABG x 3  Atherosclerotic heart disease of native coronary artery with unstable angina pectoris (HCC) [I25.110]  Accelerating angina (HCC) [I20.0]  Coronary artery disease (CAD) excluded [Z03.89]  Procedure(s) (LRB):  CABG CORONARY ARTERY BYPASS (CABGX3), LIMA, ENDOSCOPIC GREATER SAPHENOUS VEIN HARVEST LEFT LEG, TRANSEASOPHAGEAL ECHOCARDIOGRAM (N/A)  3 Days Post-Op  Inpatient: Payor: Jose Eduardo Roberts / Plan: Jose Eduardo Roberts / Product Type: *No Product type* /     ASSESSMENT:     REHAB RECOMMENDATIONS:   Recommendation to date pending progress:  Setting:  Home Health Therapy    Equipment:    To Be Determined     ASSESSMENT:  Mr. Karina Alexandra was sitting up in chair on arrival. He is agreeable to PT and states he just had a BM and tired from that. Ambulated 250' with RW and CGA/SBA with slow gait. BLE exercises performed while up in chair. Pt left with needs in reach.       SUBJECTIVE:   Mr. Karina Alexandra states, \"I just had a BM\"     Social/Functional Lives With: Spouse  Type of Home: House  Home Layout: One level  Home Access: Ramped entrance  ADL Assistance: Independent  Ambulation Assistance: Independent  Active : Yes  Mode of Transportation: Car  OBJECTIVE:     PAIN: Rhunette Began / O2: Everlena Dilling / Kori Lightning / Caleen Benham:   Pre Treatment:  not rated         Post Treatment:  not rated Vitals        Oxygen    Telemetry     RESTRICTIONS/PRECAUTIONS:  Position Activity Restriction  Sternal Precautions: No Pushing, No Pulling, 5# Lifting Restrictions     MOBILITY: I Mod I S SBA CGA Min Mod Max Total  NT x2 Comments:   Bed Mobility    Rolling [] [] [] [] [] [] [] [] [] [x] [] In chair   Supine to Sit [] [] [] [] [] [] [] [] [] [x] []    Scooting [] [] [] [] [] [] [] [] [] [x] []    Sit to Supine [] [] [] [] [] [] [] [] [] [x] []    Transfers    Sit to Stand [] [] [] [x] [] [] [] [] [] [] []    Bed to Chair [] [] [] [] [] [] [] [] [] [x] []    Stand to Sit [] [] [] [x] [] [] [] [] [] [] []     [] [] [] [] [] [] [] [] [] [] []    I=Independent, Mod I=Modified Independent, S=Supervision, SBA=Standby Assistance, CGA=Contact Guard Assistance,   Min=Minimal Assistance, Mod=Moderate Assistance, Max=Maximal Assistance, Total=Total Assistance, NT=Not Tested    BALANCE: Good Fair+ Fair Fair- Poor NT Comments   Sitting Static [x] [] [] [] [] []    Sitting Dynamic [x] [] [] [] [] []              Standing Static [] [x] [] [] [] []    Standing Dynamic [] [x] [] [] [] []      GAIT: I Mod I S SBA CGA Min Mod Max Total  NT x2 Comments:   Level of Assistance [] [] [] [x] [x] [] [] [] [] [] []    Distance 250  feet    DME Rolling Walker    Gait Quality Antalgic, Decreased yg , Decreased step clearance, and Decreased step length    Weightbearing Status      Stairs      I=Independent, Mod I=Modified Independent, S=Supervision, SBA=Standby Assistance, CGA=Contact Guard Assistance,   Min=Minimal Assistance, Mod=Moderate Assistance, Max=Maximal Assistance, Total=Total Assistance, NT=Not Tested    PLAN: FREQUENCY AND DURATION: BID for duration of hospital stay or until stated goals are met, whichever comes first.    TREATMENT:   TREATMENT:   Therapeutic Activity (15 Minutes): Therapeutic activity included Scooting, Transfer Training, Ambulation on level ground, Sitting balance , and Standing balance to improve functional Activity tolerance, Balance, Coordination, Mobility, Strength, and ROM. Therapeutic Exercise (10 Minutes): Therapeutic exercises noted below to improve functional activity tolerance, AROM, strength, and mobility.      TREATMENT GRID:     Date:  2/17/23 Date:  2/18/23 Date:     ACTIVITY/EXERCISE AM PM AM PM AM PM   LAQ 15  15 B      Shoulder shrugs 15  15 B      Gluteal sets 15   15 B      marching 15  15 B      Ankle pumps 15  15 B      abduction 15  15 B               B = bilateral; AA = active assistive; A = active; P = passive    AFTER TREATMENT PRECAUTIONS: Bed/Chair Locked, Call light within reach, Chair, Needs within reach, and RN notified    INTERDISCIPLINARY COLLABORATION:  RN/ PCT and PT/ PTA    EDUCATION:  review POC and importance of mobility in the recovery process     TIME IN/OUT:  Time In: 1276  Time Out: 4650  Minutes: [address] PTA

## 2023-02-18 NOTE — PROGRESS NOTES
Progress Note    2023 10:58 AM          POD#   1 is postoperative day #3 from a three-vessel coronary artery bypass grafting. Patient has history of skin cancer on the nose and right carotid stenosis. And diabetes. Subjective:  Mr. Aftab Lee doing well without significant complaints    OBJECTIVE:    PULSE OXIMETRY RANGE: SpO2  Av.7 %  Min: 92 %  Max: 98 %  SUPPLEMENTAL O2: O2 Flow Rate (L/min): 6 L/min   Vital Signs: /62   Pulse 88   Temp 98.3 °F (36.8 °C) (Oral)   Resp 20   Ht 5' 10\" (1.778 m)   Wt 162 lb 3.2 oz (73.6 kg)   SpO2 98%   BMI 23.27 kg/m²    Temperature Range:   Temp: 98.3 °F (36.8 °C)   Temp  Av.6 °F (37 °C)  Min: 98.2 °F (36.8 °C)  Max: 99 °F (37.2 °C)      Exam:   General appearance:   Neck:   Lungs: Lungs are clear to auscultation  Sternum: Sternal incision is dry and intact  Heart: Sinus rhythm  Abdomen: Abdomen is soft nontender  Extremities:   Leg Wounds: Some drainage of the left lower leg from vein harvest site                 Rhythm: Sinus rhythm    Labs:   ABG:  No results found for: PHART, PO2ART, XGI9FKX, T9LZFYBF, EVM3KIQ, THGBART, VVW0ITO, BEART  CBC:   Recent Labs     23  0424 23  1109 23  0627   WBC 8.9 11.8* 10.1   HGB 9.8* 12.1* 11.8*   HCT 29.8* 36.8* 35.1*   MCV 90.3 89.3 88.4   * 178 196     BMP:   Recent Labs     02/15/23  1330 02/15/23  1712 23  0428 23  1109 23  0627     --  141 134  --    K 4.0   < > 4.6 4.6 3.5   *  --  114* 102  --    CO2 23  --  19* 26  --    BUN 19  --  16 19  --    CREATININE 1.20  --  1.00 1.20  --     < > = values in this interval not displayed.      PT/INR:   Recent Labs     02/15/23  1330   PROTIME 17.1*   INR 1.3     APTT:   Recent Labs     02/15/23  1330   APTT 30.6       Chest X-Ray: Possible trace bilateral pleural effusions    CT scan of the neck here for assessment of carotid disease shows stenosis at the origin of the right internal carotid artery 75% no significant stenosis of the left internal carotid artery. CT:  No intake/output data recorded. Air Leak:   No intake/output data recorded. Scheduled Meds:    insulin glargine  10 Units SubCUTAneous Nightly    finasteride  5 mg Oral Daily    sodium chloride flush  5-40 mL IntraVENous 2 times per day    furosemide  40 mg Oral Daily    potassium chloride  10 mEq Oral Daily    famotidine  20 mg Oral BID    Or    famotidine (PEPCID) injection  20 mg IntraVENous BID    insulin lispro  0-12 Units SubCUTAneous TID WC    insulin lispro  0-6 Units SubCUTAneous Nightly    sennosides-docusate sodium  2 tablet Oral BID    alcohol 62%  1 ampule Topical Q12H    aspirin  81 mg Oral Daily    amiodarone  200 mg Oral BID    carvedilol  3.125 mg Oral BID    rosuvastatin  40 mg Oral Nightly       Continuous Infusions:    dextrose           Assessment  Patient overall doing well status post coronary artery bypass grafting some elevated blood sugars  Carotid stenosis  2.  3.  4.      Patient Active Problem List   Diagnosis    Type 2 diabetes mellitus ([facility] [address]    Essential hypertension    Coronary artery disease    Dyslipidemia    Accelerating angina (HCC)    Skin cancer of nose    S/P CABG x 3    Atelectasis    Hypoxemia    Carotid artery stenosis, asymptomatic    Pneumothorax on left       Plan   1. Continue current care path patient to be seen by vascular on outpatient basis continue management of diabetes.   2.  3.  4.          MD Brynn Russell MD

## 2023-02-19 LAB
GLUCOSE BLD STRIP.AUTO-MCNC: 182 MG/DL (ref 65–100)
GLUCOSE BLD STRIP.AUTO-MCNC: 228 MG/DL (ref 65–100)
GLUCOSE BLD STRIP.AUTO-MCNC: 272 MG/DL (ref 65–100)
GLUCOSE BLD STRIP.AUTO-MCNC: 349 MG/DL (ref 65–100)
MAGNESIUM SERPL-MCNC: 2 MG/DL (ref 1.8–2.4)
POTASSIUM SERPL-SCNC: 3.6 MMOL/L (ref 3.5–5.1)
SERVICE CMNT-IMP: ABNORMAL

## 2023-02-19 PROCEDURE — 97530 THERAPEUTIC ACTIVITIES: CPT

## 2023-02-19 PROCEDURE — 84132 ASSAY OF SERUM POTASSIUM: CPT

## 2023-02-19 PROCEDURE — 6370000000 HC RX 637 (ALT 250 FOR IP): Performed by: THORACIC SURGERY (CARDIOTHORACIC VASCULAR SURGERY)

## 2023-02-19 PROCEDURE — 97110 THERAPEUTIC EXERCISES: CPT

## 2023-02-19 PROCEDURE — 6370000000 HC RX 637 (ALT 250 FOR IP): Performed by: PHYSICIAN ASSISTANT

## 2023-02-19 PROCEDURE — 2140000001 HC CVICU INTERMEDIATE R&B

## 2023-02-19 PROCEDURE — 36415 COLL VENOUS BLD VENIPUNCTURE: CPT

## 2023-02-19 PROCEDURE — 2580000003 HC RX 258: Performed by: PHYSICIAN ASSISTANT

## 2023-02-19 PROCEDURE — 82962 GLUCOSE BLOOD TEST: CPT

## 2023-02-19 PROCEDURE — 83735 ASSAY OF MAGNESIUM: CPT

## 2023-02-19 RX ORDER — INSULIN GLARGINE 100 [IU]/ML
16 INJECTION, SOLUTION SUBCUTANEOUS NIGHTLY
Status: DISCONTINUED | OUTPATIENT
Start: 2023-02-19 | End: 2023-02-20 | Stop reason: HOSPADM

## 2023-02-19 RX ORDER — SENNA AND DOCUSATE SODIUM 50; 8.6 MG/1; MG/1
2 TABLET, FILM COATED ORAL DAILY PRN
Status: DISCONTINUED | OUTPATIENT
Start: 2023-02-19 | End: 2023-02-20 | Stop reason: HOSPADM

## 2023-02-19 RX ORDER — GUAIFENESIN 600 MG/1
1200 TABLET, EXTENDED RELEASE ORAL 2 TIMES DAILY
Status: DISCONTINUED | OUTPATIENT
Start: 2023-02-19 | End: 2023-02-20 | Stop reason: HOSPADM

## 2023-02-19 RX ADMIN — CARVEDILOL 3.12 MG: 3.12 TABLET, FILM COATED ORAL at 21:34

## 2023-02-19 RX ADMIN — ASPIRIN 81 MG: 81 TABLET, COATED ORAL at 08:55

## 2023-02-19 RX ADMIN — POTASSIUM CHLORIDE 10 MEQ: 750 TABLET, EXTENDED RELEASE ORAL at 08:55

## 2023-02-19 RX ADMIN — INSULIN GLARGINE 16 UNITS: 100 INJECTION, SOLUTION SUBCUTANEOUS at 21:35

## 2023-02-19 RX ADMIN — OXYCODONE AND ACETAMINOPHEN 1 TABLET: 5; 325 TABLET ORAL at 06:46

## 2023-02-19 RX ADMIN — ROSUVASTATIN CALCIUM 40 MG: 20 TABLET, COATED ORAL at 21:34

## 2023-02-19 RX ADMIN — INSULIN LISPRO 6 UNITS: 100 INJECTION, SOLUTION INTRAVENOUS; SUBCUTANEOUS at 16:45

## 2023-02-19 RX ADMIN — SODIUM CHLORIDE, PRESERVATIVE FREE 10 ML: 5 INJECTION INTRAVENOUS at 08:56

## 2023-02-19 RX ADMIN — AMIODARONE HYDROCHLORIDE 200 MG: 200 TABLET ORAL at 21:34

## 2023-02-19 RX ADMIN — FAMOTIDINE 20 MG: 20 TABLET ORAL at 21:34

## 2023-02-19 RX ADMIN — INSULIN LISPRO 4 UNITS: 100 INJECTION, SOLUTION INTRAVENOUS; SUBCUTANEOUS at 08:56

## 2023-02-19 RX ADMIN — INSULIN LISPRO 1 UNITS: 100 INJECTION, SOLUTION INTRAVENOUS; SUBCUTANEOUS at 21:35

## 2023-02-19 RX ADMIN — OXYCODONE AND ACETAMINOPHEN 1 TABLET: 5; 325 TABLET ORAL at 01:43

## 2023-02-19 RX ADMIN — Medication 6 MG: at 21:34

## 2023-02-19 RX ADMIN — Medication 1 AMPULE: at 21:41

## 2023-02-19 RX ADMIN — FINASTERIDE 5 MG: 5 TABLET, FILM COATED ORAL at 08:55

## 2023-02-19 RX ADMIN — GUAIFENESIN 1200 MG: 600 TABLET ORAL at 21:38

## 2023-02-19 RX ADMIN — FUROSEMIDE 40 MG: 40 TABLET ORAL at 08:55

## 2023-02-19 RX ADMIN — AMIODARONE HYDROCHLORIDE 200 MG: 200 TABLET ORAL at 08:55

## 2023-02-19 RX ADMIN — SODIUM CHLORIDE, PRESERVATIVE FREE 10 ML: 5 INJECTION INTRAVENOUS at 21:35

## 2023-02-19 RX ADMIN — POTASSIUM CHLORIDE 20 MEQ: 1500 TABLET, EXTENDED RELEASE ORAL at 08:55

## 2023-02-19 RX ADMIN — POTASSIUM CHLORIDE 20 MEQ: 1500 TABLET, EXTENDED RELEASE ORAL at 12:28

## 2023-02-19 RX ADMIN — INSULIN LISPRO 8 UNITS: 100 INJECTION, SOLUTION INTRAVENOUS; SUBCUTANEOUS at 12:28

## 2023-02-19 RX ADMIN — FAMOTIDINE 20 MG: 20 TABLET ORAL at 08:55

## 2023-02-19 RX ADMIN — CARVEDILOL 3.12 MG: 3.12 TABLET, FILM COATED ORAL at 08:55

## 2023-02-19 RX ADMIN — Medication 1 AMPULE: at 09:53

## 2023-02-19 RX ADMIN — OXYCODONE AND ACETAMINOPHEN 1 TABLET: 5; 325 TABLET ORAL at 21:34

## 2023-02-19 ASSESSMENT — PAIN SCALES - GENERAL
PAINLEVEL_OUTOF10: 0
PAINLEVEL_OUTOF10: 1
PAINLEVEL_OUTOF10: 0
PAINLEVEL_OUTOF10: 0
PAINLEVEL_OUTOF10: 7
PAINLEVEL_OUTOF10: 0
PAINLEVEL_OUTOF10: 7
PAINLEVEL_OUTOF10: 0

## 2023-02-19 ASSESSMENT — PAIN DESCRIPTION - ORIENTATION
ORIENTATION: MID
ORIENTATION: MID

## 2023-02-19 ASSESSMENT — PAIN DESCRIPTION - LOCATION
LOCATION: INCISION
LOCATION: BACK
LOCATION: INCISION

## 2023-02-19 ASSESSMENT — PAIN DESCRIPTION - DESCRIPTORS
DESCRIPTORS: ACHING

## 2023-02-19 NOTE — DISCHARGE SUMMARY
Discharge Summary     Patient ID:  Neto Flores  [id number]  89 y.o.  1950    Admit date: 2/15/2023    Discharge date:  2/20/2023    Admitting Physician: Raúl May MD     Discharge Physician: Celi Fragoso. JOAN Weiss/Dr. Feliciano Stock    Admission Diagnoses: Atherosclerotic heart disease of native coronary artery with unstable angina pectoris ([address] [I25.110]  Accelerating angina ([address] [I20.0]  Coronary artery disease (CAD) excluded [Z03.89]    Discharge Diagnoses:   Patient Active Problem List    Diagnosis Date Noted    Pneumothorax on left 02/16/2023    S/P CABG x 3 02/15/2023    Atelectasis 02/15/2023    Hypoxemia 02/15/2023    Carotid artery stenosis, asymptomatic 02/15/2023    Skin cancer of nose 02/09/2023    Type 2 diabetes mellitus ([address] 12/06/2022    Essential hypertension 12/06/2022    Coronary artery disease 12/06/2022    Dyslipidemia 12/06/2022    Accelerating angina ([facility] [address] 12/07/2022       Procedures this admission:   Coronary Artery Bypass Graft Surgery  Consults: Pulmonary/Intensive Care, Vascular Surgery    Hospital Course: Patient presented for elective CABG surgery. He has a history of CAD with previous stents placed in 2012 in [place]. For at least a year, he had intermittent chest pain, fatigue and dyspnea. He was scheduled for a heart cath the end of 2022 but this was postponed due to URI. 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 He underwent LHC that showed severe multivessel disease including LM stenosis and totally occluded RCA. LV EF was 45 to 50%. CABG surgery was planned. Pre-op carotid US showed 70-99% of the right internal carotid. He underwent CABG x 3 with LIMA to the LAD, reverse SVG to the RCA and reverse SVG to the Intermediate coronary on 2/15/23. He did well post operatively and was transferred to CV stepdown on POD 1. He was weaned off of O2.  He progressed well with PT. CTA of the neck showed a 75% stenosis at the origin of right internal carotid artery. He will follow up with vascular surgery on outpatient basis. He remained in sinus rhythm. The morning of 2/20/23, patient was feeling well and ambulating without any symptoms. Patient was determined stable and ready for discharge. Patient was instructed on the importance of medication compliance and outpatient follow up. For maximized medical therapy for CAD, patient will continue ASA, BB, ARB, and statin as well. He was resumed on lower dose ARB at discharge. The patient will have close transitional care follow up with [facility] Dr. Germain Titus on February 27th at 1:45pm [facility]). The patient will follow up with Dr. Ning Winston on March 14th at 2:10pm and has been referred to cardiac rehab. DISPOSITION: The patient is being discharged home with home health services in stable condition on a low saturated fat, low cholesterol and low salt diet. The patient is instructed to advance activities as tolerated to the limit of fatigue or shortness of breath. The patient is instructed to avoid all heavy lifting or activities that strain the chest or upper arm muscles. Strenuous activity should be avoided. The patient is instructed to watch for signs of infection which include: increasing area of redness, fever or purulent drainage at the incision site. The patient is instructed to call the office or return to the ER for immediate evaluation for any shortness of breath or chest pain not relieved by NTG.     Discharge Exam: BP (!) 169/80   Pulse 83   Temp 97.7 °F (36.5 °C) (Oral)   Resp 18   Ht 5' 10\" (1.778 m)   Wt 152 lb 3.2 oz (69 kg)   SpO2 95%   BMI 21.84 kg/m²       Physical Exam:  General: Well Developed, Well Nourished, No Acute Distress, Alert & Oriented  Neck: supple, no JVD  Heart: S1S2 with RRR without murmurs or gallops  Lungs: Clear throughout auscultation bilaterally without adventitious sounds  Abd: soft, nontender, nondistended, with good bowel sounds  Ext: warm, no edema  Sternal incision: clean, dry, and intact  Skin: warm and dry      Recent Results (from the past 24 hour(s))   POCT Glucose    Collection Time: 02/19/23 11:46 AM   Result Value Ref Range    POC Glucose 349 (H) 65 - 100 mg/dL    Performed by: Marisela    POCT Glucose    Collection Time: 02/19/23  3:14 PM   Result Value Ref Range    POC Glucose 272 (H) 65 - 100 mg/dL    Performed by:  Marisela    POCT Glucose    Collection Time: 02/19/23  8:13 PM   Result Value Ref Range    POC Glucose 182 (H) 65 - 100 mg/dL    Performed by: Jennifer    Potassium    Collection Time: 02/20/23  5:05 AM   Result Value Ref Range    Potassium 3.9 3.5 - 5.1 mmol/L   Magnesium    Collection Time: 02/20/23  5:05 AM   Result Value Ref Range    Magnesium 2.2 1.8 - 2.4 mg/dL   POCT Glucose    Collection Time: 02/20/23  6:19 AM   Result Value Ref Range    POC Glucose 192 (H) 65 - 100 mg/dL    Performed by: Bella Christian          Patient Instructions:      Medication List        CHANGE how you take these medications      acetaminophen 325 MG tablet  Commonly known as: TYLENOL  Take 2 tablets by mouth every 6 hours as needed for Pain  What changed:   when to take this  reasons to take this     losartan 50 MG tablet  Commonly known as: COZAAR  Take 1 tablet by mouth daily  What changed:   medication strength  how much to take            CONTINUE taking these medications      alogliptin 12.5 MG Tabs tablet  Commonly known as: NESINA     aspirin 81 MG EC tablet     Co Q 10 10 MG Caps     cyanocobalamin 1000 MCG tablet     ferrous sulfate 325 (65 Fe) MG tablet  Commonly known as: IRON 325     finasteride 5 MG tablet  Commonly known as: PROSCAR     glipiZIDE 10 MG tablet  Commonly known as: GLUCOTROL     HYDROcodone-acetaminophen  MG per tablet  Commonly known as: NORCO     insulin glargine 100 UNIT/ML injection pen  Commonly known as: LANTUS;BASAGLAR     magnesium oxide 400 MG tablet  Commonly known as: MAG-OX     metFORMIN 1000 MG tablet  Commonly known as: GLUCOPHAGE     metoprolol succinate 25 MG extended release tablet  Commonly known as: TOPROL XL  Take 1 tablet by mouth daily     multivitamin with minerals tablet     naloxone 4 MG/0.1ML Liqd nasal spray     nitroGLYCERIN 0.4 MG SL tablet  Commonly known as: NITROSTAT  Place 1 tablet under the tongue every 5 minutes as needed for Chest pain (X 3 DOSES, THEN CALL 911) up to max of 3 total doses. If no relief after 1 dose, call 911. rosuvastatin 40 MG tablet  Commonly known as: CRESTOR     Sure Comfort Pen Needles 32G X 6 MM Misc  Generic drug: Insulin Pen Needle            STOP taking these medications      amiodarone 200 MG tablet  Commonly known as: CORDARONE     isosorbide mononitrate 30 MG extended release tablet  Commonly known as: IMDUR               Where to Get Your Medications        These medications were sent to [facility]  [facility], [address] [address]      Phone: [phone]   losartan 50 MG tablet       Information about where to get these medications is not yet available    Ask your nurse or doctor about these medications  acetaminophen 325 MG tablet            Signed:  Millicent Mancilla.  BRENDA Weiss  2/20/2023  9:29 AM

## 2023-02-19 NOTE — PROGRESS NOTES
Progress Note    2023 9:44 AM          POD#  4 postoperative day 4 from a three-vessel coronary artery bypass grafting    Subjective:  Mr. Sarah Peck continues to make satisfactory progress and is without complaints    OBJECTIVE:    PULSE OXIMETRY RANGE: SpO2  Av.2 %  Min: 94 %  Max: 96 %  SUPPLEMENTAL O2: O2 Flow Rate (L/min): 6 L/min   Vital Signs: BP (!) 152/71   Pulse 77   Temp 97.4 °F (36.3 °C) (Temporal)   Resp 18   Ht 5' 10\" (1.778 m)   Wt 162 lb 3.2 oz (73.6 kg)   SpO2 95%   BMI 23.27 kg/m²    Temperature Range:   Temp: 97.4 °F (36.3 °C)   Temp  Av.3 °F (36.8 °C)  Min: 97.4 °F (36.3 °C)  Max: 99 °F (37.2 °C)      Exam:   General appearance:   Neck:   Lungs: On examination lungs there are some fine crackles in the bases suggestive of possible atelectasis  Sternum: Incision is dry and intact  Heart: Patient is in normal sinus rhythm heart rate 98 patient has been somewhat hypotensive postoperatively with systolics 156/19  Abdomen: abdomenis soft and nontender  Extremities:   Leg Wounds:                  Rhythm: Normal sinus rhythm    Labs:   ABG:  No results found for: PHART, PO2ART, GIP9LQB, V3DOFKVN, QZV4MKE, THGBART, GQU3LHY, BEART  CBC:   Recent Labs     23  1109 23  0627   WBC 11.8* 10.1   HGB 12.1* 11.8*   HCT 36.8* 35.1*   MCV 89.3 88.4    196     BMP:   Recent Labs     23  1109 23  0627 23  0518     --   --    K 4.6 3.5 3.6     --   --    CO2 26  --   --    BUN 19  --   --    CREATININE 1.20  --   --      PT/INR: No results for input(s): PROTIME, INR in the last 72 hours. APTT: No results for input(s): APTT in the last 72 hours. Chest X-Ray: Chest x-ray 2023 showed some atelectasis left greater than right less appears to be relatively minor. CT:  I/O last 3 completed shifts: In: 480 [P.O.:480]  Out: -       Air Leak:   I/O last 3 completed shifts:   In: 480 [P.O.:480]  Out: -     Scheduled Meds:    insulin glargine  10 Units SubCUTAneous Nightly    finasteride  5 mg Oral Daily    sodium chloride flush  5-40 mL IntraVENous 2 times per day    famotidine  20 mg Oral BID    Or    famotidine (PEPCID) injection  20 mg IntraVENous BID    insulin lispro  0-12 Units SubCUTAneous TID     insulin lispro  0-6 Units SubCUTAneous Nightly    alcohol 62%  1 ampule Topical Q12H    aspirin  81 mg Oral Daily    amiodarone  200 mg Oral BID    carvedilol  3.125 mg Oral BID    rosuvastatin  40 mg Oral Nightly       Continuous Infusions:    dextrose           Assessment  Is postoperative day #4 from coronary artery bypass grafting making satisfactory progress  2. And has diabetes type 2 and has had elevated blood sugars in the postoperative period  3.  4.      Patient Active Problem List   Diagnosis    Type 2 diabetes mellitus ([facility] [address]    Essential hypertension    Coronary artery disease    Dyslipidemia    Accelerating angina (HCC)    Skin cancer of nose    S/P CABG x 3    Atelectasis    Hypoxemia    Carotid artery stenosis, asymptomatic    Pneumothorax on left       Plan   1. Continue current care path  2. More aggressive control of diabetes and elevated glucose.   3.  4.          MD Tricia Ulrich MD

## 2023-02-19 NOTE — PROGRESS NOTES
ACUTE PHYSICAL THERAPY GOALS:   (Developed with and agreed upon by patient and/or caregiver. )  LTG:  (1.)Mr. Leo Naik will move from supine to sit and sit to supine , scoot up and down, and roll side to side in bed with INDEPENDENT within 7 treatment day(s). (2.)Mr. Leo Naik will transfer from bed to chair and chair to bed with STAND BY ASSIST using the least restrictive device within 7 treatment day(s). (3.)Mr. Leo Naik will ambulate with STAND BY ASSIST for 500+ feet with the least restrictive device within 7 treatment day(s) while maintaining normal vital signs. (4.)Mr. Leo Naik will be independent in HEP for B LE strengthening and activity tolerance within 7 treatment days. ________________________________________________________________________________________________     PHYSICAL THERAPY: Daily Note AM   (Link to Caseload Tracking: PT Visit Days : 4  Time In/Out PT Charge Capture  Rehab Caseload Tracker  Orders    Aravind Burrell is a 75 y.o. male   PRIMARY DIAGNOSIS: S/P CABG x 3  Atherosclerotic heart disease of native coronary artery with unstable angina pectoris ([facility] [address] [I25.110]  Accelerating angina (HCC) [I20.0]  Coronary artery disease (CAD) excluded [Z03.89]  Procedure(s) (LRB):  CABG CORONARY ARTERY BYPASS (CABGX3), LIMA, ENDOSCOPIC GREATER SAPHENOUS VEIN HARVEST LEFT LEG, TRANSEASOPHAGEAL ECHOCARDIOGRAM (N/A)  4 Days Post-Op  Inpatient: Payor: Priscilla Solis / Plan: Priscilla Solis / Product Type: *No Product type* /     ASSESSMENT:     REHAB RECOMMENDATIONS:   Recommendation to date pending progress:  Setting:  Home Health Therapy    Equipment:    To Be Determined     ASSESSMENT:  Mr. Leo Naik was sitting up in chair on arrival. He is agreeable to PT and willing to work with PT. His balance is better today and was able to ambulate further without an assistive device. BLE exercises performed while up in chair. Pt hopes to go home tomorrow.       SUBJECTIVE:   Mr. Leo Naik states, \"I feel better\"     Social/Functional Lives With: Spouse  Type of Home: House  Home Layout: One level  Home Access: Ramped entrance  ADL Assistance: Independent  Ambulation Assistance: Independent  Active : Yes  Mode of Transportation: Car  OBJECTIVE:     PAIN: Kusum Bennett / O2: Meghana Christianson / Nela Murphy / Robel Hebertker:   Pre Treatment:  not rated         Post Treatment:  not rated Vitals        Oxygen    Telemetry     RESTRICTIONS/PRECAUTIONS:  Position Activity Restriction  Sternal Precautions: No Pushing, No Pulling, 5# Lifting Restrictions     MOBILITY: I Mod I S SBA CGA Min Mod Max Total  NT x2 Comments:   Bed Mobility    Rolling [] [] [] [] [] [] [] [] [] [x] [] In chair   Supine to Sit [] [] [] [] [] [] [] [] [] [x] []    Scooting [] [] [] [] [] [] [] [] [] [x] []    Sit to Supine [] [] [] [] [] [] [] [] [] [x] []    Transfers    Sit to Stand [] [] [] [x] [] [] [] [] [] [] []    Bed to Chair [] [] [] [] [] [] [] [] [] [x] []    Stand to Sit [] [] [] [x] [] [] [] [] [] [] []     [] [] [] [] [] [] [] [] [] [] []    I=Independent, Mod I=Modified Independent, S=Supervision, SBA=Standby Assistance, CGA=Contact Guard Assistance,   Min=Minimal Assistance, Mod=Moderate Assistance, Max=Maximal Assistance, Total=Total Assistance, NT=Not Tested    BALANCE: Good Fair+ Fair Fair- Poor NT Comments   Sitting Static [x] [] [] [] [] []    Sitting Dynamic [x] [] [] [] [] []              Standing Static [] [x] [] [] [] []    Standing Dynamic [] [x] [] [] [] []      GAIT: I Mod I S SBA CGA Min Mod Max Total  NT x2 Comments:   Level of Assistance [] [] [] [x] [x] [] [] [] [] [] []    Distance 600  feet    DME None    Gait Quality Antalgic, Decreased yg , Decreased step clearance, and Decreased step length    Weightbearing Status      Stairs      I=Independent, Mod I=Modified Independent, S=Supervision, SBA=Standby Assistance, CGA=Contact Guard Assistance,   Min=Minimal Assistance, Mod=Moderate Assistance, Max=Maximal Assistance, Total=Total Assistance, NT=Not Tested    PLAN:   FREQUENCY AND DURATION: BID for duration of hospital stay or until stated goals are met, whichever comes first.    TREATMENT:   TREATMENT:   Therapeutic Activity (15 Minutes): Therapeutic activity included Scooting, Transfer Training, Ambulation on level ground, Sitting balance , and Standing balance to improve functional Activity tolerance, Balance, Coordination, Mobility, Strength, and ROM. Therapeutic Exercise (10 Minutes): Therapeutic exercises noted below to improve functional activity tolerance, AROM, strength, and mobility.      TREATMENT GRID:     Date:  2/17/23 Date:  2/18/23 Date:  2/19/23   ACTIVITY/EXERCISE AM PM AM PM AM PM   LAQ 15  15 B  15 B    Shoulder shrugs 15  15 B  15 B    Gluteal sets 15   15 B  15 B    marching 15  15 B  15 B    Ankle pumps 15  15 B  15 B    abduction 15  15 B  15 B             B = bilateral; AA = active assistive; A = active; P = passive    AFTER TREATMENT PRECAUTIONS: Bed/Chair Locked, Call light within reach, Chair, Needs within reach, and RN notified    INTERDISCIPLINARY COLLABORATION:  RN/ PCT and PT/ PTA    EDUCATION:  review POC and importance of mobility in the recovery process     TIME IN/OUT:  Time In: 1035  Time Out: 1100  Minutes: [address] PTA

## 2023-02-20 ENCOUNTER — APPOINTMENT (OUTPATIENT)
Dept: GENERAL RADIOLOGY | Age: 73
DRG: 236 | End: 2023-02-20
Attending: THORACIC SURGERY (CARDIOTHORACIC VASCULAR SURGERY)
Payer: OTHER GOVERNMENT

## 2023-02-20 VITALS
BODY MASS INDEX: 21.79 KG/M2 | OXYGEN SATURATION: 95 % | TEMPERATURE: 97.7 F | DIASTOLIC BLOOD PRESSURE: 68 MMHG | WEIGHT: 152.2 LBS | HEART RATE: 83 BPM | RESPIRATION RATE: 18 BRPM | SYSTOLIC BLOOD PRESSURE: 141 MMHG | HEIGHT: 70 IN

## 2023-02-20 LAB
GLUCOSE BLD STRIP.AUTO-MCNC: 192 MG/DL (ref 65–100)
MAGNESIUM SERPL-MCNC: 2.2 MG/DL (ref 1.8–2.4)
POTASSIUM SERPL-SCNC: 3.9 MMOL/L (ref 3.5–5.1)
SERVICE CMNT-IMP: ABNORMAL

## 2023-02-20 PROCEDURE — 71045 X-RAY EXAM CHEST 1 VIEW: CPT

## 2023-02-20 PROCEDURE — 99024 POSTOP FOLLOW-UP VISIT: CPT | Performed by: PHYSICIAN ASSISTANT

## 2023-02-20 PROCEDURE — 6370000000 HC RX 637 (ALT 250 FOR IP): Performed by: THORACIC SURGERY (CARDIOTHORACIC VASCULAR SURGERY)

## 2023-02-20 PROCEDURE — 36415 COLL VENOUS BLD VENIPUNCTURE: CPT

## 2023-02-20 PROCEDURE — 84132 ASSAY OF SERUM POTASSIUM: CPT

## 2023-02-20 PROCEDURE — 6370000000 HC RX 637 (ALT 250 FOR IP): Performed by: PHYSICIAN ASSISTANT

## 2023-02-20 PROCEDURE — 82962 GLUCOSE BLOOD TEST: CPT

## 2023-02-20 PROCEDURE — 83735 ASSAY OF MAGNESIUM: CPT

## 2023-02-20 RX ORDER — LOSARTAN POTASSIUM 50 MG/1
50 TABLET ORAL DAILY
Qty: 30 TABLET | Refills: 3 | Status: SHIPPED | OUTPATIENT
Start: 2023-02-20

## 2023-02-20 RX ORDER — ACETAMINOPHEN 325 MG/1
650 TABLET ORAL EVERY 6 HOURS PRN
Qty: 120 TABLET | Refills: 3 | COMMUNITY
Start: 2023-02-20

## 2023-02-20 RX ADMIN — AMIODARONE HYDROCHLORIDE 200 MG: 200 TABLET ORAL at 08:33

## 2023-02-20 RX ADMIN — FINASTERIDE 5 MG: 5 TABLET, FILM COATED ORAL at 08:33

## 2023-02-20 RX ADMIN — CARVEDILOL 3.12 MG: 3.12 TABLET, FILM COATED ORAL at 08:33

## 2023-02-20 RX ADMIN — INSULIN LISPRO 2 UNITS: 100 INJECTION, SOLUTION INTRAVENOUS; SUBCUTANEOUS at 08:33

## 2023-02-20 RX ADMIN — FAMOTIDINE 20 MG: 20 TABLET ORAL at 08:33

## 2023-02-20 RX ADMIN — ASPIRIN 81 MG: 81 TABLET, COATED ORAL at 08:32

## 2023-02-20 RX ADMIN — GUAIFENESIN 1200 MG: 600 TABLET ORAL at 08:33

## 2023-02-20 RX ADMIN — OXYCODONE AND ACETAMINOPHEN 1 TABLET: 5; 325 TABLET ORAL at 08:38

## 2023-02-20 ASSESSMENT — PAIN SCALES - GENERAL
PAINLEVEL_OUTOF10: 5
PAINLEVEL_OUTOF10: 0
PAINLEVEL_OUTOF10: 2

## 2023-02-20 ASSESSMENT — PAIN DESCRIPTION - DESCRIPTORS: DESCRIPTORS: ACHING

## 2023-02-20 ASSESSMENT — PAIN DESCRIPTION - LOCATION: LOCATION: CHEST

## 2023-02-20 ASSESSMENT — PAIN DESCRIPTION - ORIENTATION: ORIENTATION: ANTERIOR

## 2023-02-20 NOTE — PROGRESS NOTES
Discharge instructions, follow up appointments and prescriptions reviewed with patient and family. Both verbalize understanding. All personal belongings taken with patient. Family member will drive patient home. Patient escorted to discharge area via wheelchair. Patient is stable at discharge. PIV and CT sutures removed.

## 2023-02-20 NOTE — CARE COORDINATION
Pt with discharge orders this day. Pt to return home with spouse and  family support. Home health arranged and updated clinicals to be sent. No additional CM needs at discharge. 02/16/23 1145   Service Assessment   Patient Orientation Alert and Oriented   Cognition Alert   History Provided By Patient   Primary Caregiver Self   Support Systems Spouse/Significant Other   PCP Verified by CM Yes   Last Visit to PCP Within last 6 months   Prior Functional Level Independent in ADLs/IADLs   Current Functional Level Other (see comment)  (TBD by clinical team)   Can patient return to prior living arrangement Yes   Ability to make needs known: Good   Family able to assist with home care needs: Yes   Would you like for me to discuss the discharge plan with any other family members/significant others, and if so, who? Yes   Social/Functional History   Lives With Spouse   Type of [address] One level   Home Access Ramped entrance   [address] [place] [place] [address] Yes   Mode of Transportation Car   Discharge Planning   Type of [address] PT;Nursing Services   Patient expects to be discharged to: [address]charge   [address]   Confirm Follow Up Transport Family   Condition of Participation: Discharge Planning   The Plan for Transition of Care is related to the following treatment goals: Home with family support   The Patient and/or Patient Representative was provided with a Choice of Provider? Patient   The Patient and/Or Patient Representative agree with the Discharge Plan? Yes   Freedom of Choice list was provided with basic dialogue that supports the patient's individualized plan of care/goals, treatment preferences, and shares the quality data associated with the providers?   Yes

## 2023-02-21 ENCOUNTER — TELEPHONE (OUTPATIENT)
Dept: CARDIOLOGY CLINIC | Age: 73
End: 2023-02-21

## 2023-02-21 NOTE — TELEPHONE ENCOUNTER
Transitional Care fu after DC from [facility] 2/20/23 He underwent CABG x 3 with LIMA to the LAD, reverse SVG to the RCA and reverse SVG to the Intermediate coronary on 2/15/23. I spoke w/pt. he is doing well. Pain is controlled. He has all his meds. Incisions look good. He has heard from home health. I went over post op instructions including diet,activity,importance of med compliance,activity and fu appt. W/Dr. Quincy Olszewski on February 27th at 1:45pm (Natalie Ferrell2). All questions answered and pt.advised to call PRN.

## 2023-02-24 NOTE — PROGRESS NOTES
[facility] CARDIOLOGY  [address], [address]  PHONE: [phone]      23    NAME:  Yovani Hanks  : 1950  MRN: [id number]         SUBJECTIVE:   Yovani Hanks is a 75 y.o. male seen for a follow up visit regarding the following:     Chief Complaint   Patient presents with    Follow-Up from Hospital     cabg    Coronary Artery Disease              HPI:  Follow up  Follow-Up from Hospital (cabg) and Coronary Artery Disease   . 7 day transitional care follow up of CABG with mildly depressed EF. Incidentally diagnosed carotid artery stenosis for outpatient vascular follow up. Recovering as expected. Here with his son, Frederica Felty ?sp, has interim home health. Appropriately sore, no edema. Appetite recovering slowly. Sleeping poorly, consuming a lot of water at night for dry mouth then has to urinate frequently. PAST CARDIAC HISTORY:          NSTEMI - coronary stents x 2  Oct 2022       TMET (at [facility]) - 1.56 seconds, 102% HR, equivocal ST changes. Reproduced anginal symptoms Deconditioned. Inconclusive study  Dec 2022       [facility] - canceled d/t URI  2023       LIMA-LAD, SVG-RCA, SVG-ramus. LVEF 45-50%       CTA neck - 75% origin SANDRA             Wu CAD CHF Meds            losartan (COZAAR) 50 MG tablet (Taking)    Sig - Route: Take 1 tablet by mouth daily - Oral    rosuvastatin (CRESTOR) 40 MG tablet (Taking)    Class: Historical Med    metoprolol succinate (TOPROL XL) 25 MG extended release tablet (Taking)    Sig - Route:  Take 1 tablet by mouth daily - Oral          Key Antihyperglycemic Medications            glipiZIDE (GLUCOTROL) 10 MG tablet (Taking)    Class: Historical Med    alogliptin (NESINA) 12.5 MG TABS tablet (Taking)    Class: Historical Med    insulin glargine (LANTUS;BASAGLAR) 100 UNIT/ML injection pen (Taking)    Class: Historical Med    metFORMIN (GLUCOPHAGE) 1000 MG tablet (Taking)    Class: Historical Med                Past Medical History, Past Surgical History, Family history, Social History, and Medications were all reviewed with the patient today and updated as necessary. Prior to Admission medications    Medication Sig Start Date End Date Taking? Authorizing Provider   turmeric 500 MG CAPS Take 1,000 mg by mouth daily   Yes Historical Provider, MD   MELATONIN PO Take by mouth   Yes Historical Provider, MD   esomeprazole (NEXIUM) 40 MG delayed release capsule Take 1 capsule by mouth every morning (before breakfast) 2/27/23  Yes Janeth Fletcher MD   losartan (COZAAR) 50 MG tablet Take 1 tablet by mouth daily 2/20/23  Yes JOAN Alvares   acetaminophen (TYLENOL) 325 MG tablet Take 2 tablets by mouth every 6 hours as needed for Pain 2/20/23  Yes JOAN Villa   Multiple Vitamins-Minerals (MULTIVITAMIN WITH MINERALS) tablet Take 1 tablet by mouth daily   Yes Historical Provider, MD   Coenzyme Q10 (CO Q 10) 10 MG CAPS Take 1 capsule by mouth daily   Yes Historical Provider, MD   glipiZIDE (GLUCOTROL) 10 MG tablet Take 10 mg by mouth 2 times daily (before meals) 1/10/23  Yes Janeth Fletcher MD   alogliptin (NESINA) 12.5 MG TABS tablet Take 12.5 mg by mouth daily 1/28/21  Yes Historical Provider, MD   aspirin 81 MG EC tablet Take 81 mg by mouth daily 4/28/16  Yes Historical Provider, MD   cyanocobalamin 1000 MCG tablet Take 1,000 mcg by mouth daily   Yes Historical Provider, MD   ferrous sulfate (IRON 325) 325 (65 Fe) MG tablet Take 325 mg by mouth daily (with breakfast) 7/27/22  Yes Historical Provider, MD   finasteride (PROSCAR) 5 MG tablet Take 5 mg by mouth daily 6/21/19  Yes Historical Provider, MD   HYDROcodone-acetaminophen (NORCO)  MG per tablet Take 1 tablet by mouth in the morning and at bedtime.  11/13/22  Yes Historical Provider, MD   insulin glargine (LANTUS;BASAGLAR) 100 UNIT/ML injection pen Inject 16 Units into the skin nightly 7/27/22  Yes Historical Provider, MD   metFORMIN (GLUCOPHAGE) 1000 MG tablet Take 1,000 mg by mouth 2 times daily (with meals) 4/16/21  Yes Historical Provider, MD   rosuvastatin (CRESTOR) 40 MG tablet Take 40 mg by mouth daily   Yes Historical Provider, MD   naloxone 4 MG/0.1ML LIQD nasal spray 1 spray once as needed 10/13/21  Yes Historical Provider, MD   metoprolol succinate (TOPROL XL) 25 MG extended release tablet Take 1 tablet by mouth daily 12/7/22  Yes Miroslava Fonseca MD   nitroGLYCERIN (NITROSTAT) 0.4 MG SL tablet Place 1 tablet under the tongue every 5 minutes as needed for Chest pain (X 3 DOSES, THEN CALL 911) up to max of 3 total doses.  If no relief after 1 dose, call 911. 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  1/26/23    Chronic pain     FLORECITA-- multi    Coronary artery disease     mi 7/2012-- stents x 2  - 2012-- followed by dr Asif Dumont    Dyslipidemia     Enlarged prostate     Environmental allergies     Essential hypertension     controlled with med    Type 2 diabetes mellitus ([facility] [address]     type 2-- does not check sqbs at home--     Past Surgical History:   Procedure Laterality Date    CARDIAC PROCEDURE N/A 02/09/2023    LEFT HEART CATH / CORONARY ANGIOGRAPHY performed by Sai Heart MD at [facility] CATH LAB    COLONOSCOPY      CORONARY ARTERY BYPASS GRAFT N/A 2/15/2023    CABG CORONARY ARTERY BYPASS (CABGX3), LIMA, ENDOSCOPIC GREATER SAPHENOUS VEIN HARVEST LEFT LEG, TRANSEASOPHAGEAL ECHOCARDIOGRAM performed by Jasson Long MD at [address] [place] [address] DIAGNOSTIC CARDIAC CATH LAB PROCEDURE      FACIAL SURGERY Left 2023    MOHs on left side of nose    PTCA  2012     Family History   Problem Relation Age of Onset    Heart Attack Mother     Dementia Mother     Heart Attack Father     Other Father         choked    Other Sister         ? complications from encephalitis    No Known Problems Brother      Social History     Tobacco Use    Smoking status: Former     Packs/day: 0.25     Years: 3.00     Pack years: 0.75     Types: Cigarettes     Quit date:      Years since quittin.1     Passive exposure: Current (wife smokes in the home)    Smokeless tobacco: Never   Substance Use Topics    Alcohol use: Never       ROS:    Review of Systems   Constitutional: Positive for decreased appetite and malaise/fatigue. Cardiovascular:  Negative for chest pain. Psychiatric/Behavioral:  The patient has insomnia. PHYSICAL EXAM:   /72   Pulse 88   Ht 5' 10\" (1.778 m)   Wt 153 lb (69.4 kg)   BMI 21.95 kg/m²      Wt Readings from Last 3 Encounters:   23 153 lb (69.4 kg)   23 152 lb 3.2 oz (69 kg)   23 164 lb 9.6 oz (74.7 kg)     BP Readings from Last 3 Encounters:   23 138/72   23 (!) 141/68   23 (!) 154/82     Pulse Readings from Last 3 Encounters:   23 88   23 83   23 (!) 106           Physical Exam  Constitutional:       Appearance: Normal appearance. HENT:      Head: Normocephalic and atraumatic. Eyes:      Conjunctiva/sclera: Conjunctivae normal.   Neck:      Vascular: No carotid bruit. Cardiovascular:      Rate and Rhythm: Normal rate and regular rhythm. Heart sounds: No murmur heard. No friction rub. No gallop. Comments: Sternal and leg wounds clean and dry    Pulmonary:      Effort: No respiratory distress. Breath sounds: No wheezing or rales. Musculoskeletal:         General: No swelling. Cervical back: Neck supple.    Skin:     General: Skin is warm and dry.   Neurological:      General: No focal deficit present. Mental Status: He is alert. Psychiatric:         Mood and Affect: Mood normal.         Behavior: Behavior normal.       Medical problems and test results were reviewed with the patient today.      DATA REVIEW        BMP  Lab Results   Component Value Date/Time     02/17/2023 11:09 AM    K 3.9 02/20/2023 05:05 AM     02/17/2023 11:09 AM    CO2 26 02/17/2023 11:09 AM    BUN 19 02/17/2023 11:09 AM    CREATININE 1.20 02/17/2023 11:09 AM    GLUCOSE 312 02/17/2023 11:09 AM    CALCIUM 9.3 02/17/2023 11:09 AM      Hemoglobin A1C   Date Value Ref Range Status   02/13/2023 8.0 (H) 4.8 - 5.6 % Final     Lab Results   Component Value Date    WBC 10.1 02/18/2023    HGB 11.8 (L) 02/18/2023    HCT 35.1 (L) 02/18/2023    MCV 88.4 02/18/2023     02/18/2023      10/11/22 0934    TRIGLYCERIDES 44 - 150 mg/dL 128    Comment: Triglyceride Level Risk Classification:   1. <150 mg/dL - Normal   2. 150-199 mg/dL - Borderline High   3. 200-500 mg/dL - High   4. >500 mg/dL - Very High   CHOLESTEROL 115 - 200 mg/dL 128    Comment: Cholesterol Values:   1. 200-239 mg/dL - Borderline   2. >240 mg/dL - High   HIGH DENSITY LIPOPROTEIN 40.0 - 60.0 mg/dL 37.0 Low     Comment: The NCEP guidelines classify HDL-C levels as follows:   1. <40 mg/dL as indicative of a major risk factor for Coronary Heart Disease   2. >60 mg/dL as a negative risk factor for Coronary Heart Disease   LOW DENSITY LIPOPROTEIN <100 mg/dL 76    Comment: The NCEP guidelines classify LDL-Cholesterol levels as follows:   1. <100 mg/dL - Optimal   2. 100-129 mg/dL - Near Optimal/Above Optimal   3. 131-159 mg/dL - Borderline High   4. 160-189 mg/dL - High   5. >190 mg/dL - Very High   VERY LOW DENSITY LIPID 10 - 40 mg/dL 25.6    NON-HDL CHOLESTEROL 0.00 - 130.00 mg/dL 91.00        EKG        CXR/IMAGING        DEVICE INTERROGATION        OUTSIDE RECORDS REVIEW    Records from outside providers have been reviewed and summarized as noted in the HPI, past history and data review sections of this note, and reviewed with patient. .       ASSESSMENT and Lacey Course was seen today for follow-up from hospital and coronary artery disease. Diagnoses and all orders for this visit:    Accelerating angina ([address]    S/P CABG x 3  -     External Referral To Cardiac Rehab    Asymptomatic stenosis of right carotid artery    Dyslipidemia    Coronary artery disease involving native coronary artery of native heart with unstable angina pectoris ([address]    Essential hypertension    Type 2 diabetes mellitus without complication, with long-term current use of insulin ([address]    Other orders  -     esomeprazole (NEXIUM) 40 MG delayed release capsule; Take 1 capsule by mouth every morning (before breakfast)        IMPRESSION:    Recovering as expected post op. Cardiac rehab referral sent to [facility] where he lives, encouraged participation    Intolerant of SGLT2i therapy, remains on current therapy managed by the [facility]. Could do with some nutrition counseling at rehab, eating Sugar Pops during the night when he's hungry. LDL near goal on current therapy, continue to monitor, work on diet and exercise as recovery allows    Has vascular follow up for asymptomatic carotid artery stenosis    BP generally well controlled, continue to monitor at rehab        Return in about 3 months (around 5/27/2023). Thank you for allowing me to participate in this patient's care. Please call or contact me if there are any questions or concerns regarding the above.       Eve Bain MD  02/27/23  2:59 PM

## 2023-02-27 ENCOUNTER — OFFICE VISIT (OUTPATIENT)
Dept: CARDIOLOGY CLINIC | Age: 73
End: 2023-02-27

## 2023-02-27 VITALS
HEIGHT: 70 IN | WEIGHT: 153 LBS | SYSTOLIC BLOOD PRESSURE: 138 MMHG | HEART RATE: 88 BPM | BODY MASS INDEX: 21.9 KG/M2 | DIASTOLIC BLOOD PRESSURE: 72 MMHG

## 2023-02-27 DIAGNOSIS — E11.9 TYPE 2 DIABETES MELLITUS WITHOUT COMPLICATION, WITH LONG-TERM CURRENT USE OF INSULIN (HCC): ICD-10-CM

## 2023-02-27 DIAGNOSIS — I10 ESSENTIAL HYPERTENSION: ICD-10-CM

## 2023-02-27 DIAGNOSIS — I65.21 ASYMPTOMATIC STENOSIS OF RIGHT CAROTID ARTERY: ICD-10-CM

## 2023-02-27 DIAGNOSIS — I20.0 ACCELERATING ANGINA (HCC): Primary | ICD-10-CM

## 2023-02-27 DIAGNOSIS — Z95.1 S/P CABG X 3: ICD-10-CM

## 2023-02-27 DIAGNOSIS — Z79.4 TYPE 2 DIABETES MELLITUS WITHOUT COMPLICATION, WITH LONG-TERM CURRENT USE OF INSULIN (HCC): ICD-10-CM

## 2023-02-27 DIAGNOSIS — E78.5 DYSLIPIDEMIA: ICD-10-CM

## 2023-02-27 DIAGNOSIS — I25.110 CORONARY ARTERY DISEASE INVOLVING NATIVE CORONARY ARTERY OF NATIVE HEART WITH UNSTABLE ANGINA PECTORIS (HCC): ICD-10-CM

## 2023-02-27 RX ORDER — VIT C/B6/B5/MAGNESIUM/HERB 173 50-5-6-5MG
1000 CAPSULE ORAL DAILY
COMMUNITY

## 2023-02-27 RX ORDER — ESOMEPRAZOLE MAGNESIUM 40 MG/1
40 CAPSULE, DELAYED RELEASE ORAL
Qty: 30 CAPSULE | Refills: 1 | Status: SHIPPED | OUTPATIENT
Start: 2023-02-27

## 2023-02-27 NOTE — PATIENT INSTRUCTIONS
Patient Education        Learning About Coronary Artery Disease (CAD)  What is coronary artery disease? Coronary artery disease is a condition that occurs when plaque builds up in the arteries that bring oxygen-rich blood to your heart. Plaque is a fatty substance made of cholesterol, calcium, and other substances in the blood. This process is called hardening of the arteries, or atherosclerosis. What happens when you have coronary artery disease? Plaque may narrow the coronary arteries. Narrowed arteries cause poor blood flow. This can lead to angina symptoms such as chest pain or discomfort. If blood flow is completely blocked, you could have a heart attack. You can slow and reduce the risk of future problems by making changes in your lifestyle. These include quitting smoking and eating heart-healthy foods. Treatment, along with changes in your lifestyle, can help you live a longer and healthier life. How can you prevent coronary artery disease? Do not smoke. It may be the best thing you can do to prevent coronary artery disease. If you need help quitting, talk to your doctor about stop-smoking programs and medicines. These can increase your chances of quitting for good. Be active. Try to do moderate activity at least 2½ hours a week. Or try to do vigorous activity at least 1¼ hours a week. You may want to walk or try other activities, such as running, swimming, cycling, or playing tennis or team sports. Eat heart-healthy foods. Eat more fruits and vegetables and less food that contains saturated and trans fats. Limit alcohol, sodium, and sweets. Stay at a healthy weight. Lose weight if you need to. Manage other health problems such as diabetes, high blood pressure, and high cholesterol. How is coronary artery disease treated? Your doctor will suggest that you make lifestyle changes. For example, your doctor may ask you to eat healthy foods, quit smoking, lose extra weight, and be more active.   You will take medicines that help prevent a heart attack. Your doctor may suggest a procedure to open narrowed or blocked arteries. This is called angioplasty. Or your doctor may suggest using healthy blood vessels to create detours around narrowed or blocked arteries. This is called bypass surgery. Follow-up care is a key part of your treatment and safety. Be sure to make and go to all appointments, and call your doctor if you are having problems. It's also a good idea to know your test results and keep a list of the medicines you take. Where can you learn more? Go to http://www.woods.com/ and enter C643 to learn more about \"Learning About Coronary Artery Disease (CAD). \"  Current as of: September 7, 2022               Content Version: 13.5  © 2006-2022 Healthwise, [employer]. Care instructions adapted under license by [facility] ([place] [facility]). If you have questions about a medical condition or this instruction, always ask your healthcare professional. [place] [address] any warranty or liability for your use of this information. Patient Education        Learning About the Mediterranean Diet  What is the [address]? The Mediterranean diet is a style of eating rather than a diet plan. It features foods eaten in [place], [place], [place] and [place], and other countries along the [place]. It emphasizes eating foods like fish, fruits, vegetables, beans, high-fiber breads and whole grains, nuts, and olive oil. This style of eating includes limited red meat, cheese, and sweets. Why choose the Mediterranean diet? A Mediterranean-style diet may improve heart health. It contains more fat than other heart-healthy diets. But the fats are mainly from nuts, unsaturated oils (such as fish oils and olive oil), and certain nut or seed oils (such as canola, soybean, or flaxseed oil). These fats may help protect the heart and blood vessels.   How can you get started on the Mediterranean diet? Here are some things you can do to switch to a more Mediterranean way of eating. What to eat  Eat a variety of fruits and vegetables each day, such as grapes, blueberries, tomatoes, broccoli, peppers, figs, olives, spinach, eggplant, beans, lentils, and chickpeas. Eat a variety of whole-grain foods each day, such as oats, brown rice, and whole wheat bread, pasta, and couscous. Eat fish at least 2 times a week. Try tuna, salmon, mackerel, lake trout, herring, or sardines. Eat moderate amounts of low-fat dairy products, such as milk, cheese, or yogurt. Eat moderate amounts of poultry and eggs. Choose healthy (unsaturated) fats, such as nuts, olive oil, and certain nut or seed oils like canola, soybean, and flaxseed. Limit unhealthy (saturated) fats, such as butter, palm oil, and coconut oil. And limit fats found in animal products, such as meat and dairy products made with whole milk. Try to eat red meat only a few times a month in very small amounts. Limit sweets and desserts to only a few times a week. This includes sugar-sweetened drinks like soda. The Mediterranean diet may also include red wine with your meal--1 glass each day for women and up to 2 glasses a day for men. Tips for eating at home  Use herbs, spices, garlic, lemon zest, and citrus juice instead of salt to add flavor to foods. Add avocado slices to your sandwich instead of patterson. Have fish for lunch or dinner instead of red meat. Brush the fish with olive oil, and broil or grill it. Sprinkle your salad with seeds or nuts instead of cheese. Cook with olive or canola oil instead of butter or oils that are high in saturated fat. Switch from 2% milk or whole milk to 1% or fat-free milk. Dip raw vegetables in a vinaigrette dressing or hummus instead of dips made from mayonnaise or sour cream.  Have a piece of fruit for dessert instead of a piece of cake. Try baked apples, or have some dried fruit.   Tips for eating out  Try broiled, grilled, baked, or poached fish instead of having it fried or breaded. Ask your  to have your meals prepared with olive oil instead of butter. Order dishes made with marinara sauce or sauces made from olive oil. Avoid sauces made from cream or mayonnaise. Choose whole-grain breads, whole wheat pasta and pizza crust, brown rice, beans, and lentils. Cut back on butter or margarine on bread. Instead, you can dip your bread in a small amount of olive oil. Ask for a side salad or grilled vegetables instead of french fries or chips. Where can you learn more? Go to http://www.woods.com/ and enter O407 to learn more about \"Learning About the Mediterranean Diet. \"  Current as of: May 9, 2022               Content Version: 13.5  © 5563-4266 Healthwise, Incorporated. Care instructions adapted under license by [facility] ([facility]). If you have questions about a medical condition or this instruction, always ask your healthcare professional. [address] any warranty or liability for your use of this information. Please visit [email or web address] org for more information regarding cardiovascular disease prevention and treatment.

## 2023-02-28 ENCOUNTER — TELEPHONE (OUTPATIENT)
Dept: CARDIOLOGY CLINIC | Age: 73
End: 2023-02-28

## 2023-03-02 ENCOUNTER — CLINICAL DOCUMENTATION (OUTPATIENT)
Dept: CARDIOTHORACIC SURGERY | Age: 73
End: 2023-03-02

## 2023-03-02 NOTE — PROGRESS NOTES
Mr. Flaherty Raf nurse \"Velasquez\" phoned the office stating pt has hematoma near area of harvest site left leg. No redness,  pain, warmth or oozing. Measures 8 x 6 cm x 3 cm high. Quincy Chavez took pictures. Unable to contact Quincy Chavez at this time. Phoned the patient who states he feels fine. He believes he may have slightly \"bumped\" the site on his new bed night before last.      Pt instructed to call the office if he notes any changes. He voiced understanding.

## 2023-03-14 ENCOUNTER — OFFICE VISIT (OUTPATIENT)
Dept: CARDIOTHORACIC SURGERY | Age: 73
End: 2023-03-14

## 2023-03-14 VITALS
WEIGHT: 156.2 LBS | HEART RATE: 84 BPM | BODY MASS INDEX: 22.36 KG/M2 | SYSTOLIC BLOOD PRESSURE: 137 MMHG | DIASTOLIC BLOOD PRESSURE: 74 MMHG | HEIGHT: 70 IN

## 2023-03-14 DIAGNOSIS — Z95.1 S/P CABG X 3: Primary | ICD-10-CM

## 2023-03-14 PROCEDURE — 99024 POSTOP FOLLOW-UP VISIT: CPT | Performed by: PHYSICIAN ASSISTANT

## 2023-03-14 NOTE — PROGRESS NOTES
[address] [facility]  [facility]. MD Lesa Ann. Srini Vargas MD          Chuckie Burch       [id number]  3/14/2023       1950      Chuckie Burch is seen today for follow-up status post CABG x 3 with LIMA to the LAD, reverse SVG to the RCA and reverse SVG to the Intermediate coronary on 2/15/23. He did well post operatively and was transferred to  stepdown on POD 1. He was weaned off of O2. He progressed well with PT. CTA of the neck showed a 75% stenosis at the origin of right internal carotid artery. He will follow up with vascular surgery on outpatient basis. He remained in sinus rhythm. He was discharged home on 2/20/23. he is active and ambulatory. There is no fever or shortness of breath. Appetite is improving. Pain has improved. Pt is sleeping well. EXAM:  Vitals:  Blood pressure 137/74, pulse 84, height 5' 10\" (1.778 m), weight 156 lb 3.2 oz (70.9 kg). Lungs:  Clear to auscultation bilaterally. Heart: Regular rate and rhythm without murmurs. Incision: Sternum stable. Incision healing well. Leg incisions healing well, seroma noted at left vein harvest site but no erythema or drainage. IMPRESSION and PLAN:  Pt may drive. Pt is planning to begin cardiac rehab. Sternal precautions: Pt advised to avoid any heavy lifting for another 4 weeks but can increase activity as tolerated. Pt has cardiology follow-up with Dr. Sheila Cali. He will return in May for recheck on left leg seroma or earlier as needed. Natan Hayes.  BRENDA Weiss  3/14/2023 1:52 PM

## 2023-03-15 ENCOUNTER — HOSPITAL ENCOUNTER (OUTPATIENT)
Dept: CARDIAC REHAB | Age: 73
Setting detail: RECURRING SERIES
Discharge: HOME | End: 2023-03-18

## 2023-03-15 ASSESSMENT — PATIENT HEALTH QUESTIONNAIRE - PHQ9
SUM OF ALL RESPONSES TO PHQ QUESTIONS 1-9: 4
7. TROUBLE CONCENTRATING ON THINGS, SUCH AS READING THE NEWSPAPER OR WATCHING TELEVISION: 0
1. LITTLE INTEREST OR PLEASURE IN DOING THINGS: 0
10. IF YOU CHECKED OFF ANY PROBLEMS, HOW DIFFICULT HAVE THESE PROBLEMS MADE IT FOR YOU TO DO YOUR WORK, TAKE CARE OF THINGS AT HOME, OR GET ALONG WITH OTHER PEOPLE: 0
2. FEELING DOWN, DEPRESSED OR HOPELESS: 0
9. THOUGHTS THAT YOU WOULD BE BETTER OFF DEAD, OR OF HURTING YOURSELF: 0
6. FEELING BAD ABOUT YOURSELF - OR THAT YOU ARE A FAILURE OR HAVE LET YOURSELF OR YOUR FAMILY DOWN: 0
SUM OF ALL RESPONSES TO PHQ9 QUESTIONS 1 & 2: 0
SUM OF ALL RESPONSES TO PHQ QUESTIONS 1-9: 4
4. FEELING TIRED OR HAVING LITTLE ENERGY: 1
SUM OF ALL RESPONSES TO PHQ QUESTIONS 1-9: 4
SUM OF ALL RESPONSES TO PHQ QUESTIONS 1-9: 4
8. MOVING OR SPEAKING SO SLOWLY THAT OTHER PEOPLE COULD HAVE NOTICED. OR THE OPPOSITE, BEING SO FIGETY OR RESTLESS THAT YOU HAVE BEEN MOVING AROUND A LOT MORE THAN USUAL: 0
5. POOR APPETITE OR OVEREATING: 1
3. TROUBLE FALLING OR STAYING ASLEEP: 2

## 2023-03-15 ASSESSMENT — LIFESTYLE VARIABLES
CIGARETTES_PER_DAY: 0.25 PPD
SMOKELESS_TOBACCO: NO

## 2023-03-15 ASSESSMENT — EJECTION FRACTION: EF_VALUE: 40

## 2023-03-15 NOTE — PROGRESS NOTES
Dear Dr. Sudheer Cardozo,    Thank you for referring your patient,Mr. Milka Bejarano ( 1950), to the Cardiopulmonary Rehabilitation Program at [facility]. He is a good candidate for the Cardiac Rehab Program and should see improvements with regular participation. We will be addressing appropriate interventions for modifiable risk factors with your patient during the next 12 weeks. We will contact you with any issues or concerns that may arise, or you can follow your patient's progress through [address] [place] [address] at any time. A final summary will be sent to you when the program is completed. Again, thank you for the referral. If we can be of further assistance, please feel free to contact the Cardiopulmonary Rehab staff at [phone].     Sincerely,    JAXON Barrera, RN  Cardiopulmonary Rehabilitation Nurse Liaison  [facility]

## 2023-03-21 ENCOUNTER — OFFICE VISIT (OUTPATIENT)
Dept: VASCULAR SURGERY | Age: 73
End: 2023-03-21
Payer: OTHER GOVERNMENT

## 2023-03-21 VITALS
WEIGHT: 159 LBS | OXYGEN SATURATION: 97 % | HEART RATE: 87 BPM | BODY MASS INDEX: 22.76 KG/M2 | SYSTOLIC BLOOD PRESSURE: 133 MMHG | HEIGHT: 70 IN | DIASTOLIC BLOOD PRESSURE: 78 MMHG

## 2023-03-21 DIAGNOSIS — I65.23 BILATERAL CAROTID ARTERY STENOSIS: Primary | ICD-10-CM

## 2023-03-21 PROCEDURE — 99204 OFFICE O/P NEW MOD 45 MIN: CPT | Performed by: SURGERY

## 2023-03-21 PROCEDURE — 3075F SYST BP GE 130 - 139MM HG: CPT | Performed by: SURGERY

## 2023-03-21 PROCEDURE — 3078F DIAST BP <80 MM HG: CPT | Performed by: SURGERY

## 2023-03-21 PROCEDURE — 1123F ACP DISCUSS/DSCN MKR DOCD: CPT | Performed by: SURGERY

## 2023-03-21 NOTE — PROGRESS NOTES
CATH LAB PROCEDURE      FACIAL SURGERY Left 2023    MOHs on left side of nose    PTCA  2012     Social History     Tobacco Use    Smoking status: Former     Packs/day: 0.25     Years: 3.00     Pack years: 0.75     Types: Cigarettes     Start date: 1971     Quit date: 1972     Years since quittin.2     Passive exposure: Current (wife smokes in the home)    Smokeless tobacco: Never   Substance Use Topics    Alcohol use: Not Currently        Review of Systems  Constitutional: Negative for fever and chills. HENT: Negative for congestion and sore throat. Skin: Negative for rash and itching. Eyes: Negative for blurred vision and double vision. Respiratory: Negative for cough and shortness of breath. Cardiovascular: Negative for chest pain, palpitations, claudication and leg swelling. Gastrointestinal: Negative for nausea, vomiting and abdominal pain. Genitourinary: Negative for dysuria, hematuria and flank pain. Musculoskeletal: Negative for joint pain and falls. Neurological: Negative for dizziness, sensory change, focal weakness, loss of consciousness and headaches. PHYSICAL EXAM   [unfilled]     Constitutional: he appears well-developed. No distress. HENT: Hearing intact. Head: Atraumatic. Eyes: Pupils are equal, round, and reactive to light. Neck: Normal range of motion. Cardiovascular: Regular rhythm. Pulmonary/Chest: Effort normal and breath sounds normal. No respiratory distress. Abdominal: Soft. Bowel sounds are normal. he exhibits no distension. There is no tenderness. There is no guarding. No hernia. Musculoskeletal: Normal range of motion. Neurological: He is alert. CN II- XII grossly intact  Skin: Warm and dry. Vascular: No carotid bruits      Imaging Results  Right ICA 70 to 99%  Left 50%      Antegrade    ASSESSMENT AND PLAN   Mr. Farida Adames is a 75y.o. year old male asymptomatic right carotid stenosis.   Recommend a right carotid

## 2023-05-05 ENCOUNTER — TELEPHONE (OUTPATIENT)
Dept: CARDIOLOGY CLINIC | Age: 73
End: 2023-05-05

## 2023-05-09 ENCOUNTER — CLINICAL DOCUMENTATION (OUTPATIENT)
Dept: CARDIOTHORACIC SURGERY | Age: 73
End: 2023-05-09

## 2023-05-09 ENCOUNTER — OFFICE VISIT (OUTPATIENT)
Dept: CARDIOTHORACIC SURGERY | Age: 73
End: 2023-05-09

## 2023-05-09 VITALS
HEART RATE: 98 BPM | BODY MASS INDEX: 23.1 KG/M2 | WEIGHT: 161 LBS | DIASTOLIC BLOOD PRESSURE: 89 MMHG | SYSTOLIC BLOOD PRESSURE: 166 MMHG

## 2023-05-09 DIAGNOSIS — Z95.1 S/P CABG X 3: Primary | ICD-10-CM

## 2023-05-09 PROCEDURE — 99024 POSTOP FOLLOW-UP VISIT: CPT | Performed by: PHYSICIAN ASSISTANT

## 2023-05-09 NOTE — PROGRESS NOTES
Mr. Kenyon Folds presented to the office for a recheck of seroma at vein harvest site. He requests written clearance for plastic surgery on his nose   A letter of clearance will be faxed to [address] Surgery.

## 2023-05-09 NOTE — PROGRESS NOTES
[address] [facility]  [facility]. MD Yony Shrestha. Mimi Servin MD          Romana Colas       [id number]  5/9/2023       1950      Romana Colas is seen today for follow-up status post CABG x 3 with LIMA to the LAD, reverse SVG to the RCA and reverse SVG to the Intermediate coronary on 2/15/23. He did well post operatively and was transferred to  stepdown on POD 1. He was weaned off of O2. He progressed well with PT. CTA of the neck showed a 75% stenosis at the origin of right internal carotid artery. He will follow up with vascular surgery on outpatient basis. He remained in sinus rhythm. He was discharged home on 2/20/23. He was seen in follow up on 3/14. He returns today for recheck on seroma at vein harvest site. he is active and ambulatory. There is no fever or shortness of breath. Appetite is much better. Pain has improved. Pt is sleeping ok, using melatonin. EXAM:  Vitals:  Blood pressure (!) 166/89, pulse 98, weight 161 lb (73 kg). Lungs:  Clear to auscultation bilaterally. Heart: Regular rate and rhythm without murmurs. Incision: Sternum stable. Incision healing well. Leg incisions healing well, seroma at vein harvest site is smaller in size, no erythema. IMPRESSION and PLAN:  DC from CT surgery. Pt has cardiology follow-up with Dr. Shanthi Arriaza. No need to schedule follow-up at this point but the patient may call or return anytime if issues or questions arise. He is ok to proceed with his plastic surgery on his nose from our standpoint. Arleen Pandey.  BRENDA Weiss  5/9/2023

## 2023-05-10 NOTE — TELEPHONE ENCOUNTER
Physician's response faxed to Dr. Yves Raman [phone]. \"CABG 3 months ago, as yet uncorrected but asymptomatic carotid stenosis awaiting surgery in July. If possible, would recommend waiting. If cancerous lesions to be removed or high risk situation, he is low/moderate risk to proceed and should continu e all meds including aspirin. \"

## 2023-05-29 NOTE — PROGRESS NOTES
[address], [facility] [address]  PHONE: [phone]      23    NAME:  Richard Booker  : 1950  MRN: [id number]         SUBJECTIVE:   Richard Booker is a 75 y.o. male seen for a follow up visit regarding the following:     Chief Complaint   Patient presents with    Chest Pain    3 Month Follow-Up            HPI:  Follow up  Chest Pain and 3 Month Follow-Up   . Follow up CABG with mild ischemic CM, seeing vascular for carotid artery stenosis. Plans are for surgery in July. He did not attend cardiac rehab citing a sore on his foot and his podiatrist is out of town, and reporting that the approval from the [place] ran out last weekend. He has been walking his dog several times a day on his own and feels well without angina or claudication. He remains a non smoker but wife smokes in the home. PAST CARDIAC HISTORY:  [facility]           NSTEMI - coronary stents x 2  Oct 2022       TMET (at [facility]) - 1.56 seconds, 102% HR, equivocal ST changes. Reproduced anginal symptoms Deconditioned. Inconclusive study  Dec 2022       [facility] - canceled d/t URI  2023       LIMA-LAD, SVG-RCA, SVG-ramus.  LVEF 45-50%       CTA neck - 75% origin SANDRA                Wu CAD CHF Meds            losartan-hydroCHLOROthiazide (HYZAAR) 100-25 MG per tablet (Taking)    Class: Historical Med    metoprolol succinate (TOPROL XL) 50 MG extended release tablet (Taking)    Class: Historical Med    rosuvastatin (CRESTOR) 40 MG tablet (Taking)    Class: Historical Med          Key Antihyperglycemic Medications            glipiZIDE (GLUCOTROL) 10 MG tablet (Taking)    Class: Historical Med    alogliptin (NESINA) 12.5 MG TABS tablet (Taking)    Class: Historical Med    insulin glargine (LANTUS;BASAGLAR) 100 UNIT/ML injection pen (Taking)    Class: Historical Med    metFORMIN (GLUCOPHAGE) 1000 MG tablet (Taking)    Class: Historical Med                Past Medical

## 2023-05-30 ENCOUNTER — OFFICE VISIT (OUTPATIENT)
Age: 73
End: 2023-05-30
Payer: OTHER GOVERNMENT

## 2023-05-30 VITALS
HEART RATE: 92 BPM | HEIGHT: 70 IN | DIASTOLIC BLOOD PRESSURE: 78 MMHG | SYSTOLIC BLOOD PRESSURE: 138 MMHG | WEIGHT: 164.2 LBS | BODY MASS INDEX: 23.51 KG/M2

## 2023-05-30 DIAGNOSIS — I65.21 ASYMPTOMATIC STENOSIS OF RIGHT CAROTID ARTERY: ICD-10-CM

## 2023-05-30 DIAGNOSIS — E11.9 TYPE 2 DIABETES MELLITUS WITHOUT COMPLICATION, WITH LONG-TERM CURRENT USE OF INSULIN (HCC): Chronic | ICD-10-CM

## 2023-05-30 DIAGNOSIS — Z79.4 TYPE 2 DIABETES MELLITUS WITHOUT COMPLICATION, WITH LONG-TERM CURRENT USE OF INSULIN (HCC): Chronic | ICD-10-CM

## 2023-05-30 DIAGNOSIS — E78.5 DYSLIPIDEMIA: ICD-10-CM

## 2023-05-30 DIAGNOSIS — I10 ESSENTIAL HYPERTENSION: Chronic | ICD-10-CM

## 2023-05-30 DIAGNOSIS — I25.118 CORONARY ARTERY DISEASE OF NATIVE ARTERY OF NATIVE HEART WITH STABLE ANGINA PECTORIS (HCC): Primary | Chronic | ICD-10-CM

## 2023-05-30 DIAGNOSIS — Z95.1 S/P CABG X 3: ICD-10-CM

## 2023-05-30 DIAGNOSIS — I25.5 ISCHEMIC CARDIOMYOPATHY: ICD-10-CM

## 2023-05-30 PROCEDURE — 3078F DIAST BP <80 MM HG: CPT | Performed by: INTERNAL MEDICINE

## 2023-05-30 PROCEDURE — 3052F HG A1C>EQUAL 8.0%<EQUAL 9.0%: CPT | Performed by: INTERNAL MEDICINE

## 2023-05-30 PROCEDURE — 3074F SYST BP LT 130 MM HG: CPT | Performed by: INTERNAL MEDICINE

## 2023-05-30 PROCEDURE — 99214 OFFICE O/P EST MOD 30 MIN: CPT | Performed by: INTERNAL MEDICINE

## 2023-05-30 PROCEDURE — 1123F ACP DISCUSS/DSCN MKR DOCD: CPT | Performed by: INTERNAL MEDICINE

## 2023-05-30 RX ORDER — METOPROLOL SUCCINATE 50 MG/1
25 TABLET, EXTENDED RELEASE ORAL DAILY
COMMUNITY

## 2023-05-30 RX ORDER — LOSARTAN POTASSIUM AND HYDROCHLOROTHIAZIDE 25; 100 MG/1; MG/1
1 TABLET ORAL DAILY
COMMUNITY

## 2023-05-30 ASSESSMENT — ENCOUNTER SYMPTOMS: SHORTNESS OF BREATH: 0

## 2023-07-11 ENCOUNTER — APPOINTMENT (RX ONLY)
Dept: URBAN - NONMETROPOLITAN AREA CLINIC 1 | Facility: CLINIC | Age: 73
Setting detail: DERMATOLOGY
End: 2023-07-11

## 2023-07-11 DIAGNOSIS — Z85.828 PERSONAL HISTORY OF OTHER MALIGNANT NEOPLASM OF SKIN: ICD-10-CM | Status: RESOLVED

## 2023-07-11 DIAGNOSIS — L82.0 INFLAMED SEBORRHEIC KERATOSIS: ICD-10-CM

## 2023-07-11 DIAGNOSIS — L57.0 ACTINIC KERATOSIS: ICD-10-CM | Status: INADEQUATELY CONTROLLED

## 2023-07-11 DIAGNOSIS — D22 MELANOCYTIC NEVI: ICD-10-CM

## 2023-07-11 DIAGNOSIS — L82.1 OTHER SEBORRHEIC KERATOSIS: ICD-10-CM

## 2023-07-11 PROBLEM — D22.9 MELANOCYTIC NEVI, UNSPECIFIED: Status: ACTIVE | Noted: 2023-07-11

## 2023-07-11 PROCEDURE — ? PRESCRIPTION

## 2023-07-11 PROCEDURE — ? COUNSELING

## 2023-07-11 PROCEDURE — 17110 DESTRUCTION B9 LES UP TO 14: CPT

## 2023-07-11 PROCEDURE — 99214 OFFICE O/P EST MOD 30 MIN: CPT | Mod: 25

## 2023-07-11 PROCEDURE — ? MDM - TREATMENT GOALS

## 2023-07-11 PROCEDURE — ? FULL BODY SKIN EXAM

## 2023-07-11 PROCEDURE — ? PRESCRIPTION MEDICATION MANAGEMENT

## 2023-07-11 PROCEDURE — ? LIQUID NITROGEN

## 2023-07-11 RX ORDER — FLUOROURACIL 5 MG/G
CREAM TOPICAL
Qty: 40 | Refills: 0 | Status: ERX | COMMUNITY
Start: 2023-07-11

## 2023-07-11 RX ADMIN — FLUOROURACIL: 5 CREAM TOPICAL at 00:00

## 2023-07-11 ASSESSMENT — LOCATION DETAILED DESCRIPTION DERM
LOCATION DETAILED: LEFT FOREHEAD
LOCATION DETAILED: RIGHT NASAL DORSUM
LOCATION DETAILED: RIGHT MID-UPPER BACK
LOCATION DETAILED: RIGHT MIDDLE PROXIMAL INTERPHALANGEAL JOINT
LOCATION DETAILED: NASAL SUPRATIP
LOCATION DETAILED: LEFT INFERIOR FOREHEAD

## 2023-07-11 ASSESSMENT — LOCATION ZONE DERM
LOCATION ZONE: FINGER
LOCATION ZONE: FACE
LOCATION ZONE: NOSE
LOCATION ZONE: TRUNK

## 2023-07-11 ASSESSMENT — LOCATION SIMPLE DESCRIPTION DERM
LOCATION SIMPLE: RIGHT MIDDLE FINGER
LOCATION SIMPLE: LEFT FOREHEAD
LOCATION SIMPLE: RIGHT UPPER BACK
LOCATION SIMPLE: NOSE

## 2023-07-11 NOTE — PROCEDURE: LIQUID NITROGEN
Spray Paint Text: The liquid nitrogen was applied to the skin utilizing a spray paint frosting technique.
Show Applicator Variable?: Yes
Spray Paint Technique: No
Detail Level: Detailed
Consent: The patient's consent was obtained including but not limited to risks of crusting, scabbing, blistering, scarring, darker or lighter pigmentary change, recurrence, incomplete removal and infection.
Medical Necessity Information: It is in your best interest to select a reason for this procedure from the list below. All of these items fulfill various CMS LCD requirements except the new and changing color options.
Post-Care Instructions: I reviewed with the patient in detail post-care instructions. Patient is to wear sunprotection, and avoid picking at any of the treated lesions. Pt may apply Vaseline to crusted or scabbing areas.
Medical Necessity Clause: This procedure was medically necessary because the lesions that were treated were:

## 2023-08-08 ENCOUNTER — OFFICE VISIT (OUTPATIENT)
Dept: VASCULAR SURGERY | Age: 73
End: 2023-08-08
Payer: OTHER GOVERNMENT

## 2023-08-08 ENCOUNTER — PREP FOR PROCEDURE (OUTPATIENT)
Dept: VASCULAR SURGERY | Age: 73
End: 2023-08-08

## 2023-08-08 VITALS
BODY MASS INDEX: 23.48 KG/M2 | HEIGHT: 70 IN | DIASTOLIC BLOOD PRESSURE: 84 MMHG | OXYGEN SATURATION: 96 % | SYSTOLIC BLOOD PRESSURE: 137 MMHG | WEIGHT: 164 LBS | HEART RATE: 80 BPM

## 2023-08-08 DIAGNOSIS — I65.23 BILATERAL CAROTID ARTERY STENOSIS: Primary | ICD-10-CM

## 2023-08-08 PROBLEM — I65.21 STENOSIS OF RIGHT CAROTID ARTERY: Status: ACTIVE | Noted: 2023-08-08

## 2023-08-08 PROCEDURE — 3078F DIAST BP <80 MM HG: CPT | Performed by: SURGERY

## 2023-08-08 PROCEDURE — 1123F ACP DISCUSS/DSCN MKR DOCD: CPT | Performed by: SURGERY

## 2023-08-08 PROCEDURE — 3075F SYST BP GE 130 - 139MM HG: CPT | Performed by: SURGERY

## 2023-08-08 PROCEDURE — 99213 OFFICE O/P EST LOW 20 MIN: CPT | Performed by: SURGERY

## 2023-08-08 NOTE — PROGRESS NOTES
[address] FAX: [phone]    Mervin Fontaine  : 1950    Chief Complaint:     History of Present Illness:   Patient follows up today for follow-up duplex study. Patient was found have a 70% stenosis on his ICA prior to his open heart surgery. He has been 6 months from the open heart surgery. CURRENT MEDICATIONS:  Current Outpatient Medications   Medication Sig Dispense Refill    losartan-hydroCHLOROthiazide (HYZAAR) 100-25 MG per tablet Take 1 tablet by mouth daily      metoprolol succinate (TOPROL XL) 50 MG extended release tablet Take 0.5 tablets by mouth daily      turmeric 500 MG CAPS Take 2 capsules by mouth daily      acetaminophen (TYLENOL) 325 MG tablet Take 2 tablets by mouth every 6 hours as needed for Pain 120 tablet 3    Multiple Vitamins-Minerals (MULTIVITAMIN WITH MINERALS) tablet Take 1 tablet by mouth daily      Coenzyme Q10 (CO Q 10) 10 MG CAPS Take 1 capsule by mouth daily      glipiZIDE (GLUCOTROL) 10 MG tablet Take 1 tablet by mouth 2 times daily (before meals) 60 tablet     alogliptin (NESINA) 12.5 MG TABS tablet Take 1 tablet by mouth daily      aspirin 81 MG EC tablet Take 1 tablet by mouth daily      cyanocobalamin 1000 MCG tablet Take 1 tablet by mouth daily      ferrous sulfate (IRON 325) 325 (65 Fe) MG tablet Take 1 tablet by mouth daily (with breakfast)      finasteride (PROSCAR) 5 MG tablet Take 1 tablet by mouth daily      HYDROcodone-acetaminophen (NORCO)  MG per tablet Take 1 tablet by mouth in the morning and at bedtime.       insulin glargine (LANTUS;BASAGLAR) 100 UNIT/ML injection pen Inject 16 Units into the skin nightly      metFORMIN (GLUCOPHAGE) 1000 MG tablet Take 1 tablet by mouth 2 times daily (with meals)      rosuvastatin (CRESTOR) 40 MG tablet Take 1 tablet by mouth daily      nitroGLYCERIN (NITROSTAT) 0.4 MG SL tablet Place 1 tablet under the tongue every 5 minutes as needed for Chest pain

## 2023-08-14 ENCOUNTER — HOSPITAL ENCOUNTER (OUTPATIENT)
Dept: SURGERY | Age: 73
Discharge: HOME | End: 2023-08-17
Payer: OTHER GOVERNMENT

## 2023-08-14 VITALS
BODY MASS INDEX: 23.48 KG/M2 | OXYGEN SATURATION: 98 % | DIASTOLIC BLOOD PRESSURE: 69 MMHG | WEIGHT: 164 LBS | HEIGHT: 70 IN | RESPIRATION RATE: 16 BRPM | TEMPERATURE: 97.3 F | HEART RATE: 79 BPM | SYSTOLIC BLOOD PRESSURE: 154 MMHG

## 2023-08-14 LAB
GLUCOSE BLD STRIP.AUTO-MCNC: 230 MG/DL (ref 65–100)
HGB BLD-MCNC: 13.5 G/DL (ref 13.6–17.2)
SERVICE CMNT-IMP: ABNORMAL

## 2023-08-14 PROCEDURE — 85018 HEMOGLOBIN: CPT

## 2023-08-14 PROCEDURE — 82962 GLUCOSE BLOOD TEST: CPT

## 2023-08-14 RX ORDER — LANOLIN ALCOHOL/MO/W.PET/CERES
400 CREAM (GRAM) TOPICAL 2 TIMES DAILY
COMMUNITY

## 2023-08-14 RX ORDER — LOSARTAN POTASSIUM 50 MG/1
50 TABLET ORAL NIGHTLY
COMMUNITY

## 2023-08-14 NOTE — PROGRESS NOTES
Patient verified name and     Order for consent NOT found in EHR; patient verified. Type 2 surgery, walk-in assessment complete. Labs per surgeon: NONE;   Labs per anesthesia protocol: HGB, POC Glucose (230), T/S s/h dos; results pending  [id number]    POC glucose 230. Instructed Patient that if blood sugar 300 or > , surgery may be cancelled. Pt voice understanding. SN instruct pt to contact [phone] for any complications. Pt had a CABG on 02/15/2023. Chart, carotid ultrasound and last cardiology note in box for anesthesia review. Chart flagged for CN to follow up. Hospital approved surgical skin cleanser and instructions given per hospital policy. Patient provided with and instructed on educational handouts including Guide to Surgery, Pain Management, Hand Hygiene, Blood Transfusion Education, and [facility] Anesthesia Brochure. Patient answered medical/surgical history questions at their best of ability. All prior to admission medications documented in [facility]. Original medication prescription bottle WERE visualized during patient appointment. Patient instructed to hold all vitamins 7 days prior to surgery and NSAIDS 5 days prior to surgery, patient verbalized understanding. Patient teach back successful and patient demonstrates knowledge of instructions. PLEASE CONTINUE TAKING ALL PRESCRIPTION MEDICATIONS UP TO THE DAY OF SURGERY UNLESS OTHERWISE DIRECTED BELOW. DISCONTINUE all vitamins and supplements 7 days prior to surgery. DISCONTINUE Non-Steriodal Anti-Inflammatory (NSAIDS) such as Advil and Aleve 5 days prior to surgery. Home Medications to take  the day of surgery      Hydrocodone-acetaminophen    Finasteride   Aspirin 81 mg    Metoprolol   Nitroglycerin if needed      Home Medications   to Hold           Comments     Mariluz-hex shower the night before surgery and the morning of surgery. Insulin Lantus: take 14 units the night before surgery.

## 2023-08-18 ENCOUNTER — HOSPITAL ENCOUNTER (OUTPATIENT)
Dept: CT IMAGING | Age: 73
Discharge: HOME | End: 2023-08-18
Attending: SURGERY
Payer: OTHER GOVERNMENT

## 2023-08-18 ENCOUNTER — TELEPHONE (OUTPATIENT)
Dept: VASCULAR SURGERY | Age: 73
End: 2023-08-18

## 2023-08-18 DIAGNOSIS — I65.23 BILATERAL CAROTID ARTERY STENOSIS: ICD-10-CM

## 2023-08-18 LAB — CREAT BLD-MCNC: 1 MG/DL (ref 0.8–1.5)

## 2023-08-18 PROCEDURE — 2580000003 HC RX 258: Performed by: SURGERY

## 2023-08-18 PROCEDURE — 6360000004 HC RX CONTRAST MEDICATION: Performed by: SURGERY

## 2023-08-18 PROCEDURE — 82565 ASSAY OF CREATININE: CPT

## 2023-08-18 PROCEDURE — 70498 CT ANGIOGRAPHY NECK: CPT

## 2023-08-18 RX ORDER — 0.9 % SODIUM CHLORIDE 0.9 %
100 INTRAVENOUS SOLUTION INTRAVENOUS
Status: COMPLETED | OUTPATIENT
Start: 2023-08-18 | End: 2023-08-18

## 2023-08-18 RX ADMIN — IOPAMIDOL 50 ML: 755 INJECTION, SOLUTION INTRAVENOUS at 08:19

## 2023-08-18 RX ADMIN — SODIUM CHLORIDE 100 ML: 9 INJECTION, SOLUTION INTRAVENOUS at 08:19

## 2023-08-18 NOTE — TELEPHONE ENCOUNTER
Maximus Edwards from pt's PCP office, Dr. Jh Santana called in response to my voice message regarding our need for a request referral from the [place]. Mr. Jeffrey Soto called the [place] on 8/8 and they told him the referral has to come from his PCP. I have been calling them since 8/16 after not hearing anything from the patient's call to them on 8/10. But today Maximus Edwards took all the necessary information to request the referral and will submit it immediately. She is aware that the pt has a surgery scheduled for Mon. 8/21/23.

## 2023-08-20 ENCOUNTER — ANESTHESIA EVENT (OUTPATIENT)
Dept: SURGERY | Age: 73
DRG: 039 | End: 2023-08-20
Payer: MEDICARE

## 2023-08-20 RX ORDER — PROCHLORPERAZINE EDISYLATE 5 MG/ML
5 INJECTION INTRAMUSCULAR; INTRAVENOUS
Status: CANCELLED | OUTPATIENT
Start: 2023-08-20 | End: 2023-08-21

## 2023-08-20 RX ORDER — DIPHENHYDRAMINE HYDROCHLORIDE 50 MG/ML
12.5 INJECTION INTRAMUSCULAR; INTRAVENOUS
Status: CANCELLED | OUTPATIENT
Start: 2023-08-20 | End: 2023-08-21

## 2023-08-20 RX ORDER — OXYCODONE HYDROCHLORIDE 5 MG/1
10 TABLET ORAL PRN
Status: CANCELLED | OUTPATIENT
Start: 2023-08-20 | End: 2023-08-20

## 2023-08-20 RX ORDER — OXYCODONE HYDROCHLORIDE 5 MG/1
5 TABLET ORAL PRN
Status: CANCELLED | OUTPATIENT
Start: 2023-08-20 | End: 2023-08-20

## 2023-08-20 RX ORDER — HYDROMORPHONE HYDROCHLORIDE 2 MG/ML
0.25 INJECTION, SOLUTION INTRAMUSCULAR; INTRAVENOUS; SUBCUTANEOUS EVERY 5 MIN PRN
Status: CANCELLED | OUTPATIENT
Start: 2023-08-20

## 2023-08-20 RX ORDER — ONDANSETRON 2 MG/ML
4 INJECTION INTRAMUSCULAR; INTRAVENOUS
Status: CANCELLED | OUTPATIENT
Start: 2023-08-20 | End: 2023-08-21

## 2023-08-20 RX ORDER — HYDROMORPHONE HYDROCHLORIDE 2 MG/ML
0.5 INJECTION, SOLUTION INTRAMUSCULAR; INTRAVENOUS; SUBCUTANEOUS EVERY 5 MIN PRN
Status: CANCELLED | OUTPATIENT
Start: 2023-08-20

## 2023-08-21 ENCOUNTER — ANESTHESIA (OUTPATIENT)
Dept: SURGERY | Age: 73
DRG: 039 | End: 2023-08-21
Payer: MEDICARE

## 2023-08-21 ENCOUNTER — HOSPITAL ENCOUNTER (INPATIENT)
Age: 73
LOS: 1 days | Discharge: HOME | DRG: 039 | End: 2023-08-22
Attending: SURGERY | Admitting: SURGERY
Payer: MEDICARE

## 2023-08-21 ENCOUNTER — APPOINTMENT (OUTPATIENT)
Dept: ULTRASOUND IMAGING | Age: 73
DRG: 039 | End: 2023-08-21
Attending: SURGERY
Payer: MEDICARE

## 2023-08-21 DIAGNOSIS — I65.21 CAROTID STENOSIS, ASYMPTOMATIC, RIGHT: Primary | ICD-10-CM

## 2023-08-21 DIAGNOSIS — I65.21 STENOSIS OF RIGHT CAROTID ARTERY: ICD-10-CM

## 2023-08-21 LAB
ABO + RH BLD: NORMAL
ACT BLD: 131 SECS (ref 70–128)
ACT BLD: 275 SECS (ref 70–128)
BLOOD GROUP ANTIBODIES SERPL: NORMAL
GLUCOSE BLD STRIP.AUTO-MCNC: 149 MG/DL (ref 65–100)
GLUCOSE BLD STRIP.AUTO-MCNC: 165 MG/DL (ref 65–100)
GLUCOSE BLD STRIP.AUTO-MCNC: 232 MG/DL (ref 65–100)
GLUCOSE BLD STRIP.AUTO-MCNC: 311 MG/DL (ref 65–100)
SERVICE CMNT-IMP: ABNORMAL
SPECIMEN EXP DATE BLD: NORMAL

## 2023-08-21 PROCEDURE — 3600000005 HC SURGERY LEVEL 5 BASE: Performed by: SURGERY

## 2023-08-21 PROCEDURE — 82962 GLUCOSE BLOOD TEST: CPT

## 2023-08-21 PROCEDURE — 2500000003 HC RX 250 WO HCPCS: Performed by: SURGERY

## 2023-08-21 PROCEDURE — 3700000000 HC ANESTHESIA ATTENDED CARE: Performed by: SURGERY

## 2023-08-21 PROCEDURE — 03CK0ZZ EXTIRPATION OF MATTER FROM RIGHT INTERNAL CAROTID ARTERY, OPEN APPROACH: ICD-10-PCS | Performed by: SURGERY

## 2023-08-21 PROCEDURE — 6360000002 HC RX W HCPCS: Performed by: NURSE ANESTHETIST, CERTIFIED REGISTERED

## 2023-08-21 PROCEDURE — 3600000015 HC SURGERY LEVEL 5 ADDTL 15MIN: Performed by: SURGERY

## 2023-08-21 PROCEDURE — 2580000003 HC RX 258: Performed by: NURSE ANESTHETIST, CERTIFIED REGISTERED

## 2023-08-21 PROCEDURE — 6360000002 HC RX W HCPCS: Performed by: SURGERY

## 2023-08-21 PROCEDURE — 3700000001 HC ADD 15 MINUTES (ANESTHESIA): Performed by: SURGERY

## 2023-08-21 PROCEDURE — 86901 BLOOD TYPING SEROLOGIC RH(D): CPT

## 2023-08-21 PROCEDURE — 2709999900 HC NON-CHARGEABLE SUPPLY: Performed by: SURGERY

## 2023-08-21 PROCEDURE — 86900 BLOOD TYPING SEROLOGIC ABO: CPT

## 2023-08-21 PROCEDURE — C1768 GRAFT, VASCULAR: HCPCS | Performed by: SURGERY

## 2023-08-21 PROCEDURE — 2500000003 HC RX 250 WO HCPCS: Performed by: NURSE ANESTHETIST, CERTIFIED REGISTERED

## 2023-08-21 PROCEDURE — C1889 IMPLANT/INSERT DEVICE, NOC: HCPCS | Performed by: SURGERY

## 2023-08-21 PROCEDURE — C1769 GUIDE WIRE: HCPCS | Performed by: SURGERY

## 2023-08-21 PROCEDURE — 6370000000 HC RX 637 (ALT 250 FOR IP): Performed by: NURSE PRACTITIONER

## 2023-08-21 PROCEDURE — 35301 RECHANNELING OF ARTERY: CPT | Performed by: SURGERY

## 2023-08-21 PROCEDURE — 6370000000 HC RX 637 (ALT 250 FOR IP): Performed by: SURGERY

## 2023-08-21 PROCEDURE — 85347 COAGULATION TIME ACTIVATED: CPT

## 2023-08-21 PROCEDURE — 86850 RBC ANTIBODY SCREEN: CPT

## 2023-08-21 PROCEDURE — 2580000003 HC RX 258: Performed by: ANESTHESIOLOGY

## 2023-08-21 PROCEDURE — 2100000001 HC CVICU R&B

## 2023-08-21 PROCEDURE — 2580000003 HC RX 258: Performed by: SURGERY

## 2023-08-21 DEVICE — XENOSURE BIOLOGIC PATCH, 0.8CM X 8CM, EIFU
Type: IMPLANTABLE DEVICE | Site: CAROTID | Status: FUNCTIONAL
Brand: XENOSURE BIOLOGIC PATCH

## 2023-08-21 RX ORDER — LIDOCAINE HYDROCHLORIDE 20 MG/ML
INJECTION, SOLUTION EPIDURAL; INFILTRATION; INTRACAUDAL; PERINEURAL PRN
Status: DISCONTINUED | OUTPATIENT
Start: 2023-08-21 | End: 2023-08-21 | Stop reason: SDUPTHER

## 2023-08-21 RX ORDER — DEXTROSE MONOHYDRATE 100 MG/ML
INJECTION, SOLUTION INTRAVENOUS CONTINUOUS PRN
Status: DISCONTINUED | OUTPATIENT
Start: 2023-08-21 | End: 2023-08-22 | Stop reason: HOSPADM

## 2023-08-21 RX ORDER — ROCURONIUM BROMIDE 10 MG/ML
INJECTION, SOLUTION INTRAVENOUS PRN
Status: DISCONTINUED | OUTPATIENT
Start: 2023-08-21 | End: 2023-08-21 | Stop reason: SDUPTHER

## 2023-08-21 RX ORDER — DEXAMETHASONE SODIUM PHOSPHATE 10 MG/ML
INJECTION INTRAMUSCULAR; INTRAVENOUS PRN
Status: DISCONTINUED | OUTPATIENT
Start: 2023-08-21 | End: 2023-08-21 | Stop reason: SDUPTHER

## 2023-08-21 RX ORDER — SODIUM CHLORIDE 0.9 % (FLUSH) 0.9 %
5-40 SYRINGE (ML) INJECTION PRN
Status: DISCONTINUED | OUTPATIENT
Start: 2023-08-21 | End: 2023-08-21 | Stop reason: HOSPADM

## 2023-08-21 RX ORDER — METOPROLOL SUCCINATE 25 MG/1
25 TABLET, EXTENDED RELEASE ORAL DAILY
Status: DISCONTINUED | OUTPATIENT
Start: 2023-08-22 | End: 2023-08-22 | Stop reason: HOSPADM

## 2023-08-21 RX ORDER — SODIUM CHLORIDE, SODIUM LACTATE, POTASSIUM CHLORIDE, CALCIUM CHLORIDE 600; 310; 30; 20 MG/100ML; MG/100ML; MG/100ML; MG/100ML
INJECTION, SOLUTION INTRAVENOUS CONTINUOUS
Status: DISCONTINUED | OUTPATIENT
Start: 2023-08-21 | End: 2023-08-21

## 2023-08-21 RX ORDER — PROPOFOL 10 MG/ML
INJECTION, EMULSION INTRAVENOUS PRN
Status: DISCONTINUED | OUTPATIENT
Start: 2023-08-21 | End: 2023-08-21 | Stop reason: SDUPTHER

## 2023-08-21 RX ORDER — SODIUM CHLORIDE 9 MG/ML
INJECTION, SOLUTION INTRAVENOUS CONTINUOUS
Status: DISCONTINUED | OUTPATIENT
Start: 2023-08-21 | End: 2023-08-22 | Stop reason: HOSPADM

## 2023-08-21 RX ORDER — LIDOCAINE HYDROCHLORIDE 10 MG/ML
INJECTION, SOLUTION INFILTRATION; PERINEURAL PRN
Status: DISCONTINUED | OUTPATIENT
Start: 2023-08-21 | End: 2023-08-21 | Stop reason: ALTCHOICE

## 2023-08-21 RX ORDER — FENTANYL CITRATE 50 UG/ML
100 INJECTION, SOLUTION INTRAMUSCULAR; INTRAVENOUS
Status: DISCONTINUED | OUTPATIENT
Start: 2023-08-21 | End: 2023-08-21 | Stop reason: HOSPADM

## 2023-08-21 RX ORDER — GLIPIZIDE 5 MG/1
10 TABLET ORAL
Status: DISCONTINUED | OUTPATIENT
Start: 2023-08-21 | End: 2023-08-22 | Stop reason: HOSPADM

## 2023-08-21 RX ORDER — INSULIN LISPRO 100 [IU]/ML
0-8 INJECTION, SOLUTION INTRAVENOUS; SUBCUTANEOUS
Status: DISCONTINUED | OUTPATIENT
Start: 2023-08-21 | End: 2023-08-22 | Stop reason: HOSPADM

## 2023-08-21 RX ORDER — NITROGLYCERIN 0.4 MG/1
0.4 TABLET SUBLINGUAL EVERY 5 MIN PRN
Status: DISCONTINUED | OUTPATIENT
Start: 2023-08-21 | End: 2023-08-22 | Stop reason: HOSPADM

## 2023-08-21 RX ORDER — NEOSTIGMINE METHYLSULFATE 1 MG/ML
INJECTION, SOLUTION INTRAVENOUS PRN
Status: DISCONTINUED | OUTPATIENT
Start: 2023-08-21 | End: 2023-08-21 | Stop reason: SDUPTHER

## 2023-08-21 RX ORDER — MORPHINE SULFATE 2 MG/ML
2 INJECTION, SOLUTION INTRAMUSCULAR; INTRAVENOUS
Status: DISCONTINUED | OUTPATIENT
Start: 2023-08-21 | End: 2023-08-22 | Stop reason: HOSPADM

## 2023-08-21 RX ORDER — ASPIRIN 81 MG/1
81 TABLET ORAL DAILY
Status: DISCONTINUED | OUTPATIENT
Start: 2023-08-21 | End: 2023-08-22 | Stop reason: HOSPADM

## 2023-08-21 RX ORDER — SODIUM CHLORIDE, SODIUM LACTATE, POTASSIUM CHLORIDE, CALCIUM CHLORIDE 600; 310; 30; 20 MG/100ML; MG/100ML; MG/100ML; MG/100ML
INJECTION, SOLUTION INTRAVENOUS CONTINUOUS PRN
Status: DISCONTINUED | OUTPATIENT
Start: 2023-08-21 | End: 2023-08-21 | Stop reason: SDUPTHER

## 2023-08-21 RX ORDER — SODIUM CHLORIDE 9 MG/ML
INJECTION, SOLUTION INTRAVENOUS PRN
Status: DISCONTINUED | OUTPATIENT
Start: 2023-08-21 | End: 2023-08-21 | Stop reason: HOSPADM

## 2023-08-21 RX ORDER — ONDANSETRON 2 MG/ML
INJECTION INTRAMUSCULAR; INTRAVENOUS PRN
Status: DISCONTINUED | OUTPATIENT
Start: 2023-08-21 | End: 2023-08-21 | Stop reason: SDUPTHER

## 2023-08-21 RX ORDER — OXYCODONE HYDROCHLORIDE AND ACETAMINOPHEN 5; 325 MG/1; MG/1
1 TABLET ORAL EVERY 4 HOURS PRN
Status: DISCONTINUED | OUTPATIENT
Start: 2023-08-21 | End: 2023-08-22 | Stop reason: HOSPADM

## 2023-08-21 RX ORDER — FENTANYL CITRATE 50 UG/ML
INJECTION, SOLUTION INTRAMUSCULAR; INTRAVENOUS PRN
Status: DISCONTINUED | OUTPATIENT
Start: 2023-08-21 | End: 2023-08-21 | Stop reason: SDUPTHER

## 2023-08-21 RX ORDER — GLYCOPYRROLATE 0.2 MG/ML
INJECTION INTRAMUSCULAR; INTRAVENOUS PRN
Status: DISCONTINUED | OUTPATIENT
Start: 2023-08-21 | End: 2023-08-21 | Stop reason: SDUPTHER

## 2023-08-21 RX ORDER — NICARDIPINE HYDROCHLORIDE 0.1 MG/ML
INJECTION INTRAVENOUS CONTINUOUS PRN
Status: DISCONTINUED | OUTPATIENT
Start: 2023-08-21 | End: 2023-08-21 | Stop reason: SDUPTHER

## 2023-08-21 RX ORDER — ESMOLOL HYDROCHLORIDE 10 MG/ML
INJECTION INTRAVENOUS PRN
Status: DISCONTINUED | OUTPATIENT
Start: 2023-08-21 | End: 2023-08-21 | Stop reason: SDUPTHER

## 2023-08-21 RX ORDER — EPHEDRINE SULFATE/0.9% NACL/PF 50 MG/5 ML
SYRINGE (ML) INTRAVENOUS PRN
Status: DISCONTINUED | OUTPATIENT
Start: 2023-08-21 | End: 2023-08-21 | Stop reason: SDUPTHER

## 2023-08-21 RX ORDER — PROTAMINE SULFATE 10 MG/ML
INJECTION, SOLUTION INTRAVENOUS PRN
Status: DISCONTINUED | OUTPATIENT
Start: 2023-08-21 | End: 2023-08-21 | Stop reason: SDUPTHER

## 2023-08-21 RX ORDER — HEPARIN SODIUM 1000 [USP'U]/ML
INJECTION, SOLUTION INTRAVENOUS; SUBCUTANEOUS PRN
Status: DISCONTINUED | OUTPATIENT
Start: 2023-08-21 | End: 2023-08-21 | Stop reason: SDUPTHER

## 2023-08-21 RX ORDER — HEPARIN SODIUM 5000 [USP'U]/ML
INJECTION, SOLUTION INTRAVENOUS; SUBCUTANEOUS PRN
Status: DISCONTINUED | OUTPATIENT
Start: 2023-08-21 | End: 2023-08-21 | Stop reason: ALTCHOICE

## 2023-08-21 RX ORDER — IBUPROFEN 600 MG/1
1 TABLET ORAL PRN
Status: DISCONTINUED | OUTPATIENT
Start: 2023-08-21 | End: 2023-08-22 | Stop reason: HOSPADM

## 2023-08-21 RX ORDER — LIDOCAINE HYDROCHLORIDE 10 MG/ML
1 INJECTION, SOLUTION INFILTRATION; PERINEURAL
Status: DISCONTINUED | OUTPATIENT
Start: 2023-08-21 | End: 2023-08-21 | Stop reason: HOSPADM

## 2023-08-21 RX ORDER — INSULIN LISPRO 100 [IU]/ML
0-4 INJECTION, SOLUTION INTRAVENOUS; SUBCUTANEOUS NIGHTLY
Status: DISCONTINUED | OUTPATIENT
Start: 2023-08-21 | End: 2023-08-22 | Stop reason: HOSPADM

## 2023-08-21 RX ORDER — SODIUM CHLORIDE 0.9 % (FLUSH) 0.9 %
5-40 SYRINGE (ML) INJECTION EVERY 12 HOURS SCHEDULED
Status: DISCONTINUED | OUTPATIENT
Start: 2023-08-21 | End: 2023-08-21 | Stop reason: HOSPADM

## 2023-08-21 RX ORDER — BUPIVACAINE HYDROCHLORIDE 2.5 MG/ML
INJECTION, SOLUTION EPIDURAL; INFILTRATION; INTRACAUDAL PRN
Status: DISCONTINUED | OUTPATIENT
Start: 2023-08-21 | End: 2023-08-21 | Stop reason: ALTCHOICE

## 2023-08-21 RX ADMIN — SODIUM CHLORIDE: 9 INJECTION, SOLUTION INTRAVENOUS at 15:48

## 2023-08-21 RX ADMIN — PROPOFOL 50 MG: 10 INJECTION, EMULSION INTRAVENOUS at 07:10

## 2023-08-21 RX ADMIN — PHENYLEPHRINE HYDROCHLORIDE 100 MCG: 10 INJECTION INTRAVENOUS at 07:13

## 2023-08-21 RX ADMIN — PROTAMINE SULFATE 10 MG: 10 INJECTION, SOLUTION INTRAVENOUS at 09:03

## 2023-08-21 RX ADMIN — PHENYLEPHRINE HYDROCHLORIDE 50 MCG: 10 INJECTION INTRAVENOUS at 08:00

## 2023-08-21 RX ADMIN — Medication 5 MG: at 09:15

## 2023-08-21 RX ADMIN — FENTANYL CITRATE 100 MCG: 50 INJECTION, SOLUTION INTRAMUSCULAR; INTRAVENOUS at 06:59

## 2023-08-21 RX ADMIN — PROTAMINE SULFATE 10 MG: 10 INJECTION, SOLUTION INTRAVENOUS at 09:02

## 2023-08-21 RX ADMIN — Medication 10 MG: at 07:51

## 2023-08-21 RX ADMIN — PROTAMINE SULFATE 10 MG: 10 INJECTION, SOLUTION INTRAVENOUS at 09:01

## 2023-08-21 RX ADMIN — PROTAMINE SULFATE 10 MG: 10 INJECTION, SOLUTION INTRAVENOUS at 09:05

## 2023-08-21 RX ADMIN — PHENYLEPHRINE HYDROCHLORIDE 150 MCG: 10 INJECTION INTRAVENOUS at 07:08

## 2023-08-21 RX ADMIN — NICARDIPINE HYDROCHLORIDE 0.3 MG: 0.1 INJECTION INTRAVENOUS at 09:21

## 2023-08-21 RX ADMIN — HEPARIN SODIUM 10000 UNITS: 1000 INJECTION INTRAVENOUS; SUBCUTANEOUS at 07:46

## 2023-08-21 RX ADMIN — GLYCOPYRROLATE 0.8 MG: 0.2 INJECTION INTRAMUSCULAR; INTRAVENOUS at 09:15

## 2023-08-21 RX ADMIN — PHENYLEPHRINE HYDROCHLORIDE 100 MCG: 10 INJECTION INTRAVENOUS at 08:19

## 2023-08-21 RX ADMIN — NICARDIPINE HYDROCHLORIDE 0.4 MG: 0.1 INJECTION INTRAVENOUS at 09:23

## 2023-08-21 RX ADMIN — PHENYLEPHRINE HYDROCHLORIDE 100 MCG: 10 INJECTION INTRAVENOUS at 08:12

## 2023-08-21 RX ADMIN — PHENYLEPHRINE HYDROCHLORIDE 100 MCG: 10 INJECTION INTRAVENOUS at 07:34

## 2023-08-21 RX ADMIN — INSULIN LISPRO 6 UNITS: 100 INJECTION, SOLUTION INTRAVENOUS; SUBCUTANEOUS at 16:29

## 2023-08-21 RX ADMIN — SODIUM CHLORIDE: 9 INJECTION, SOLUTION INTRAVENOUS at 10:15

## 2023-08-21 RX ADMIN — METFORMIN HYDROCHLORIDE 1000 MG: 500 TABLET ORAL at 16:29

## 2023-08-21 RX ADMIN — SODIUM CHLORIDE, SODIUM LACTATE, POTASSIUM CHLORIDE, AND CALCIUM CHLORIDE: 600; 310; 30; 20 INJECTION, SOLUTION INTRAVENOUS at 07:15

## 2023-08-21 RX ADMIN — PHENYLEPHRINE HYDROCHLORIDE 100 MCG: 10 INJECTION INTRAVENOUS at 08:17

## 2023-08-21 RX ADMIN — LIDOCAINE HYDROCHLORIDE 100 MG: 20 INJECTION, SOLUTION EPIDURAL; INFILTRATION; INTRACAUDAL; PERINEURAL at 07:06

## 2023-08-21 RX ADMIN — HEPARIN SODIUM 3000 UNITS: 1000 INJECTION INTRAVENOUS; SUBCUTANEOUS at 08:28

## 2023-08-21 RX ADMIN — PROTAMINE SULFATE 10 MG: 10 INJECTION, SOLUTION INTRAVENOUS at 09:04

## 2023-08-21 RX ADMIN — CEFAZOLIN SODIUM 2000 MG: 100 INJECTION, POWDER, LYOPHILIZED, FOR SOLUTION INTRAVENOUS at 16:30

## 2023-08-21 RX ADMIN — PHENYLEPHRINE HYDROCHLORIDE 150 MCG: 10 INJECTION INTRAVENOUS at 08:04

## 2023-08-21 RX ADMIN — PHENYLEPHRINE HYDROCHLORIDE 100 MCG: 10 INJECTION INTRAVENOUS at 07:25

## 2023-08-21 RX ADMIN — PROPOFOL 120 MG: 10 INJECTION, EMULSION INTRAVENOUS at 07:06

## 2023-08-21 RX ADMIN — DEXAMETHASONE SODIUM PHOSPHATE 10 MG: 10 INJECTION INTRAMUSCULAR; INTRAVENOUS at 07:26

## 2023-08-21 RX ADMIN — NICARDIPINE HYDROCHLORIDE 5 MG/HR: 0.1 INJECTION INTRAVENOUS at 09:16

## 2023-08-21 RX ADMIN — GLIPIZIDE 10 MG: 5 TABLET ORAL at 16:30

## 2023-08-21 RX ADMIN — OXYCODONE HYDROCHLORIDE AND ACETAMINOPHEN 1 TABLET: 5; 325 TABLET ORAL at 20:37

## 2023-08-21 RX ADMIN — PHENYLEPHRINE HYDROCHLORIDE 100 MCG: 10 INJECTION INTRAVENOUS at 08:44

## 2023-08-21 RX ADMIN — GLYCOPYRROLATE 0.2 MG: 0.2 INJECTION INTRAMUSCULAR; INTRAVENOUS at 07:52

## 2023-08-21 RX ADMIN — ESMOLOL HYDROCHLORIDE 30 MG: 100 INJECTION, SOLUTION INTRAVENOUS at 07:06

## 2023-08-21 RX ADMIN — ESMOLOL HYDROCHLORIDE 40 MG: 100 INJECTION, SOLUTION INTRAVENOUS at 09:23

## 2023-08-21 RX ADMIN — Medication 5 MG: at 07:29

## 2023-08-21 RX ADMIN — ROCURONIUM BROMIDE 45 MG: 10 INJECTION, SOLUTION INTRAVENOUS at 07:06

## 2023-08-21 RX ADMIN — ROCURONIUM BROMIDE 5 MG: 10 INJECTION, SOLUTION INTRAVENOUS at 07:56

## 2023-08-21 RX ADMIN — SODIUM CHLORIDE, POTASSIUM CHLORIDE, SODIUM LACTATE AND CALCIUM CHLORIDE: 600; 310; 30; 20 INJECTION, SOLUTION INTRAVENOUS at 06:28

## 2023-08-21 RX ADMIN — Medication 2000 MG: at 07:15

## 2023-08-21 RX ADMIN — Medication 5 MG: at 07:31

## 2023-08-21 RX ADMIN — ESMOLOL HYDROCHLORIDE 30 MG: 100 INJECTION, SOLUTION INTRAVENOUS at 09:18

## 2023-08-21 RX ADMIN — PHENYLEPHRINE HYDROCHLORIDE 100 MCG: 10 INJECTION INTRAVENOUS at 07:47

## 2023-08-21 RX ADMIN — ONDANSETRON 4 MG: 2 INJECTION INTRAMUSCULAR; INTRAVENOUS at 09:14

## 2023-08-21 ASSESSMENT — PAIN SCALES - GENERAL
PAINLEVEL_OUTOF10: 0
PAINLEVEL_OUTOF10: 5
PAINLEVEL_OUTOF10: 0

## 2023-08-21 ASSESSMENT — PAIN DESCRIPTION - LOCATION: LOCATION: NECK

## 2023-08-21 ASSESSMENT — PAIN DESCRIPTION - ORIENTATION: ORIENTATION: RIGHT

## 2023-08-21 ASSESSMENT — PAIN - FUNCTIONAL ASSESSMENT: PAIN_FUNCTIONAL_ASSESSMENT: 0-10

## 2023-08-21 ASSESSMENT — PAIN DESCRIPTION - DESCRIPTORS: DESCRIPTORS: ACHING

## 2023-08-21 NOTE — OP NOTE
eversion technique to remove the plaque in one piece to leave a smooth endpoint. We did put two tacking stitches on the distal aspect. All remaining fine debris was removed with fine forceps. Using heparinized saline and dextran, we placed a patch. We then brought into field a bovine pericardial patch. We did a patch angioplasty using 6-0 Prolene proximally, ran on both sides and then ran it distally. Prior to that, we backflushed and forward flushed the common and the ICA. We restored flow first into the external, then to the common, then to the ICA. The patient had good Doppler signals on ICA, common, and external.  Completion carotid duplex study showed adequate waveform in the common, external and ICA. We then held pressure for 5 minutes, gave 50 mg of protamine. We closed with 2-0 Vicryl, 3-0 Vicryl and 4-0 Monocryl. The patient was extubated and returned to the PACU in stable condition.       MD RODRIGO [email or web address]  D:  08/21/2023 10:19  T:  08/21/2023 15:59  JOB #:  [id number]

## 2023-08-21 NOTE — BRIEF OP NOTE
[address] [facility]. [address]  [phone] FAX: [phone]    Brief Op Note Template Note    Pre-Op Diagnosis: Stenosis of right carotid artery [I65.21]    Post-Op Diagnosis:  * No post-op diagnosis entered *    Procedures: Procedure(s):  RIGHT CAROTID ENDARTERECTOMY    Surgeon: Fabian Interiano MD    Assistants: Surgeon(s):  Fabian Interiano MD      Anesthesia:  General     Findings: right ICA plaque    Tourniquet Time:  * No tourniquets in log *    Estimated Blood Loss:               Specimens:             Implants:    Implant Name Type Inv. Item Serial No.  Lot No. LRB No. Used Action   GRAFT VASC W0.8XL8CM THK0.35-0.75MM CAR PERICARD PROC BOV -  Vascular grafts GRAFT VASC W0.8XL8CM THK0.35-0.75MM CAR PERICARD PROC BOV 0000 [employer] [id number] Right 1 Implanted       Complications: None               Signed: Fabian Interiano MD      Elements of this note have been dictated using speech recognition software. As a result, errors of speech recognition may have occurred.

## 2023-08-21 NOTE — ANESTHESIA POSTPROCEDURE EVALUATION
Department of Anesthesiology  Postprocedure Note    Patient: Didier Connell  MRN: [id number]  YOB: 1950  Date of evaluation: 8/21/2023      Procedure Summary     Date: 08/21/23 Room / Location: [facility] MAIN OR 09 / [facility] MAIN OR    Anesthesia Start: 4285 Anesthesia Stop: 8463    Procedure: RIGHT CAROTID ENDARTERECTOMY (Right: Neck) Diagnosis:       Stenosis of right carotid artery      (Stenosis of right carotid artery [I65.21])    Providers: Louis Garcia MD Responsible Provider: Foreign Diaz MD    Anesthesia Type: General ASA Status: 3          Anesthesia Type: General    Sammy Phase I: Sammy Score: 10    Sammy Phase II:        Anesthesia Post Evaluation    Patient location during evaluation: ICU  Patient participation: complete - patient participated  Level of consciousness: awake  Airway patency: patent  Nausea & Vomiting: no nausea  Complications: no  Cardiovascular status: hypertensive (Cardene drip)  Respiratory status: acceptable and nasal cannula  Hydration status: euvolemic  Pain management: adequate

## 2023-08-21 NOTE — PROGRESS NOTES
Initial visit was made, prayer, emotional support and a spiritual presence were provided.      Lorraine Alvarado, [address], MRE

## 2023-08-21 NOTE — ANESTHESIA PROCEDURE NOTES
Airway  Date/Time: 8/21/2023 7:10 AM  Urgency: elective    Airway not difficult    General Information and Staff    Patient location during procedure: OR  Resident/CRNA: Ml Schultz APRN - CRNA  Performed: resident/CRNA     Indications and Patient Condition  Indications for airway management: anesthesia  Spontaneous Ventilation: absent  Sedation level: deep  Preoxygenated: yes  Patient position: sniffing  MILS not maintained throughout  Mask difficulty assessment: vent by bag mask    Final Airway Details  Final airway type: endotracheal airway      Successful airway: ETT  Cuffed: yes   Successful intubation technique: direct laryngoscopy  Endotracheal tube insertion site: oral  Blade: Peterson  Blade size: #3  ETT size (mm): 8.0  Cormack-Lehane Classification: grade I - full view of glottis  Placement verified by: chest auscultation and capnometry   Inital cuff pressure (cm H2O): 25  Measured from: lips  ETT to lips (cm): 22  Number of attempts at approach: 1  Number of other approaches attempted: 0    no

## 2023-08-21 NOTE — CARE COORDINATION
Chart screened by  for discharge planning. No needs identified at this time. Please consult  if any new issues arise.        08/21/23 6212   Service Assessment   Support Systems Spouse/Significant Other;Children   Discharge Planning   Type of Residence House   Living Arrangements Spouse/Significant Other   Current Services Prior To Admission None   Potential Assistance Needed N/A   Type of Home Care Services None   Patient expects to be discharged to: House   Condition of Participation: Discharge Planning   The Plan for Transition of Care is related to the following treatment goals: Anticipate return home

## 2023-08-21 NOTE — ANESTHESIA PROCEDURE NOTES
Arterial Line:    An arterial line was placed using ultrasound guidance, in the OR for the following indication(s): continuous blood pressure monitoring and blood sampling needed. A 20 gauge (size) (length), Arrow (type) catheter was placed, Seldinger technique used, into the left radial artery, secured by Tegaderm and tape. Anesthesia type: Local  Local infiltration: Injection    Events:  patient tolerated procedure well with no complications and EBL < 5mL. Additional notes:  Left arm prepped with ChloraPrep, 0.8ml of 1% lidocaine infiltrated at skin, ultrasound guided Seldinger technique, good blood return, good waveform. Potential access sites were examined with ultrasound and acceptable patent access site selected as noted above. Needle path and artery access visualized in real time using ultrasound, an image of wire in vessel recorded for permanent record.     8/21/2023 7:03 AM8/21/2023 7:05 AM  Resident/CRNA: HALI Rangel - CRNA  Performed: Resident/CRNA   Preanesthetic Checklist  Completed: patient identified, IV checked, site marked, risks and benefits discussed, surgical/procedural consents, equipment checked, pre-op evaluation, timeout performed, anesthesia consent given, oxygen available, monitors applied/VS acknowledged, fire risk safety assessment completed and verbalized and blood product R/B/A discussed and consented

## 2023-08-21 NOTE — PERIOP NOTE
TRANSFER - OUT REPORT:    Verbal report given to Nara iSerra on Neetu Herrera  being transferred to CVICU for routine post-op       Report consisted of patient's Situation, Background, Assessment and   Recommendations(SBAR). Information from the following report(s) Nurse Handoff Report, Surgery Report, and Neuro Assessment was reviewed with the receiving nurse. Lines:   Peripheral IV 08/21/23 Left Hand (Active)       Peripheral IV 08/21/23 Right Hand (Active)       Arterial Line 08/21/23 Radial (Active)        Opportunity for questions and clarification was provided.       Patient transported with:  Monitor, O2 @ 4lpm, and Registered [facility]

## 2023-08-22 VITALS
DIASTOLIC BLOOD PRESSURE: 74 MMHG | HEART RATE: 88 BPM | SYSTOLIC BLOOD PRESSURE: 165 MMHG | TEMPERATURE: 98.1 F | HEIGHT: 70 IN | OXYGEN SATURATION: 96 % | BODY MASS INDEX: 23.62 KG/M2 | RESPIRATION RATE: 24 BRPM | WEIGHT: 165 LBS

## 2023-08-22 LAB
GLUCOSE BLD STRIP.AUTO-MCNC: 141 MG/DL (ref 65–100)
SERVICE CMNT-IMP: ABNORMAL

## 2023-08-22 PROCEDURE — 6360000002 HC RX W HCPCS: Performed by: SURGERY

## 2023-08-22 PROCEDURE — 99024 POSTOP FOLLOW-UP VISIT: CPT | Performed by: SURGERY

## 2023-08-22 PROCEDURE — 6370000000 HC RX 637 (ALT 250 FOR IP): Performed by: SURGERY

## 2023-08-22 PROCEDURE — 2580000003 HC RX 258: Performed by: SURGERY

## 2023-08-22 PROCEDURE — 82962 GLUCOSE BLOOD TEST: CPT

## 2023-08-22 RX ORDER — OXYCODONE HYDROCHLORIDE AND ACETAMINOPHEN 5; 325 MG/1; MG/1
1 TABLET ORAL EVERY 4 HOURS PRN
Qty: 30 TABLET | Refills: 0 | Status: SHIPPED | OUTPATIENT
Start: 2023-08-22 | End: 2023-08-25

## 2023-08-22 RX ADMIN — METFORMIN HYDROCHLORIDE 1000 MG: 500 TABLET ORAL at 07:16

## 2023-08-22 RX ADMIN — METOPROLOL SUCCINATE 25 MG: 25 TABLET, EXTENDED RELEASE ORAL at 08:42

## 2023-08-22 RX ADMIN — ASPIRIN 81 MG: 81 TABLET ORAL at 08:42

## 2023-08-22 RX ADMIN — OXYCODONE HYDROCHLORIDE AND ACETAMINOPHEN 1 TABLET: 5; 325 TABLET ORAL at 07:16

## 2023-08-22 RX ADMIN — CEFAZOLIN SODIUM 2000 MG: 100 INJECTION, POWDER, LYOPHILIZED, FOR SOLUTION INTRAVENOUS at 00:42

## 2023-08-22 RX ADMIN — GLIPIZIDE 10 MG: 5 TABLET ORAL at 06:25

## 2023-08-22 RX ADMIN — CEFAZOLIN SODIUM 2000 MG: 100 INJECTION, POWDER, LYOPHILIZED, FOR SOLUTION INTRAVENOUS at 08:42

## 2023-08-22 ASSESSMENT — PAIN SCALES - GENERAL
PAINLEVEL_OUTOF10: 6
PAINLEVEL_OUTOF10: 0

## 2023-08-22 ASSESSMENT — PAIN DESCRIPTION - FREQUENCY: FREQUENCY: INTERMITTENT

## 2023-08-22 ASSESSMENT — PAIN - FUNCTIONAL ASSESSMENT: PAIN_FUNCTIONAL_ASSESSMENT: ACTIVITIES ARE NOT PREVENTED

## 2023-08-22 ASSESSMENT — PAIN DESCRIPTION - ONSET: ONSET: GRADUAL

## 2023-08-22 ASSESSMENT — PAIN DESCRIPTION - LOCATION: LOCATION: HEAD

## 2023-08-22 ASSESSMENT — PAIN DESCRIPTION - ORIENTATION: ORIENTATION: UPPER

## 2023-08-22 ASSESSMENT — PAIN DESCRIPTION - DESCRIPTORS: DESCRIPTORS: ACHING

## 2023-08-22 ASSESSMENT — PAIN DESCRIPTION - PAIN TYPE: TYPE: ACUTE PAIN

## 2023-08-22 NOTE — PROGRESS NOTES
Discharge instructions given and reviewed; pt v/u. Discharged to home via wheelchair, accompanied by son.

## 2023-08-22 NOTE — CARE COORDINATION
Pt with discharge orders this day. Chart screened by  for discharge planning. No CM needs identified at time of discharge. Milestones met.          08/21/23 1158   Service Assessment   Patient Orientation Alert and Oriented   Cognition Alert   History Provided By Medical Record   Primary Caregiver Self   Support Systems Spouse/Significant Other;Children   PCP Verified by CM Yes   Last Visit to PCP Within last year   Prior Functional Level Independent in ADLs/IADLs   Current Functional Level Independent in ADLs/IADLs   Can patient return to prior living arrangement Yes   Ability to make needs known: Good   Family able to assist with home care needs: Yes   Would you like for me to discuss the discharge plan with any other family members/significant others, and if so, who?  No   Discharge Planning   Type of Residence House   Living Arrangements Spouse/Significant Other   Current Services Prior To Admission None   Potential Assistance Needed N/A   Type of Home Care Services None   Patient expects to be discharged to: House   Condition of Participation: Discharge Planning   The Plan for Transition of Care is related to the following treatment goals: Return home

## 2023-08-22 NOTE — DISCHARGE INSTRUCTIONS
Carotid Endarterectomy: What to Expect at [address] [place] [address]  A carotid endarterectomy (say \"huma-RAW-miked ss-zth-rqk-GRZEGORZK-telloh-adore\") is surgery to remove fatty build-up (plaque) from one of the carotid arteries. Your doctor made a cut (incision) in your neck and carotid artery to take out the plaque. You may have a sore throat for a few days. You can expect the incision to be sore for about a week. The area around it may also be swollen and bruised at first. The area in front of the incision may be numb. This usually gets better after several months. Your doctor closed the incision in your neck with stitches. The stitches will be removed 7 to 10 days after surgery, or you may have stitches that dissolve on their own. You may feel more tired than usual for several weeks after surgery. You can do light activities around the house. But don't do anything strenuous until your doctor says it is okay. This may be for at least 2 weeks. You will have regular tests to check blood flow in your carotid arteries. This care sheet gives you a general idea about how long it will take for you to recover. But each person recovers at a different pace. Follow the steps below to feel better as quickly as possible. How can you care for yourself at home? Activity    Rest when you feel tired. Getting enough sleep will help you recover. Try to walk each day. Start by walking a little more than you did the day before. Bit by bit, increase the amount you walk. Walking boosts blood flow and helps prevent pneumonia and constipation. Avoid strenuous activities, such as bicycle riding, jogging, weight lifting, or aerobic exercise. Your doctor will tell you when it's okay to do strenuous activity. For at least 2 weeks, avoid lifting anything that would make you strain. This may include a child, heavy grocery bags and milk containers, a heavy briefcase or backpack, cat litter or dog food bags, or a vacuum .      Ask your the incision dry. Wash the area daily with water and pat it dry. Other cleaning products, such as hydrogen peroxide, can make the wound heal more slowly. You may cover the area with a gauze bandage if it weeps or rubs against clothing. Change the bandage every day. Keep the area clean and dry. Follow-up care is a key part of your treatment and safety. Be sure to make and go to all appointments, and call your doctor if you are having problems. It's also a good idea to know your test results and keep a list of the medicines you take. When should you call for help? Call 911 anytime you think you may need emergency care. For example, call if:    You passed out (lost consciousness). You have severe trouble breathing. You have a tight bulge in your neck on the side where the surgery was done. You have symptoms of a stroke. These may include:  Sudden numbness, tingling, weakness, or loss of movement in your face, arm, or leg, especially on only one side of your body. Sudden vision changes. Sudden trouble speaking. Sudden confusion or trouble understanding simple statements. Sudden problems with walking or balance. A sudden, severe headache that is different from past headaches. You have symptoms of a heart attack. These may include:  Chest pain or pressure, or a strange feeling in the chest.  Sweating. Shortness of breath. Nausea or vomiting. Pain, pressure, or a strange feeling in the back, neck, jaw, or upper belly, or in one or both shoulders or arms. Lightheadedness or sudden weakness. A fast or irregular heartbeat. After calling 911, chew 1 adult-strength or 2 to 4 low-dose aspirin. Wait for an ambulance. Do not try to drive yourself. Call your doctor now or seek immediate medical care if:    You have pain that does not get better after you take pain medicine. You have loose stitches, or your incision comes open.      Bright red blood has soaked through the bandage over

## 2023-09-05 ENCOUNTER — OFFICE VISIT (OUTPATIENT)
Dept: VASCULAR SURGERY | Age: 73
End: 2023-09-05
Payer: MEDICARE

## 2023-09-05 VITALS
OXYGEN SATURATION: 98 % | SYSTOLIC BLOOD PRESSURE: 157 MMHG | BODY MASS INDEX: 23.19 KG/M2 | HEART RATE: 88 BPM | WEIGHT: 162 LBS | DIASTOLIC BLOOD PRESSURE: 74 MMHG | HEIGHT: 70 IN

## 2023-09-05 DIAGNOSIS — I73.9 PVD (PERIPHERAL VASCULAR DISEASE) WITH CLAUDICATION (HCC): Primary | ICD-10-CM

## 2023-09-05 PROCEDURE — 1036F TOBACCO NON-USER: CPT | Performed by: SURGERY

## 2023-09-05 PROCEDURE — G8420 CALC BMI NORM PARAMETERS: HCPCS | Performed by: SURGERY

## 2023-09-05 PROCEDURE — G8427 DOCREV CUR MEDS BY ELIG CLIN: HCPCS | Performed by: SURGERY

## 2023-09-05 PROCEDURE — 1123F ACP DISCUSS/DSCN MKR DOCD: CPT | Performed by: SURGERY

## 2023-09-05 PROCEDURE — 3077F SYST BP >= 140 MM HG: CPT | Performed by: SURGERY

## 2023-09-05 PROCEDURE — 99213 OFFICE O/P EST LOW 20 MIN: CPT | Performed by: SURGERY

## 2023-09-05 PROCEDURE — 1111F DSCHRG MED/CURRENT MED MERGE: CPT | Performed by: SURGERY

## 2023-09-05 PROCEDURE — 3078F DIAST BP <80 MM HG: CPT | Performed by: SURGERY

## 2023-09-05 PROCEDURE — 3017F COLORECTAL CA SCREEN DOC REV: CPT | Performed by: SURGERY

## 2023-09-05 NOTE — PROGRESS NOTES
[address]   [address] FAX: [phone]    Mervin Fontaine  : 1950    Chief Complaint:     History of Present Illness:   Patient follows up today for follow-up status post right carotid arterectomy. Patient denies any neurological symptoms. CURRENT MEDICATIONS:  Current Outpatient Medications   Medication Sig Dispense Refill    losartan (COZAAR) 50 MG tablet Take 1 tablet by mouth nightly      magnesium oxide (MAG-OX) 400 (240 Mg) MG tablet Take 1 tablet by mouth 2 times daily      UNABLE TO FIND Take 1 tablet by mouth daily Blood Pressure Support Vitamin      metoprolol succinate (TOPROL XL) 50 MG extended release tablet Take 0.5 tablets by mouth daily      turmeric 500 MG CAPS Take 2 capsules by mouth daily      acetaminophen (TYLENOL) 325 MG tablet Take 2 tablets by mouth every 6 hours as needed for Pain 120 tablet 3    Multiple Vitamins-Minerals (MULTIVITAMIN WITH MINERALS) tablet Take 1 tablet by mouth daily      glipiZIDE (GLUCOTROL) 10 MG tablet Take 1 tablet by mouth 2 times daily (before meals) 60 tablet     alogliptin (NESINA) 12.5 MG TABS tablet Take 1 tablet by mouth daily      aspirin 81 MG EC tablet Take 1 tablet by mouth daily      cyanocobalamin 1000 MCG tablet Take 1 tablet by mouth nightly      ferrous sulfate (IRON 325) 325 (65 Fe) MG tablet Take 1 tablet by mouth daily (with breakfast)      finasteride (PROSCAR) 5 MG tablet Take 1 tablet by mouth daily      HYDROcodone-acetaminophen (NORCO)  MG per tablet Take 1 tablet by mouth in the morning and at bedtime.       insulin glargine (LANTUS;BASAGLAR) 100 UNIT/ML injection pen Inject 18 Units into the skin nightly      SURE COMFORT PEN NEEDLES 32G X 6 MM MISC       metFORMIN (GLUCOPHAGE) 1000 MG tablet Take 1 tablet by mouth 2 times daily (with meals)      rosuvastatin (CRESTOR) 40 MG tablet Take 1 tablet by mouth nightly      naloxone 4 MG/0.1ML LIQD nasal spray 1 spray once as

## 2023-09-19 ENCOUNTER — APPOINTMENT (RX ONLY)
Dept: URBAN - NONMETROPOLITAN AREA CLINIC 1 | Facility: CLINIC | Age: 73
Setting detail: DERMATOLOGY
End: 2023-09-19

## 2023-09-19 DIAGNOSIS — Z85.828 PERSONAL HISTORY OF OTHER MALIGNANT NEOPLASM OF SKIN: ICD-10-CM | Status: RESOLVED

## 2023-09-19 DIAGNOSIS — L85.3 XEROSIS CUTIS: ICD-10-CM

## 2023-09-19 DIAGNOSIS — L57.0 ACTINIC KERATOSIS: ICD-10-CM | Status: INADEQUATELY CONTROLLED

## 2023-09-19 DIAGNOSIS — D22 MELANOCYTIC NEVI: ICD-10-CM

## 2023-09-19 DIAGNOSIS — L82.1 OTHER SEBORRHEIC KERATOSIS: ICD-10-CM

## 2023-09-19 PROBLEM — D22.9 MELANOCYTIC NEVI, UNSPECIFIED: Status: ACTIVE | Noted: 2023-09-19

## 2023-09-19 PROCEDURE — ? FULL BODY SKIN EXAM

## 2023-09-19 PROCEDURE — ? SUNSCREEN RECOMMENDATIONS

## 2023-09-19 PROCEDURE — ? MDM - TREATMENT GOALS

## 2023-09-19 PROCEDURE — 17003 DESTRUCT PREMALG LES 2-14: CPT

## 2023-09-19 PROCEDURE — ? PRESCRIPTION

## 2023-09-19 PROCEDURE — ? COUNSELING

## 2023-09-19 PROCEDURE — ? LIQUID NITROGEN

## 2023-09-19 PROCEDURE — ? TREATMENT REGIMEN

## 2023-09-19 PROCEDURE — 17000 DESTRUCT PREMALG LESION: CPT

## 2023-09-19 PROCEDURE — ? PRESCRIPTION MEDICATION MANAGEMENT

## 2023-09-19 PROCEDURE — 99214 OFFICE O/P EST MOD 30 MIN: CPT | Mod: 25

## 2023-09-19 RX ORDER — FLUOROURACIL 5 MG/G
CREAM TOPICAL
Qty: 40 | Refills: 1 | Status: ERX

## 2023-09-19 ASSESSMENT — LOCATION SIMPLE DESCRIPTION DERM
LOCATION SIMPLE: LEFT UPPER ARM
LOCATION SIMPLE: LEFT CHEEK
LOCATION SIMPLE: RIGHT PRETIBIAL REGION
LOCATION SIMPLE: RIGHT HAND
LOCATION SIMPLE: RIGHT EAR
LOCATION SIMPLE: RIGHT FOREARM
LOCATION SIMPLE: NOSE
LOCATION SIMPLE: RIGHT WRIST
LOCATION SIMPLE: LEFT FOREARM
LOCATION SIMPLE: LEFT ZYGOMA
LOCATION SIMPLE: RIGHT UPPER BACK
LOCATION SIMPLE: LEFT PRETIBIAL REGION

## 2023-09-19 ASSESSMENT — LOCATION DETAILED DESCRIPTION DERM
LOCATION DETAILED: RIGHT NASAL DORSUM
LOCATION DETAILED: RIGHT SUPERIOR HELIX
LOCATION DETAILED: RIGHT PROXIMAL RADIAL DORSAL FOREARM
LOCATION DETAILED: RIGHT RADIAL DORSAL HAND
LOCATION DETAILED: LEFT SUPERIOR CENTRAL MALAR CHEEK
LOCATION DETAILED: LEFT SUPERIOR LATERAL MALAR CHEEK
LOCATION DETAILED: RIGHT MID-UPPER BACK
LOCATION DETAILED: LEFT DISTAL POSTERIOR UPPER ARM
LOCATION DETAILED: LEFT DISTAL DORSAL FOREARM
LOCATION DETAILED: RIGHT DORSAL WRIST
LOCATION DETAILED: LEFT DISTAL PRETIBIAL REGION
LOCATION DETAILED: LEFT NASAL DORSUM
LOCATION DETAILED: LEFT CENTRAL ZYGOMA
LOCATION DETAILED: RIGHT PROXIMAL PRETIBIAL REGION

## 2023-09-19 ASSESSMENT — LOCATION ZONE DERM
LOCATION ZONE: TRUNK
LOCATION ZONE: EAR
LOCATION ZONE: HAND
LOCATION ZONE: ARM
LOCATION ZONE: FACE
LOCATION ZONE: NOSE
LOCATION ZONE: LEG

## 2023-09-19 NOTE — PROCEDURE: LIQUID NITROGEN
Detail Level: Detailed
Consent: The patient's consent was obtained including but not limited to risks of crusting, scabbing, blistering, scarring, darker or lighter pigmentary change, recurrence, incomplete removal and infection.
Duration Of Freeze Thaw-Cycle (Seconds): 5
Show Applicator Variable?: Yes
Render Note In Bullet Format When Appropriate: No
Post-Care Instructions: I reviewed with the patient in detail post-care instructions. Patient is to wear sunprotection, and avoid picking at any of the treated lesions. Pt may apply Vaseline to crusted or scabbing areas.

## 2023-09-19 NOTE — PROCEDURE: PRESCRIPTION MEDICATION MANAGEMENT
Detail Level: Zone
Render In Strict Bullet Format?: No
Plan: Continue eucerin cream bid to dry skin areas.
Continue Regimen: Recommended treating fluorouracil cream to places on forearm and hand after they peel off from freezing and follow up in 3 months.

## 2023-12-01 PROBLEM — I20.0 ACCELERATING ANGINA (HCC): Status: RESOLVED | Noted: 2022-12-07 | Resolved: 2023-12-01

## 2023-12-01 NOTE — PROGRESS NOTES
Physical Exam  Constitutional:       Appearance: Normal appearance. HENT:      Head: Normocephalic and atraumatic. Eyes:      Conjunctiva/sclera: Conjunctivae normal.   Neck:      Vascular: No carotid bruit. Cardiovascular:      Rate and Rhythm: Normal rate and regular rhythm. Heart sounds: No murmur heard. No friction rub. No gallop. Pulmonary:      Effort: No respiratory distress. Breath sounds: No wheezing or rales. Musculoskeletal:         General: No swelling. Cervical back: Neck supple. Skin:     General: Skin is warm and dry. Neurological:      General: No focal deficit present. Mental Status: He is alert. Psychiatric:         Mood and Affect: Mood normal.         Behavior: Behavior normal.         Medical problems and test results were reviewed with the patient today. DATA REVIEW        BMP  Lab Results   Component Value Date/Time     02/17/2023 11:09 AM    K 3.9 02/20/2023 05:05 AM     02/17/2023 11:09 AM    CO2 26 02/17/2023 11:09 AM    BUN 19 02/17/2023 11:09 AM    CREATININE 1.00 08/18/2023 08:14 AM    CREATININE 1.20 02/17/2023 11:09 AM    GLUCOSE 312 02/17/2023 11:09 AM    CALCIUM 9.3 02/17/2023 11:09 AM          EKG        CXR/IMAGING        DEVICE INTERROGATION        OUTSIDE RECORDS REVIEW    Records from outside providers have been reviewed and summarized as noted in the HPI, past history and data review sections of this note, and reviewed with patient. .       ASSESSMENT and Catherine Urias was seen today for follow-up. Diagnoses and all orders for this visit:    Coronary artery disease of native artery of native heart with stable angina pectoris (HCC)    S/P CABG x 3    Type 2 diabetes mellitus without complication, with long-term current use of insulin (HCC)    Carotid stenosis, asymptomatic, right    Essential hypertension    Dyslipidemia          IMPRESSION:    Stable EF, NYHA II.   Intolerant of SGLT2i, has tried multiple

## 2023-12-04 ENCOUNTER — OFFICE VISIT (OUTPATIENT)
Age: 73
End: 2023-12-04
Payer: MEDICARE

## 2023-12-04 VITALS
BODY MASS INDEX: 23.77 KG/M2 | SYSTOLIC BLOOD PRESSURE: 138 MMHG | HEIGHT: 70 IN | DIASTOLIC BLOOD PRESSURE: 68 MMHG | WEIGHT: 166 LBS | HEART RATE: 86 BPM

## 2023-12-04 DIAGNOSIS — I65.21 CAROTID STENOSIS, ASYMPTOMATIC, RIGHT: ICD-10-CM

## 2023-12-04 DIAGNOSIS — I25.118 CORONARY ARTERY DISEASE OF NATIVE ARTERY OF NATIVE HEART WITH STABLE ANGINA PECTORIS (HCC): Primary | Chronic | ICD-10-CM

## 2023-12-04 DIAGNOSIS — Z95.1 S/P CABG X 3: ICD-10-CM

## 2023-12-04 DIAGNOSIS — I10 ESSENTIAL HYPERTENSION: Chronic | ICD-10-CM

## 2023-12-04 DIAGNOSIS — E78.5 DYSLIPIDEMIA: ICD-10-CM

## 2023-12-04 DIAGNOSIS — E11.9 TYPE 2 DIABETES MELLITUS WITHOUT COMPLICATION, WITH LONG-TERM CURRENT USE OF INSULIN (HCC): Chronic | ICD-10-CM

## 2023-12-04 DIAGNOSIS — Z79.4 TYPE 2 DIABETES MELLITUS WITHOUT COMPLICATION, WITH LONG-TERM CURRENT USE OF INSULIN (HCC): Chronic | ICD-10-CM

## 2023-12-04 PROCEDURE — 1036F TOBACCO NON-USER: CPT | Performed by: INTERNAL MEDICINE

## 2023-12-04 PROCEDURE — 3075F SYST BP GE 130 - 139MM HG: CPT | Performed by: INTERNAL MEDICINE

## 2023-12-04 PROCEDURE — 3052F HG A1C>EQUAL 8.0%<EQUAL 9.0%: CPT | Performed by: INTERNAL MEDICINE

## 2023-12-04 PROCEDURE — G8484 FLU IMMUNIZE NO ADMIN: HCPCS | Performed by: INTERNAL MEDICINE

## 2023-12-04 PROCEDURE — 99214 OFFICE O/P EST MOD 30 MIN: CPT | Performed by: INTERNAL MEDICINE

## 2023-12-04 PROCEDURE — 2022F DILAT RTA XM EVC RTNOPTHY: CPT | Performed by: INTERNAL MEDICINE

## 2023-12-04 PROCEDURE — G8427 DOCREV CUR MEDS BY ELIG CLIN: HCPCS | Performed by: INTERNAL MEDICINE

## 2023-12-04 PROCEDURE — G8420 CALC BMI NORM PARAMETERS: HCPCS | Performed by: INTERNAL MEDICINE

## 2023-12-04 PROCEDURE — 1123F ACP DISCUSS/DSCN MKR DOCD: CPT | Performed by: INTERNAL MEDICINE

## 2023-12-04 PROCEDURE — 3078F DIAST BP <80 MM HG: CPT | Performed by: INTERNAL MEDICINE

## 2023-12-04 PROCEDURE — 3017F COLORECTAL CA SCREEN DOC REV: CPT | Performed by: INTERNAL MEDICINE

## 2023-12-08 NOTE — PROCEDURE: MOHS SURGERY
Physical therapy Hemigard Postcare Instructions: The HEMIGARD strips are to remain completely dry for at least 5-7 days.

## 2024-02-01 ENCOUNTER — APPOINTMENT (RX ONLY)
Dept: URBAN - NONMETROPOLITAN AREA CLINIC 1 | Facility: CLINIC | Age: 74
Setting detail: DERMATOLOGY
End: 2024-02-01

## 2024-02-01 DIAGNOSIS — Z85.828 PERSONAL HISTORY OF OTHER MALIGNANT NEOPLASM OF SKIN: ICD-10-CM | Status: RESOLVED

## 2024-02-01 DIAGNOSIS — L57.0 ACTINIC KERATOSIS: ICD-10-CM

## 2024-02-01 PROCEDURE — 99214 OFFICE O/P EST MOD 30 MIN: CPT

## 2024-02-01 PROCEDURE — ? MEDICATION COUNSELING

## 2024-02-01 PROCEDURE — ? SUNSCREEN RECOMMENDATIONS

## 2024-02-01 PROCEDURE — ? PRESCRIPTION MEDICATION MANAGEMENT

## 2024-02-01 PROCEDURE — ? MDM - TREATMENT GOALS

## 2024-02-01 PROCEDURE — ? COUNSELING

## 2024-02-01 PROCEDURE — ? PHOTO-DOCUMENTATION

## 2024-02-01 PROCEDURE — ? FULL BODY SKIN EXAM

## 2024-02-01 PROCEDURE — ? TREATMENT REGIMEN

## 2024-02-01 ASSESSMENT — LOCATION SIMPLE DESCRIPTION DERM
LOCATION SIMPLE: RIGHT TEMPLE
LOCATION SIMPLE: NOSE
LOCATION SIMPLE: LEFT FOREARM

## 2024-02-01 ASSESSMENT — LOCATION ZONE DERM
LOCATION ZONE: FACE
LOCATION ZONE: NOSE
LOCATION ZONE: ARM

## 2024-02-01 ASSESSMENT — LOCATION DETAILED DESCRIPTION DERM
LOCATION DETAILED: RIGHT NASAL DORSUM
LOCATION DETAILED: NASAL DORSUM
LOCATION DETAILED: LEFT DISTAL DORSAL FOREARM
LOCATION DETAILED: RIGHT MID TEMPLE

## 2024-02-01 NOTE — PROCEDURE: PRESCRIPTION MEDICATION MANAGEMENT
Detail Level: Zone
Continue Regimen: Fluorouracil 5% topical cream AA nose/face BID x2 weeks then stop
Render In Strict Bullet Format?: No

## 2024-02-01 NOTE — PROCEDURE: MEDICATION COUNSELING
Ivermectin Pregnancy And Lactation Text: This medication is Pregnancy Category C and it isn't known if it is safe during pregnancy. It is also excreted in breast milk.
Stelara Counseling:  I discussed with the patient the risks of ustekinumab including but not limited to immunosuppression, malignancy, posterior leukoencephalopathy syndrome, and serious infections.  The patient understands that monitoring is required including a PPD at baseline and must alert us or the primary physician if symptoms of infection or other concerning signs are noted.
Opioid Pregnancy And Lactation Text: These medications can lead to premature delivery and should be avoided during pregnancy. These medications are also present in breast milk in small amounts.
Infliximab Pregnancy And Lactation Text: This medication is Pregnancy Category B and is considered safe during pregnancy. It is unknown if this medication is excreted in breast milk.
5-Fu Pregnancy And Lactation Text: This medication is Pregnancy Category X and contraindicated in pregnancy and in women who may become pregnant. It is unknown if this medication is excreted in breast milk.
Thalidomide Counseling: I discussed with the patient the risks of thalidomide including but not limited to birth defects, anxiety, weakness, chest pain, dizziness, cough and severe allergy.
Bexarotene Counseling:  I discussed with the patient the risks of bexarotene including but not limited to hair loss, dry lips/skin/eyes, liver abnormalities, hyperlipidemia, pancreatitis, depression/suicidal ideation, photosensitivity, drug rash/allergic reactions, hypothyroidism, anemia, leukopenia, infection, cataracts, and teratogenicity.  Patient understands that they will need regular blood tests to check lipid profile, liver function tests, white blood cell count, thyroid function tests and pregnancy test if applicable.
Minocycline Pregnancy And Lactation Text: This medication is Pregnancy Category D and not consider safe during pregnancy. It is also excreted in breast milk.
Oral Minoxidil Pregnancy And Lactation Text: This medication should only be used when clearly needed if you are pregnant, attempting to become pregnant or breast feeding.
Dupixent Counseling: I discussed with the patient the risks of dupilumab including but not limited to eye infection and irritation, cold sores, injection site reactions, worsening of asthma, allergic reactions and increased risk of parasitic infection.  Live vaccines should be avoided while taking dupilumab. Dupilumab will also interact with certain medications such as warfarin and cyclosporine. The patient understands that monitoring is required and they must alert us or the primary physician if symptoms of infection or other concerning signs are noted.
Topical Metronidazole Counseling: Metronidazole is a topical antibiotic medication. You may experience burning, stinging, redness, or allergic reactions.  Please call our office if you develop any problems from using this medication.
Erivedge Pregnancy And Lactation Text: This medication is Pregnancy Category X and is absolutely contraindicated during pregnancy. It is unknown if it is excreted in breast milk.
Protopic Counseling: Patient may experience a mild burning sensation during topical application. Protopic is not approved in children less than 2 years of age. There have been case reports of hematologic and skin malignancies in patients using topical calcineurin inhibitors although causality is questionable.
Methotrexate Pregnancy And Lactation Text: This medication is Pregnancy Category X and is known to cause fetal harm. This medication is excreted in breast milk.
Clindamycin Pregnancy And Lactation Text: This medication can be used in pregnancy if certain situations. Clindamycin is also present in breast milk.
Azathioprine Counseling:  I discussed with the patient the risks of azathioprine including but not limited to myelosuppression, immunosuppression, hepatotoxicity, lymphoma, and infections.  The patient understands that monitoring is required including baseline LFTs, Creatinine, possible TPMP genotyping and weekly CBCs for the first month and then every 2 weeks thereafter.  The patient verbalized understanding of the proper use and possible adverse effects of azathioprine.  All of the patient's questions and concerns were addressed.
Soolantra Pregnancy And Lactation Text: This medication is Pregnancy Category C. This medication is considered safe during breast feeding.
Winlevi Pregnancy And Lactation Text: This medication is considered safe during pregnancy and breastfeeding.
Finasteride Female Counseling: Finasteride Counseling:  I discussed with the patient the risks of use of finasteride including but not limited to decreased libido and sexual dysfunction. Explained the teratogenic nature of the medication and stressed the importance of not getting pregnant during treatment. All of the patient's questions and concerns were addressed.
Imiquimod Pregnancy And Lactation Text: This medication is Pregnancy Category C. It is unknown if this medication is excreted in breast milk.
Cimetidine Pregnancy And Lactation Text: This medication is Pregnancy Category B and is considered safe during pregnancy. It is also excreted in breast milk and breast feeding isn't recommended.
Rinvoq Counseling: I discussed with the patient the risks of Rinvoq therapy including but not limited to upper respiratory tract infections, shingles, cold sores, bronchitis, nausea, cough, fever, acne, and headache. Live vaccines should be avoided.  This medication has been linked to serious infections; higher rate of mortality; malignancy and lymphoproliferative disorders; major adverse cardiovascular events; thrombosis; thrombocytopenia, anemia, and neutropenia; lipid elevations; liver enzyme elevations; and gastrointestinal perforations.
Gabapentin Counseling: I discussed with the patient the risks of gabapentin including but not limited to dizziness, somnolence, fatigue and ataxia.
Fluconazole Counseling:  Patient counseled regarding adverse effects of fluconazole including but not limited to headache, diarrhea, nausea, upset stomach, liver function test abnormalities, taste disturbance, and stomach pain.  There is a rare possibility of liver failure that can occur when taking fluconazole.  The patient understands that monitoring of LFTs and kidney function test may be required, especially at baseline. The patient verbalized understanding of the proper use and possible adverse effects of fluconazole.  All of the patient's questions and concerns were addressed.
Bexarotene Pregnancy And Lactation Text: This medication is Pregnancy Category X and should not be given to women who are pregnant or may become pregnant. This medication should not be used if you are breast feeding.
Otezla Counseling: The side effects of Otezla were discussed with the patient, including but not limited to worsening or new depression, weight loss, diarrhea, nausea, upper respiratory tract infection, and headache. Patient instructed to call the office should any adverse effect occur.  The patient verbalized understanding of the proper use and possible adverse effects of Otezla.  All the patient's questions and concerns were addressed.
Azelaic Acid Pregnancy And Lactation Text: This medication is considered safe during pregnancy and breast feeding.
Low Dose Naltrexone Pregnancy And Lactation Text: Naltrexone is pregnancy category C.  There have been no adequate and well-controlled studies in pregnant women.  It should be used in pregnancy only if the potential benefit justifies the potential risk to the fetus.   Limited data indicates that naltrexone is minimally excreted into breastmilk.
Drysol Counseling:  I discussed with the patient the risks of drysol/aluminum chloride including but not limited to skin rash, itching, irritation, burning.
Quinolones Counseling:  I discussed with the patient the risks of fluoroquinolones including but not limited to GI upset, allergic reaction, drug rash, diarrhea, dizziness, photosensitivity, yeast infections, liver function test abnormalities, tendonitis/tendon rupture.
Rituxan Counseling:  I discussed with the patient the risks of Rituxan infusions. Side effects can include infusion reactions, severe drug rashes including mucocutaneous reactions, reactivation of latent hepatitis and other infections and rarely progressive multifocal leukoencephalopathy.  All of the patient's questions and concerns were addressed.
Dupixent Pregnancy And Lactation Text: This medication likely crosses the placenta but the risk for the fetus is uncertain. This medication is excreted in breast milk.
Azathioprine Pregnancy And Lactation Text: This medication is Pregnancy Category D and isn't considered safe during pregnancy. It is unknown if this medication is excreted in breast milk.
Libtayo Counseling- I discussed with the patient the risks of Libtayo including but not limited to nausea, vomiting, diarrhea, and bone or muscle pain.  The patient verbalized understanding of the proper use and possible adverse effects of Libtayo.  All of the patient's questions and concerns were addressed.
Fluconazole Pregnancy And Lactation Text: This medication is Pregnancy Category C and it isn't know if it is safe during pregnancy. It is also excreted in breast milk.
Adbry Counseling: I discussed with the patient the risks of tralokinumab including but not limited to eye infection and irritation, cold sores, injection site reactions, worsening of asthma, allergic reactions and increased risk of parasitic infection.  Live vaccines should be avoided while taking tralokinumab. The patient understands that monitoring is required and they must alert us or the primary physician if symptoms of infection or other concerning signs are noted.
Topical Retinoid counseling:  Patient advised to apply a pea-sized amount only at bedtime and wait 30 minutes after washing their face before applying.  If too drying, patient may add a non-comedogenic moisturizer. The patient verbalized understanding of the proper use and possible adverse effects of retinoids.  All of the patient's questions and concerns were addressed.
Prednisone Counseling:  I discussed with the patient the risks of prolonged use of prednisone including but not limited to weight gain, insomnia, osteoporosis, mood changes, diabetes, susceptibility to infection, glaucoma and high blood pressure.  In cases where prednisone use is prolonged, patients should be monitored with blood pressure checks, serum glucose levels and an eye exam.  Additionally, the patient may need to be placed on GI prophylaxis, PCP prophylaxis, and calcium and vitamin D supplementation and/or a bisphosphonate.  The patient verbalized understanding of the proper use and the possible adverse effects of prednisone.  All of the patient's questions and concerns were addressed.
Protopic Pregnancy And Lactation Text: This medication is Pregnancy Category C. It is unknown if this medication is excreted in breast milk when applied topically.
Gabapentin Pregnancy And Lactation Text: This medication is Pregnancy Category C and isn't considered safe during pregnancy. It is excreted in breast milk.
Doxycycline Counseling:  Patient counseled regarding possible photosensitivity and increased risk for sunburn.  Patient instructed to avoid sunlight, if possible.  When exposed to sunlight, patients should wear protective clothing, sunglasses, and sunscreen.  The patient was instructed to call the office immediately if the following severe adverse effects occur:  hearing changes, easy bruising/bleeding, severe headache, or vision changes.  The patient verbalized understanding of the proper use and possible adverse effects of doxycycline.  All of the patient's questions and concerns were addressed.
VTAMA Counseling: I discussed with the patient that VTAMA is not for use in the eyes, mouth or mouth. They should call the office if they develop any signs of allergic reactions to VTAMA. The patient verbalized understanding of the proper use and possible adverse effects of VTAMA.  All of the patient's questions and concerns were addressed.
Doxepin Counseling:  Patient advised that the medication is sedating and not to drive a car after taking this medication. Patient informed of potential adverse effects including but not limited to dry mouth, urinary retention, and blurry vision.  The patient verbalized understanding of the proper use and possible adverse effects of doxepin.  All of the patient's questions and concerns were addressed.
Finasteride Pregnancy And Lactation Text: This medication is absolutely contraindicated during pregnancy. It is unknown if it is excreted in breast milk.
Rinvoq Pregnancy And Lactation Text: Based on animal studies, Rinvoq may cause embryo-fetal harm when administered to pregnant women.  The medication should not be used in pregnancy.  Breastfeeding is not recommended during treatment and for 6 days after the last dose.
Taltz Counseling: I discussed with the patient the risks of ixekizumab including but not limited to immunosuppression, serious infections, worsening of inflammatory bowel disease and drug reactions.  The patient understands that monitoring is required including a PPD at baseline and must alert us or the primary physician if symptoms of infection or other concerning signs are noted.
Klisyri Counseling:  I discussed with the patient the risks of Klisyri including but not limited to erythema, scaling, itching, weeping, crusting, and pain.
Arava Counseling:  Patient counseled regarding adverse effects of Arava including but not limited to nausea, vomiting, abnormalities in liver function tests. Patients may develop mouth sores, rash, diarrhea, and abnormalities in blood counts. The patient understands that monitoring is required including LFTs and blood counts.  There is a rare possibility of scarring of the liver and lung problems that can occur when taking methotrexate. Persistent nausea, loss of appetite, pale stools, dark urine, cough, and shortness of breath should be reported immediately. Patient advised to discontinue Arava treatment and consult with a physician prior to attempting conception. The patient will have to undergo a treatment to eliminate Arava from the body prior to conception.
Tranexamic Acid Counseling:  Patient advised of the small risk of bleeding problems with tranexamic acid. They were also instructed to call if they developed any nausea, vomiting or diarrhea. All of the patient's questions and concerns were addressed.
Rituxan Pregnancy And Lactation Text: This medication is Pregnancy Category C and it isn't know if it is safe during pregnancy. It is unknown if this medication is excreted in breast milk but similar antibodies are known to be excreted.
Topical Sulfur Applications Pregnancy And Lactation Text: This medication is Pregnancy Category C and has an unknown safety profile during pregnancy. It is unknown if this topical medication is excreted in breast milk.
Isotretinoin Counseling: Patient should get monthly blood tests, not donate blood, not drive at night if vision affected, not share medication, and not undergo elective surgery for 6 months after tx completed. Side effects reviewed, pt to contact office should one occur.
Otezla Pregnancy And Lactation Text: This medication is Pregnancy Category C and it isn't known if it is safe during pregnancy. It is unknown if it is excreted in breast milk.
Niacinamide Counseling: I recommended taking niacin or niacinamide, also know as vitamin B3, twice daily. Recent evidence suggests that taking vitamin B3 (500 mg twice daily) can reduce the risk of actinic keratoses and non-melanoma skin cancers. Side effects of vitamin B3 include flushing and headache.
Benzoyl Peroxide Counseling: Patient counseled that medicine may cause skin irritation and bleach clothing.  In the event of skin irritation, the patient was advised to reduce the amount of the drug applied or use it less frequently.   The patient verbalized understanding of the proper use and possible adverse effects of benzoyl peroxide.  All of the patient's questions and concerns were addressed.
Prednisone Pregnancy And Lactation Text: This medication is Pregnancy Category C and it isn't know if it is safe during pregnancy. This medication is excreted in breast milk.
Doxycycline Pregnancy And Lactation Text: This medication is Pregnancy Category D and not consider safe during pregnancy. It is also excreted in breast milk but is considered safe for shorter treatment courses.
Topical Metronidazole Pregnancy And Lactation Text: This medication is Pregnancy Category B and considered safe during pregnancy.  It is also considered safe to use while breastfeeding.
Enbrel Counseling:  I discussed with the patient the risks of etanercept including but not limited to myelosuppression, immunosuppression, autoimmune hepatitis, demyelinating diseases, lymphoma, and infections.  The patient understands that monitoring is required including a PPD at baseline and must alert us or the primary physician if symptoms of infection or other concerning signs are noted.
Griseofulvin Counseling:  I discussed with the patient the risks of griseofulvin including but not limited to photosensitivity, cytopenia, liver damage, nausea/vomiting and severe allergy.  The patient understands that this medication is best absorbed when taken with a fatty meal (e.g., ice cream or french fries).
Qbrexza Counseling:  I discussed with the patient the risks of Qbrexza including but not limited to headache, mydriasis, blurred vision, dry eyes, nasal dryness, dry mouth, dry throat, dry skin, urinary hesitation, and constipation.  Local skin reactions including erythema, burning, stinging, and itching can also occur.
Adbry Pregnancy And Lactation Text: It is unknown if this medication will adversely affect pregnancy or breast feeding.
Libtayo Pregnancy And Lactation Text: This medication is contraindicated in pregnancy and when breast feeding.
Sotyktu Counseling:  I discussed the most common side effects of Sotyktu including: common cold, sore throat, sinus infections, cold sores, canker sores, folliculitis, and acne.  I also discussed more serious side effects of Sotyktu including but not limited to: serious allergic reactions; increased risk for infections such as TB; cancers such as lymphomas; rhabdomyolysis and elevated CPK; and elevated triglycerides and liver enzymes. 
Azithromycin Counseling:  I discussed with the patient the risks of azithromycin including but not limited to GI upset, allergic reaction, drug rash, diarrhea, and yeast infections.
Cellcept Counseling:  I discussed with the patient the risks of mycophenolate mofetil including but not limited to infection/immunosuppression, GI upset, hypokalemia, hypercholesterolemia, bone marrow suppression, lymphoproliferative disorders, malignancy, GI ulceration/bleed/perforation, colitis, interstitial lung disease, kidney failure, progressive multifocal leukoencephalopathy, and birth defects.  The patient understands that monitoring is required including a baseline creatinine and regular CBC testing. In addition, patient must alert us immediately if symptoms of infection or other concerning signs are noted.
Doxepin Pregnancy And Lactation Text: This medication is Pregnancy Category C and it isn't known if it is safe during pregnancy. It is also excreted in breast milk and breast feeding isn't recommended.
Vtama Pregnancy And Lactation Text: It is unknown if this medication can cause problems during pregnancy and breastfeeding.
Birth Control Pills Counseling: Birth Control Pill Counseling: I discussed with the patient the potential side effects of OCPs including but not limited to increased risk of stroke, heart attack, thrombophlebitis, deep venous thrombosis, hepatic adenomas, breast changes, GI upset, headaches, and depression.  The patient verbalized understanding of the proper use and possible adverse effects of OCPs. All of the patient's questions and concerns were addressed.
Benzoyl Peroxide Pregnancy And Lactation Text: This medication is Pregnancy Category C. It is unknown if benzoyl peroxide is excreted in breast milk.
Glycopyrrolate Counseling:  I discussed with the patient the risks of glycopyrrolate including but not limited to skin rash, drowsiness, dry mouth, difficulty urinating, and blurred vision.
Siliq Counseling:  I discussed with the patient the risks of Siliq including but not limited to new or worsening depression, suicidal thoughts and behavior, immunosuppression, malignancy, posterior leukoencephalopathy syndrome, and serious infections.  The patient understands that monitoring is required including a PPD at baseline and must alert us or the primary physician if symptoms of infection or other concerning signs are noted. There is also a special program designed to monitor depression which is required with Siliq.
Elidel Counseling: Patient may experience a mild burning sensation during topical application. Elidel is not approved in children less than 2 years of age. There have been case reports of hematologic and skin malignancies in patients using topical calcineurin inhibitors although causality is questionable.
Taltz Pregnancy And Lactation Text: The risk during pregnancy and breastfeeding is uncertain with this medication.
Klisyri Pregnancy And Lactation Text: It is unknown if this medication can harm a developing fetus or if it is excreted in breast milk.
Isotretinoin Pregnancy And Lactation Text: This medication is Pregnancy Category X and is considered extremely dangerous during pregnancy. It is unknown if it is excreted in breast milk.
Tranexamic Acid Pregnancy And Lactation Text: It is unknown if this medication is safe during pregnancy or breast feeding.
Oxybutynin Counseling:  I discussed with the patient the risks of oxybutynin including but not limited to skin rash, drowsiness, dry mouth, difficulty urinating, and blurred vision.
Niacinamide Pregnancy And Lactation Text: These medications are considered safe during pregnancy.
Rifampin Counseling: I discussed with the patient the risks of rifampin including but not limited to liver damage, kidney damage, red-orange body fluids, nausea/vomiting and severe allergy.
Odomzo Counseling- I discussed with the patient the risks of Odomzo including but not limited to nausea, vomiting, diarrhea, constipation, weight loss, changes in the sense of taste, decreased appetite, muscle spasms, and hair loss.  The patient verbalized understanding of the proper use and possible adverse effects of Odomzo.  All of the patient's questions and concerns were addressed.
Tazorac Counseling:  Patient advised that medication is irritating and drying.  Patient may need to apply sparingly and wash off after an hour before eventually leaving it on overnight.  The patient verbalized understanding of the proper use and possible adverse effects of tazorac.  All of the patient's questions and concerns were addressed.
Erythromycin Counseling:  I discussed with the patient the risks of erythromycin including but not limited to GI upset, allergic reaction, drug rash, diarrhea, increase in liver enzymes, and yeast infections.
Bimzelx Counseling:  I discussed with the patient the risks of Bimzelx including but not limited to depression, immunosuppression, allergic reactions and infections.  The patient understands that monitoring is required including a PPD at baseline and must alert us or the primary physician if symptoms of infection or other concerning signs are noted.
Griseofulvin Pregnancy And Lactation Text: This medication is Pregnancy Category X and is known to cause serious birth defects. It is unknown if this medication is excreted in breast milk but breast feeding should be avoided.
Qbrexza Pregnancy And Lactation Text: There is no available data on Qbrexza use in pregnant women.  There is no available data on Qbrexza use in lactation.
Birth Control Pills Pregnancy And Lactation Text: This medication should be avoided if pregnant and for the first 30 days post-partum.
Azithromycin Pregnancy And Lactation Text: This medication is considered safe during pregnancy and is also secreted in breast milk.
Glycopyrrolate Pregnancy And Lactation Text: This medication is Pregnancy Category B and is considered safe during pregnancy. It is unknown if it is excreted breast milk.
High Dose Vitamin A Counseling: Side effects reviewed, pt to contact office should one occur.
Zoryve Counseling:  I discussed with the patient that Zoryve is not for use in the eyes, mouth or vagina. The most commonly reported side effects include diarrhea, headache, insomnia, application site pain, upper respiratory tract infections, and urinary tract infections.  All of the patient's questions and concerns were addressed.
Hydroxyzine Counseling: Patient advised that the medication is sedating and not to drive a car after taking this medication.  Patient informed of potential adverse effects including but not limited to dry mouth, urinary retention, and blurry vision.  The patient verbalized understanding of the proper use and possible adverse effects of hydroxyzine.  All of the patient's questions and concerns were addressed.
Clofazimine Counseling:  I discussed with the patient the risks of clofazimine including but not limited to skin and eye pigmentation, liver damage, nausea/vomiting, gastrointestinal bleeding and allergy.
Carac Counseling:  I discussed with the patient the risks of Carac including but not limited to erythema, scaling, itching, weeping, crusting, and pain.
Minoxidil Counseling: Minoxidil is a topical medication which can increase blood flow where it is applied. It is uncertain how this medication increases hair growth. Side effects are uncommon and include stinging and allergic reactions.
Sotyktu Pregnancy And Lactation Text: There is insufficient data to evaluate whether or not Sotyktu is safe to use during pregnancy.   It is not known if Sotyktu passes into breast milk and whether or not it is safe to use when breastfeeding.  
Nsaids Counseling: NSAID Counseling: I discussed with the patient that NSAIDs should be taken with food. Prolonged use of NSAIDs can result in the development of stomach ulcers.  Patient advised to stop taking NSAIDs if abdominal pain occurs.  The patient verbalized understanding of the proper use and possible adverse effects of NSAIDs.  All of the patient's questions and concerns were addressed.
Cibinqo Counseling: I discussed with the patient the risks of Cibinqo therapy including but not limited to common cold, nausea, headache, cold sores, increased blood CPK levels, dizziness, UTIs, fatigue, acne, and vomitting. Live vaccines should be avoided.  This medication has been linked to serious infections; higher rate of mortality; malignancy and lymphoproliferative disorders; major adverse cardiovascular events; thrombosis; thrombocytopenia and lymphopenia; lipid elevations; and retinal detachment.
Tremfya Counseling: I discussed with the patient the risks of guselkumab including but not limited to immunosuppression, serious infections, worsening of inflammatory bowel disease and drug reactions.  The patient understands that monitoring is required including a PPD at baseline and must alert us or the primary physician if symptoms of infection or other concerning signs are noted.
Valtrex Counseling: I discussed with the patient the risks of valacyclovir including but not limited to kidney damage, nausea, vomiting and severe allergy.  The patient understands that if the infection seems to be worsening or is not improving, they are to call.
Oxybutynin Pregnancy And Lactation Text: This medication is Pregnancy Category B and is considered safe during pregnancy. It is unknown if it is excreted in breast milk.
Humira Counseling:  I discussed with the patient the risks of adalimumab including but not limited to myelosuppression, immunosuppression, autoimmune hepatitis, demyelinating diseases, lymphoma, and serious infections.  The patient understands that monitoring is required including a PPD at baseline and must alert us or the primary physician if symptoms of infection or other concerning signs are noted.
Topical Steroids Counseling: I discussed with the patient that prolonged use of topical steroids can result in the increased appearance of superficial blood vessels (telangiectasias), lightening (hypopigmentation) and thinning of the skin (atrophy).  Patient understands to avoid using high potency steroids in skin folds, the groin or the face.  The patient verbalized understanding of the proper use and possible adverse effects of topical steroids.  All of the patient's questions and concerns were addressed.
Bactrim Counseling:  I discussed with the patient the risks of sulfa antibiotics including but not limited to GI upset, allergic reaction, drug rash, diarrhea, dizziness, photosensitivity, and yeast infections.  Rarely, more serious reactions can occur including but not limited to aplastic anemia, agranulocytosis, methemoglobinemia, blood dyscrasias, liver or kidney failure, lung infiltrates or desquamative/blistering drug rashes.
Rhofade Counseling: Rhofade is a topical medication which can decrease superficial blood flow where applied. Side effects are uncommon and include stinging, redness and allergic reactions.
Cyclophosphamide Counseling:  I discussed with the patient the risks of cyclophosphamide including but not limited to hair loss, hormonal abnormalities, decreased fertility, abdominal pain, diarrhea, nausea and vomiting, bone marrow suppression and infection. The patient understands that monitoring is required while taking this medication.
Rifampin Pregnancy And Lactation Text: This medication is Pregnancy Category C and it isn't know if it is safe during pregnancy. It is also excreted in breast milk and should not be used if you are breast feeding.
Tazorac Pregnancy And Lactation Text: This medication is not safe during pregnancy. It is unknown if this medication is excreted in breast milk.
Spironolactone Counseling: Patient advised regarding risks of diarrhea, abdominal pain, hyperkalemia, birth defects (for female patients), liver toxicity and renal toxicity. The patient may need blood work to monitor liver and kidney function and potassium levels while on therapy. The patient verbalized understanding of the proper use and possible adverse effects of spironolactone.  All of the patient's questions and concerns were addressed.
Bimzelx Pregnancy And Lactation Text: This medication crosses the placenta and the safety is uncertain during pregnancy. It is unknown if this medication is present in breast milk.
Minoxidil Pregnancy And Lactation Text: This medication has not been assigned a Pregnancy Risk Category but animal studies failed to show danger with the topical medication. It is unknown if the medication is excreted in breast milk.
Hydroxyzine Pregnancy And Lactation Text: This medication is not safe during pregnancy and should not be taken. It is also excreted in breast milk and breast feeding isn't recommended.
Erythromycin Pregnancy And Lactation Text: This medication is Pregnancy Category B and is considered safe during pregnancy. It is also excreted in breast milk.
Xeljanz Counseling: I discussed with the patient the risks of Xeljanz therapy including increased risk of infection, liver issues, headache, diarrhea, or cold symptoms. Live vaccines should be avoided. They were instructed to call if they have any problems.
Hydroxychloroquine Counseling:  I discussed with the patient that a baseline ophthalmologic exam is needed at the start of therapy and every year thereafter while on therapy. A CBC may also be warranted for monitoring.  The side effects of this medication were discussed with the patient, including but not limited to agranulocytosis, aplastic anemia, seizures, rashes, retinopathy, and liver toxicity. Patient instructed to call the office should any adverse effect occur.  The patient verbalized understanding of the proper use and possible adverse effects of Plaquenil.  All the patient's questions and concerns were addressed.
Itraconazole Counseling:  I discussed with the patient the risks of itraconazole including but not limited to liver damage, nausea/vomiting, neuropathy, and severe allergy.  The patient understands that this medication is best absorbed when taken with acidic beverages such as non-diet cola or ginger ale.  The patient understands that monitoring is required including baseline LFTs and repeat LFTs at intervals.  The patient understands that they are to contact us or the primary physician if concerning signs are noted.
Cibinqo Pregnancy And Lactation Text: It is unknown if this medication will adversely affect pregnancy or breast feeding.  You should not take this medication if you are currently pregnant or planning a pregnancy or while breastfeeding.
Wartpeel Counseling:  I discussed with the patient the risks of Wartpeel including but not limited to erythema, scaling, itching, weeping, crusting, and pain.
High Dose Vitamin A Pregnancy And Lactation Text: High dose vitamin A therapy is contraindicated during pregnancy and breast feeding.
Propranolol Counseling:  I discussed with the patient the risks of propranolol including but not limited to low heart rate, low blood pressure, low blood sugar, restlessness and increased cold sensitivity. They should call the office if they experience any of these side effects.
Eucrisa Counseling: Patient may experience a mild burning sensation during topical application. Eucrisa is not approved in children less than 2 years of age.
Sarecycline Counseling: Patient advised regarding possible photosensitivity and discoloration of the teeth, skin, lips, tongue and gums.  Patient instructed to avoid sunlight, if possible.  When exposed to sunlight, patients should wear protective clothing, sunglasses, and sunscreen.  The patient was instructed to call the office immediately if the following severe adverse effects occur:  hearing changes, easy bruising/bleeding, severe headache, or vision changes.  The patient verbalized understanding of the proper use and possible adverse effects of sarecycline.  All of the patient's questions and concerns were addressed.
Valtrex Pregnancy And Lactation Text: this medication is Pregnancy Category B and is considered safe during pregnancy. This medication is not directly found in breast milk but it's metabolite acyclovir is present.
Simponi Counseling:  I discussed with the patient the risks of golimumab including but not limited to myelosuppression, immunosuppression, autoimmune hepatitis, demyelinating diseases, lymphoma, and serious infections.  The patient understands that monitoring is required including a PPD at baseline and must alert us or the primary physician if symptoms of infection or other concerning signs are noted.
Cyclophosphamide Pregnancy And Lactation Text: This medication is Pregnancy Category D and it isn't considered safe during pregnancy. This medication is excreted in breast milk.
Nsaids Pregnancy And Lactation Text: These medications are considered safe up to 30 weeks gestation. It is excreted in breast milk.
Cimzia Counseling:  I discussed with the patient the risks of Cimzia including but not limited to immunosuppression, allergic reactions and infections.  The patient understands that monitoring is required including a PPD at baseline and must alert us or the primary physician if symptoms of infection or other concerning signs are noted.
Topical Clindamycin Counseling: Patient counseled that this medication may cause skin irritation or allergic reactions.  In the event of skin irritation, the patient was advised to reduce the amount of the drug applied or use it less frequently.   The patient verbalized understanding of the proper use and possible adverse effects of clindamycin.  All of the patient's questions and concerns were addressed.
Rhofade Pregnancy And Lactation Text: This medication has not been assigned a Pregnancy Risk Category. It is unknown if the medication is excreted in breast milk.
Bactrim Pregnancy And Lactation Text: This medication is Pregnancy Category D and is known to cause fetal risk.  It is also excreted in breast milk.
Metronidazole Counseling:  I discussed with the patient the risks of metronidazole including but not limited to seizures, nausea/vomiting, a metallic taste in the mouth, nausea/vomiting and severe allergy.
Spironolactone Pregnancy And Lactation Text: This medication can cause feminization of the male fetus and should be avoided during pregnancy. The active metabolite is also found in breast milk.
Zyclara Counseling:  I discussed with the patient the risks of imiquimod including but not limited to erythema, scaling, itching, weeping, crusting, and pain.  Patient understands that the inflammatory response to imiquimod is variable from person to person and was educated regarded proper titration schedule.  If flu-like symptoms develop, patient knows to discontinue the medication and contact us.
Xelolegz Pregnancy And Lactation Text: This medication is Pregnancy Category D and is not considered safe during pregnancy.  The risk during breast feeding is also uncertain.
Mirvaso Counseling: Mirvaso is a topical medication which can decrease superficial blood flow where applied. Side effects are uncommon and include stinging, redness and allergic reactions.
Xolair Counseling:  Patient informed of potential adverse effects including but not limited to fever, muscle aches, rash and allergic reactions.  The patient verbalized understanding of the proper use and possible adverse effects of Xolair.  All of the patient's questions and concerns were addressed.
Hydroxychloroquine Pregnancy And Lactation Text: This medication has been shown to cause fetal harm but it isn't assigned a Pregnancy Risk Category. There are small amounts excreted in breast milk.
Litfulo Counseling: I discussed with the patient the risks of Litfulo therapy including but not limited to upper respiratory tract infections, shingles, cold sores, and nausea. Live vaccines should be avoided.  This medication has been linked to serious infections; higher rate of mortality; malignancy and lymphoproliferative disorders; major adverse cardiovascular events; thrombosis; gastrointestinal perforations; neutropenia; lymphopenia; anemia; liver enzyme elevations; and lipid elevations.
Colchicine Counseling:  Patient counseled regarding adverse effects including but not limited to stomach upset (nausea, vomiting, stomach pain, or diarrhea).  Patient instructed to limit alcohol consumption while taking this medication.  Colchicine may reduce blood counts especially with prolonged use.  The patient understands that monitoring of kidney function and blood counts may be required, especially at baseline. The patient verbalized understanding of the proper use and possible adverse effects of colchicine.  All of the patient's questions and concerns were addressed.
Dutasteride Male Counseling: Dustasteride Counseling:  I discussed with the patient the risks of use of dutasteride including but not limited to decreased libido, decreased ejaculate volume, and gynecomastia. Women who can become pregnant should not handle medication.  All of the patient's questions and concerns were addressed.
Albendazole Counseling:  I discussed with the patient the risks of albendazole including but not limited to cytopenia, kidney damage, nausea/vomiting and severe allergy.  The patient understands that this medication is being used in an off-label manner.
Propranolol Pregnancy And Lactation Text: This medication is Pregnancy Category C and it isn't known if it is safe during pregnancy. It is excreted in breast milk.
Olanzapine Counseling- I discussed with the patient the common side effects of olanzapine including but are not limited to: lack of energy, dry mouth, increased appetite, sleepiness, tremor, constipation, dizziness, changes in behavior, or restlessness.  Explained that teenagers are more likely to experience headaches, abdominal pain, pain in the arms or legs, tiredness, and sleepiness.  Serious side effects include but are not limited: increased risk of death in elderly patients who are confused, have memory loss, or dementia-related psychosis; hyperglycemia; increased cholesterol and triglycerides; and weight gain.
Calcipotriene Counseling:  I discussed with the patient the risks of calcipotriene including but not limited to erythema, scaling, itching, and irritation.
Topical Steroids Applications Pregnancy And Lactation Text: Most topical steroids are considered safe to use during pregnancy and lactation.  Any topical steroid applied to the breast or nipple should be washed off before breastfeeding.
Ilumya Counseling: I discussed with the patient the risks of tildrakizumab including but not limited to immunosuppression, malignancy, posterior leukoencephalopathy syndrome, and serious infections.  The patient understands that monitoring is required including a PPD at baseline and must alert us or the primary physician if symptoms of infection or other concerning signs are noted.
Use Enhanced Medication Counseling?: No
Solaraze Counseling:  I discussed with the patient the risks of Solaraze including but not limited to erythema, scaling, itching, weeping, crusting, and pain.
Cimzia Pregnancy And Lactation Text: This medication crosses the placenta but can be considered safe in certain situations. Cimzia may be excreted in breast milk.
Ketoconazole Counseling:   Patient counseled regarding improving absorption with orange juice.  Adverse effects include but are not limited to breast enlargement, headache, diarrhea, nausea, upset stomach, liver function test abnormalities, taste disturbance, and stomach pain.  There is a rare possibility of liver failure that can occur when taking ketoconazole. The patient understands that monitoring of LFTs may be required, especially at baseline. The patient verbalized understanding of the proper use and possible adverse effects of ketoconazole.  All of the patient's questions and concerns were addressed.
Cyclosporine Counseling:  I discussed with the patient the risks of cyclosporine including but not limited to hypertension, gingival hyperplasia,myelosuppression, immunosuppression, liver damage, kidney damage, neurotoxicity, lymphoma, and serious infections. The patient understands that monitoring is required including baseline blood pressure, CBC, CMP, lipid panel and uric acid, and then 1-2 times monthly CMP and blood pressure.
Calcipotriene Pregnancy And Lactation Text: The use of this medication during pregnancy or lactation is not recommended as there is insufficient data.
Cephalexin Counseling: I counseled the patient regarding use of cephalexin as an antibiotic for prophylactic and/or therapeutic purposes. Cephalexin (commonly prescribed under brand name Keflex) is a cephalosporin antibiotic which is active against numerous classes of bacteria, including most skin bacteria. Side effects may include nausea, diarrhea, gastrointestinal upset, rash, hives, yeast infections, and in rare cases, hepatitis, kidney disease, seizures, fever, confusion, neurologic symptoms, and others. Patients with severe allergies to penicillin medications are cautioned that there is about a 10% incidence of cross-reactivity with cephalosporins. When possible, patients with penicillin allergies should use alternatives to cephalosporins for antibiotic therapy.
Aklief counseling:  Patient advised to apply a pea-sized amount only at bedtime and wait 30 minutes after washing their face before applying.  If too drying, patient may add a non-comedogenic moisturizer.  The most commonly reported side effects including irritation, redness, scaling, dryness, stinging, burning, itching, and increased risk of sunburn.  The patient verbalized understanding of the proper use and possible adverse effects of retinoids.  All of the patient's questions and concerns were addressed.
Cantharidin Counseling:  I discussed with the patient the risks of Cantharidin including but not limited to pain, redness, burning, itching, and blistering.
Dutasteride Female Counseling: Dutasteride Counseling:  I discussed with the patient the risks of use of dutasteride including but not limited to decreased libido and sexual dysfunction. Explained the teratogenic nature of the medication and stressed the importance of not getting pregnant during treatment. All of the patient's questions and concerns were addressed.
Litfulo Pregnancy And Lactation Text: Based on animal studies, Lifulo may cause embryo-fetal harm when administered to pregnant women.  The medication should not be used in pregnancy.  Breastfeeding is not recommended during treatment.
Xolair Pregnancy And Lactation Text: This medication is Pregnancy Category B and is considered safe during pregnancy. This medication is excreted in breast milk.
Low Dose Naltrexone Counseling- I discussed with the patient the potential risks and side effects of low dose naltrexone including but not limited to: more vivid dreams, headaches, nausea, vomiting, abdominal pain, fatigue, dizziness, and anxiety.
Skyrizi Counseling: I discussed with the patient the risks of risankizumab-rzaa including but not limited to immunosuppression, and serious infections.  The patient understands that monitoring is required including a PPD at baseline and must alert us or the primary physician if symptoms of infection or other concerning signs are noted.
Hydroquinone Counseling:  Patient advised that medication may result in skin irritation, lightening (hypopigmentation), dryness, and burning.  In the event of skin irritation, the patient was advised to reduce the amount of the drug applied or use it less frequently.  Rarely, spots that are treated with hydroquinone can become darker (pseudoochronosis).  Should this occur, patient instructed to stop medication and call the office. The patient verbalized understanding of the proper use and possible adverse effects of hydroquinone.  All of the patient's questions and concerns were addressed.
SSKI Counseling:  I discussed with the patient the risks of SSKI including but not limited to thyroid abnormalities, metallic taste, GI upset, fever, headache, acne, arthralgias, paraesthesias, lymphadenopathy, easy bleeding, arrhythmias, and allergic reaction.
Olanzapine Pregnancy And Lactation Text: This medication is pregnancy category C.   There are no adequate and well controlled trials with olanzapine in pregnant females.  Olanzapine should be used during pregnancy only if the potential benefit justifies the potential risk to the fetus.   In a study in lactating healthy women, olanzapine was excreted in breast milk.  It is recommended that women taking olanzapine should not breast feed.
Metronidazole Pregnancy And Lactation Text: This medication is Pregnancy Category B and considered safe during pregnancy.  It is also excreted in breast milk.
Acitretin Counseling:  I discussed with the patient the risks of acitretin including but not limited to hair loss, dry lips/skin/eyes, liver damage, hyperlipidemia, depression/suicidal ideation, photosensitivity.  Serious rare side effects can include but are not limited to pancreatitis, pseudotumor cerebri, bony changes, clot formation/stroke/heart attack.  Patient understands that alcohol is contraindicated since it can result in liver toxicity and significantly prolong the elimination of the drug by many years.
Tetracycline Counseling: Patient counseled regarding possible photosensitivity and increased risk for sunburn.  Patient instructed to avoid sunlight, if possible.  When exposed to sunlight, patients should wear protective clothing, sunglasses, and sunscreen.  The patient was instructed to call the office immediately if the following severe adverse effects occur:  hearing changes, easy bruising/bleeding, severe headache, or vision changes.  The patient verbalized understanding of the proper use and possible adverse effects of tetracycline.  All of the patient's questions and concerns were addressed. Patient understands to avoid pregnancy while on therapy due to potential birth defects.
Detail Level: Zone
Cephalexin Pregnancy And Lactation Text: This medication is Pregnancy Category B and considered safe during pregnancy.  It is also excreted in breast milk but can be used safely for shorter doses.
Topical Ketoconazole Counseling: Patient counseled that this medication may cause skin irritation or allergic reactions.  In the event of skin irritation, the patient was advised to reduce the amount of the drug applied or use it less frequently.   The patient verbalized understanding of the proper use and possible adverse effects of ketoconazole.  All of the patient's questions and concerns were addressed.
Cosentyx Counseling:  I discussed with the patient the risks of Cosentyx including but not limited to worsening of Crohn's disease, immunosuppression, allergic reactions and infections.  The patient understands that monitoring is required including a PPD at baseline and must alert us or the primary physician if symptoms of infection or other concerning signs are noted.
Solaraze Pregnancy And Lactation Text: This medication is Pregnancy Category B and is considered safe. There is some data to suggest avoiding during the third trimester. It is unknown if this medication is excreted in breast milk.
Picato Counseling:  I discussed with the patient the risks of Picato including but not limited to erythema, scaling, itching, weeping, crusting, and pain.
Cantharidin Pregnancy And Lactation Text: This medication has not been proven safe during pregnancy. It is unknown if this medication is excreted in breast milk.
Opzelura Counseling:  I discussed with the patient the risks of Opzelura including but not limited to nasopharngitis, bronchitis, ear infection, eosinophila, hives, diarrhea, folliculitis, tonsillitis, and rhinorrhea.  Taken orally, this medication has been linked to serious infections; higher rate of mortality; malignancy and lymphoproliferative disorders; major adverse cardiovascular events; thrombosis; thrombocytopenia, anemia, and neutropenia; and lipid elevations.
Dapsone Counseling: I discussed with the patient the risks of dapsone including but not limited to hemolytic anemia, agranulocytosis, rashes, methemoglobinemia, kidney failure, peripheral neuropathy, headaches, GI upset, and liver toxicity.  Patients who start dapsone require monitoring including baseline LFTs and weekly CBCs for the first month, then every month thereafter.  The patient verbalized understanding of the proper use and possible adverse effects of dapsone.  All of the patient's questions and concerns were addressed.
5-Fu Counseling: 5-Fluorouracil Counseling:  I discussed with the patient the risks of 5-fluorouracil including but not limited to erythema, scaling, itching, weeping, crusting, and pain.
Ketoconazole Pregnancy And Lactation Text: This medication is Pregnancy Category C and it isn't know if it is safe during pregnancy. It is also excreted in breast milk and breast feeding isn't recommended.
Dutasteride Pregnancy And Lactation Text: This medication is absolutely contraindicated in women, especially during pregnancy and breast feeding. Feminization of male fetuses is possible if taking while pregnant.
Oral Minoxidil Counseling- I discussed with the patient the risks of oral minoxidil including but not limited to shortness of breath, swelling of the feet or ankles, dizziness, lightheadedness, unwanted hair growth and allergic reaction.  The patient verbalized understanding of the proper use and possible adverse effects of oral minoxidil.  All of the patient's questions and concerns were addressed.
Acitretin Pregnancy And Lactation Text: This medication is Pregnancy Category X and should not be given to women who are pregnant or may become pregnant in the future. This medication is excreted in breast milk.
Aklief Pregnancy And Lactation Text: It is unknown if this medication is safe to use during pregnancy.  It is unknown if this medication is excreted in breast milk.  Breastfeeding women should use the topical cream on the smallest area of the skin for the shortest time needed while breastfeeding.  Do not apply to nipple and areola.
Olumiant Counseling: I discussed with the patient the risks of Olumiant therapy including but not limited to upper respiratory tract infections, shingles, cold sores, and nausea. Live vaccines should be avoided.  This medication has been linked to serious infections; higher rate of mortality; malignancy and lymphoproliferative disorders; major adverse cardiovascular events; thrombosis; gastrointestinal perforations; neutropenia; lymphopenia; anemia; liver enzyme elevations; and lipid elevations.
Sski Pregnancy And Lactation Text: This medication is Pregnancy Category D and isn't considered safe during pregnancy. It is excreted in breast milk.
Infliximab Counseling:  I discussed with the patient the risks of infliximab including but not limited to myelosuppression, immunosuppression, autoimmune hepatitis, demyelinating diseases, lymphoma, and serious infections.  The patient understands that monitoring is required including a PPD at baseline and must alert us or the primary physician if symptoms of infection or other concerning signs are noted.
Ivermectin Counseling:  Patient instructed to take medication on an empty stomach with a full glass of water.  Patient informed of potential adverse effects including but not limited to nausea, diarrhea, dizziness, itching, and swelling of the extremities or lymph nodes.  The patient verbalized understanding of the proper use and possible adverse effects of ivermectin.  All of the patient's questions and concerns were addressed.
Clindamycin Counseling: I counseled the patient regarding use of clindamycin as an antibiotic for prophylactic and/or therapeutic purposes. Clindamycin is active against numerous classes of bacteria, including skin bacteria. Side effects may include nausea, diarrhea, gastrointestinal upset, rash, hives, yeast infections, and in rare cases, colitis.
Minocycline Counseling: Patient advised regarding possible photosensitivity and discoloration of the teeth, skin, lips, tongue and gums.  Patient instructed to avoid sunlight, if possible.  When exposed to sunlight, patients should wear protective clothing, sunglasses, and sunscreen.  The patient was instructed to call the office immediately if the following severe adverse effects occur:  hearing changes, easy bruising/bleeding, severe headache, or vision changes.  The patient verbalized understanding of the proper use and possible adverse effects of minocycline.  All of the patient's questions and concerns were addressed.
Erivedge Counseling- I discussed with the patient the risks of Erivedge including but not limited to nausea, vomiting, diarrhea, constipation, weight loss, changes in the sense of taste, decreased appetite, muscle spasms, and hair loss.  The patient verbalized understanding of the proper use and possible adverse effects of Erivedge.  All of the patient's questions and concerns were addressed.
Opioid Counseling: I discussed with the patient the potential side effects of opioids including but not limited to addiction, altered mental status, and depression. I stressed avoiding alcohol, benzodiazepines, muscle relaxants and sleep aids unless specifically okayed by a physician. The patient verbalized understanding of the proper use and possible adverse effects of opioids. All of the patient's questions and concerns were addressed. They were instructed to flush the remaining pills down the toilet if they did not need them for pain.
Methotrexate Counseling:  Patient counseled regarding adverse effects of methotrexate including but not limited to nausea, vomiting, abnormalities in liver function tests. Patients may develop mouth sores, rash, diarrhea, and abnormalities in blood counts. The patient understands that monitoring is required including LFT's and blood counts.  There is a rare possibility of scarring of the liver and lung problems that can occur when taking methotrexate. Persistent nausea, loss of appetite, pale stools, dark urine, cough, and shortness of breath should be reported immediately. Patient advised to discontinue methotrexate treatment at least three months before attempting to become pregnant.  I discussed the need for folate supplements while taking methotrexate.  These supplements can decrease side effects during methotrexate treatment. The patient verbalized understanding of the proper use and possible adverse effects of methotrexate.  All of the patient's questions and concerns were addressed.
Soolantra Counseling: I discussed with the patients the risks of topial Soolantra. This is a medicine which decreases the number of mites and inflammation in the skin. You experience burning, stinging, eye irritation or allergic reactions.  Please call our office if you develop any problems from using this medication.
Opzelura Pregnancy And Lactation Text: There is insufficient data to evaluate drug-associated risk for major birth defects, miscarriage, or other adverse maternal or fetal outcomes.  There is a pregnancy registry that monitors pregnancy outcomes in pregnant persons exposed to the medication during pregnancy.  It is unknown if this medication is excreted in breast milk.  Do not breastfeed during treatment and for about 4 weeks after the last dose.
Topical Sulfur Applications Counseling: Topical Sulfur Counseling: Patient counseled that this medication may cause skin irritation or allergic reactions.  In the event of skin irritation, the patient was advised to reduce the amount of the drug applied or use it less frequently.   The patient verbalized understanding of the proper use and possible adverse effects of topical sulfur application.  All of the patient's questions and concerns were addressed.
Finasteride Male Counseling: Finasteride Counseling:  I discussed with the patient the risks of use of finasteride including but not limited to decreased libido, decreased ejaculate volume, gynecomastia, and depression. Women should not handle medication.  All of the patient's questions and concerns were addressed.
Terbinafine Counseling: Patient counseling regarding adverse effects of terbinafine including but not limited to headache, diarrhea, rash, upset stomach, liver function test abnormalities, itching, taste/smell disturbance, nausea, abdominal pain, and flatulence.  There is a rare possibility of liver failure that can occur when taking terbinafine.  The patient understands that a baseline LFT and kidney function test may be required. The patient verbalized understanding of the proper use and possible adverse effects of terbinafine.  All of the patient's questions and concerns were addressed.
Dapsone Pregnancy And Lactation Text: This medication is Pregnancy Category C and is not considered safe during pregnancy or breast feeding.
Cimetidine Counseling:  I discussed with the patient the risks of Cimetidine including but not limited to gynecomastia, headache, diarrhea, nausea, drowsiness, arrhythmias, pancreatitis, skin rashes, psychosis, bone marrow suppression and kidney toxicity.
Winlevi Counseling:  I discussed with the patient the risks of topical clascoterone including but not limited to erythema, scaling, itching, and stinging. Patient voiced their understanding.
Azelaic Acid Counseling: Patient counseled that medicine may cause skin irritation and to avoid applying near the eyes.  In the event of skin irritation, the patient was advised to reduce the amount of the drug applied or use it less frequently.   The patient verbalized understanding of the proper use and possible adverse effects of azelaic acid.  All of the patient's questions and concerns were addressed.
Imiquimod Counseling:  I discussed with the patient the risks of imiquimod including but not limited to erythema, scaling, itching, weeping, crusting, and pain.  Patient understands that the inflammatory response to imiquimod is variable from person to person and was educated regarded proper titration schedule.  If flu-like symptoms develop, patient knows to discontinue the medication and contact us.
Olumiant Pregnancy And Lactation Text: Based on animal studies, Olumiant may cause embryo-fetal harm when administered to pregnant women.  The medication should not be used in pregnancy.  Breastfeeding is not recommended during treatment.

## 2024-02-27 ENCOUNTER — OFFICE VISIT (OUTPATIENT)
Dept: VASCULAR SURGERY | Age: 74
End: 2024-02-27
Payer: MEDICARE

## 2024-02-27 VITALS
WEIGHT: 166.5 LBS | BODY MASS INDEX: 23.84 KG/M2 | HEART RATE: 100 BPM | HEIGHT: 70 IN | SYSTOLIC BLOOD PRESSURE: 166 MMHG | OXYGEN SATURATION: 93 % | DIASTOLIC BLOOD PRESSURE: 92 MMHG

## 2024-02-27 DIAGNOSIS — I73.9 PVD (PERIPHERAL VASCULAR DISEASE) WITH CLAUDICATION (HCC): Primary | ICD-10-CM

## 2024-02-27 PROCEDURE — 99213 OFFICE O/P EST LOW 20 MIN: CPT | Performed by: SURGERY

## 2024-02-27 PROCEDURE — 1036F TOBACCO NON-USER: CPT | Performed by: SURGERY

## 2024-02-27 PROCEDURE — 1123F ACP DISCUSS/DSCN MKR DOCD: CPT | Performed by: SURGERY

## 2024-02-27 PROCEDURE — G8427 DOCREV CUR MEDS BY ELIG CLIN: HCPCS | Performed by: SURGERY

## 2024-02-27 PROCEDURE — G8420 CALC BMI NORM PARAMETERS: HCPCS | Performed by: SURGERY

## 2024-02-27 PROCEDURE — 3017F COLORECTAL CA SCREEN DOC REV: CPT | Performed by: SURGERY

## 2024-02-27 PROCEDURE — G8484 FLU IMMUNIZE NO ADMIN: HCPCS | Performed by: SURGERY

## 2024-02-27 PROCEDURE — 3077F SYST BP >= 140 MM HG: CPT | Performed by: SURGERY

## 2024-02-27 PROCEDURE — 3078F DIAST BP <80 MM HG: CPT | Performed by: SURGERY

## 2024-02-27 NOTE — PROGRESS NOTES
dictated using speech recognition software. As a result, errors of speech recognition may have occurred.    20 minutes of time was spent on this encounter including chart review, assessment, evaluation and coordination of patient care

## 2024-05-01 ENCOUNTER — APPOINTMENT (RX ONLY)
Dept: URBAN - NONMETROPOLITAN AREA CLINIC 1 | Facility: CLINIC | Age: 74
Setting detail: DERMATOLOGY
End: 2024-05-01

## 2024-05-01 DIAGNOSIS — L57.0 ACTINIC KERATOSIS: ICD-10-CM | Status: IMPROVED

## 2024-05-01 PROCEDURE — ? MDM - TREATMENT GOALS

## 2024-05-01 PROCEDURE — ? PHOTO-DOCUMENTATION

## 2024-05-01 PROCEDURE — ? PRESCRIPTION MEDICATION MANAGEMENT

## 2024-05-01 PROCEDURE — ? COUNSELING

## 2024-05-01 PROCEDURE — ? SUNSCREEN RECOMMENDATIONS

## 2024-05-01 PROCEDURE — 99213 OFFICE O/P EST LOW 20 MIN: CPT

## 2024-05-01 PROCEDURE — ? MEDICATION COUNSELING

## 2024-05-01 ASSESSMENT — LOCATION SIMPLE DESCRIPTION DERM
LOCATION SIMPLE: NOSE
LOCATION SIMPLE: LEFT CHEEK
LOCATION SIMPLE: RIGHT CHEEK
LOCATION SIMPLE: LEFT FOREHEAD

## 2024-05-01 ASSESSMENT — LOCATION DETAILED DESCRIPTION DERM
LOCATION DETAILED: NASAL DORSUM
LOCATION DETAILED: LEFT MEDIAL FOREHEAD
LOCATION DETAILED: RIGHT CENTRAL MALAR CHEEK
LOCATION DETAILED: LEFT LATERAL MALAR CHEEK

## 2024-05-01 ASSESSMENT — LOCATION ZONE DERM
LOCATION ZONE: NOSE
LOCATION ZONE: FACE

## 2024-05-01 NOTE — PROCEDURE: MEDICATION COUNSELING
Carac Pregnancy And Lactation Text: This medication is Pregnancy Category X and contraindicated in pregnancy and in women who may become pregnant. It is unknown if this medication is excreted in breast milk.
Xolair Counseling:  Patient informed of potential adverse effects including but not limited to fever, muscle aches, rash and allergic reactions.  The patient verbalized understanding of the proper use and possible adverse effects of Xolair.  All of the patient's questions and concerns were addressed.
Picato Counseling:  I discussed with the patient the risks of Picato including but not limited to erythema, scaling, itching, weeping, crusting, and pain.
Azithromycin Pregnancy And Lactation Text: This medication is considered safe during pregnancy and is also secreted in breast milk.
Doxycycline Pregnancy And Lactation Text: This medication is Pregnancy Category D and not consider safe during pregnancy. It is also excreted in breast milk but is considered safe for shorter treatment courses.
Topical Sulfur Applications Pregnancy And Lactation Text: This medication is Pregnancy Category C and has an unknown safety profile during pregnancy. It is unknown if this topical medication is excreted in breast milk.
Terbinafine Counseling: Patient counseling regarding adverse effects of terbinafine including but not limited to headache, diarrhea, rash, upset stomach, liver function test abnormalities, itching, taste/smell disturbance, nausea, abdominal pain, and flatulence.  There is a rare possibility of liver failure that can occur when taking terbinafine.  The patient understands that a baseline LFT and kidney function test may be required. The patient verbalized understanding of the proper use and possible adverse effects of terbinafine.  All of the patient's questions and concerns were addressed.
Oral Minoxidil Counseling- I discussed with the patient the risks of oral minoxidil including but not limited to shortness of breath, swelling of the feet or ankles, dizziness, lightheadedness, unwanted hair growth and allergic reaction.  The patient verbalized understanding of the proper use and possible adverse effects of oral minoxidil.  All of the patient's questions and concerns were addressed.
Clofazimine Pregnancy And Lactation Text: This medication is Pregnancy Category C and isn't considered safe during pregnancy. It is excreted in breast milk.
Methotrexate Counseling:  Patient counseled regarding adverse effects of methotrexate including but not limited to nausea, vomiting, abnormalities in liver function tests. Patients may develop mouth sores, rash, diarrhea, and abnormalities in blood counts. The patient understands that monitoring is required including LFT's and blood counts.  There is a rare possibility of scarring of the liver and lung problems that can occur when taking methotrexate. Persistent nausea, loss of appetite, pale stools, dark urine, cough, and shortness of breath should be reported immediately. Patient advised to discontinue methotrexate treatment at least three months before attempting to become pregnant.  I discussed the need for folate supplements while taking methotrexate.  These supplements can decrease side effects during methotrexate treatment. The patient verbalized understanding of the proper use and possible adverse effects of methotrexate.  All of the patient's questions and concerns were addressed.
Rifampin Counseling: I discussed with the patient the risks of rifampin including but not limited to liver damage, kidney damage, red-orange body fluids, nausea/vomiting and severe allergy.
Thalidomide Pregnancy And Lactation Text: This medication is Pregnancy Category X and is absolutely contraindicated during pregnancy. It is unknown if it is excreted in breast milk.
Fluconazole Counseling:  Patient counseled regarding adverse effects of fluconazole including but not limited to headache, diarrhea, nausea, upset stomach, liver function test abnormalities, taste disturbance, and stomach pain.  There is a rare possibility of liver failure that can occur when taking fluconazole.  The patient understands that monitoring of LFTs and kidney function test may be required, especially at baseline. The patient verbalized understanding of the proper use and possible adverse effects of fluconazole.  All of the patient's questions and concerns were addressed.
Zyclara Counseling:  I discussed with the patient the risks of imiquimod including but not limited to erythema, scaling, itching, weeping, crusting, and pain.  Patient understands that the inflammatory response to imiquimod is variable from person to person and was educated regarded proper titration schedule.  If flu-like symptoms develop, patient knows to discontinue the medication and contact us.
Low Dose Naltrexone Counseling- I discussed with the patient the potential risks and side effects of low dose naltrexone including but not limited to: more vivid dreams, headaches, nausea, vomiting, abdominal pain, fatigue, dizziness, and anxiety.
Albendazole Counseling:  I discussed with the patient the risks of albendazole including but not limited to cytopenia, kidney damage, nausea/vomiting and severe allergy.  The patient understands that this medication is being used in an off-label manner.
Olumiant Counseling: I discussed with the patient the risks of Olumiant therapy including but not limited to upper respiratory tract infections, shingles, cold sores, and nausea. Live vaccines should be avoided.  This medication has been linked to serious infections; higher rate of mortality; malignancy and lymphoproliferative disorders; major adverse cardiovascular events; thrombosis; gastrointestinal perforations; neutropenia; lymphopenia; anemia; liver enzyme elevations; and lipid elevations.
Bimzelx Pregnancy And Lactation Text: This medication crosses the placenta and the safety is uncertain during pregnancy. It is unknown if this medication is present in breast milk.
Acitretin Counseling:  I discussed with the patient the risks of acitretin including but not limited to hair loss, dry lips/skin/eyes, liver damage, hyperlipidemia, depression/suicidal ideation, photosensitivity.  Serious rare side effects can include but are not limited to pancreatitis, pseudotumor cerebri, bony changes, clot formation/stroke/heart attack.  Patient understands that alcohol is contraindicated since it can result in liver toxicity and significantly prolong the elimination of the drug by many years.
Topical Clindamycin Pregnancy And Lactation Text: This medication is Pregnancy Category B and is considered safe during pregnancy. It is unknown if it is excreted in breast milk.
Ilumya Pregnancy And Lactation Text: The risk during pregnancy and breastfeeding is uncertain with this medication.
Klisyri Counseling:  I discussed with the patient the risks of Klisyri including but not limited to erythema, scaling, itching, weeping, crusting, and pain.
Terbinafine Pregnancy And Lactation Text: This medication is Pregnancy Category B and is considered safe during pregnancy. It is also excreted in breast milk and breast feeding isn't recommended.
Elidel Counseling: Patient may experience a mild burning sensation during topical application. Elidel is not approved in children less than 2 years of age. There have been case reports of hematologic and skin malignancies in patients using topical calcineurin inhibitors although causality is questionable.
Calcipotriene Counseling:  I discussed with the patient the risks of calcipotriene including but not limited to erythema, scaling, itching, and irritation.
Azathioprine Counseling:  I discussed with the patient the risks of azathioprine including but not limited to myelosuppression, immunosuppression, hepatotoxicity, lymphoma, and infections.  The patient understands that monitoring is required including baseline LFTs, Creatinine, possible TPMP genotyping and weekly CBCs for the first month and then every 2 weeks thereafter.  The patient verbalized understanding of the proper use and possible adverse effects of azathioprine.  All of the patient's questions and concerns were addressed.
Dutasteride Pregnancy And Lactation Text: This medication is absolutely contraindicated in women, especially during pregnancy and breast feeding. Feminization of male fetuses is possible if taking while pregnant.
Solaraze Pregnancy And Lactation Text: This medication is Pregnancy Category B and is considered safe. There is some data to suggest avoiding during the third trimester. It is unknown if this medication is excreted in breast milk.
Picato Pregnancy And Lactation Text: This medication is Pregnancy Category C. It is unknown if this medication is excreted in breast milk.
Colchicine Counseling:  Patient counseled regarding adverse effects including but not limited to stomach upset (nausea, vomiting, stomach pain, or diarrhea).  Patient instructed to limit alcohol consumption while taking this medication.  Colchicine may reduce blood counts especially with prolonged use.  The patient understands that monitoring of kidney function and blood counts may be required, especially at baseline. The patient verbalized understanding of the proper use and possible adverse effects of colchicine.  All of the patient's questions and concerns were addressed.
Wartpeel Counseling:  I discussed with the patient the risks of Wartpeel including but not limited to erythema, scaling, itching, weeping, crusting, and pain.
Erythromycin Counseling:  I discussed with the patient the risks of erythromycin including but not limited to GI upset, allergic reaction, drug rash, diarrhea, increase in liver enzymes, and yeast infections.
Skyrizi Counseling: I discussed with the patient the risks of risankizumab-rzaa including but not limited to immunosuppression, and serious infections.  The patient understands that monitoring is required including a PPD at baseline and must alert us or the primary physician if symptoms of infection or other concerning signs are noted.
Oral Minoxidil Pregnancy And Lactation Text: This medication should only be used when clearly needed if you are pregnant, attempting to become pregnant or breast feeding.
Klisyri Pregnancy And Lactation Text: It is unknown if this medication can harm a developing fetus or if it is excreted in breast milk.
Infliximab Counseling:  I discussed with the patient the risks of infliximab including but not limited to myelosuppression, immunosuppression, autoimmune hepatitis, demyelinating diseases, lymphoma, and serious infections.  The patient understands that monitoring is required including a PPD at baseline and must alert us or the primary physician if symptoms of infection or other concerning signs are noted.
Methotrexate Pregnancy And Lactation Text: This medication is Pregnancy Category X and is known to cause fetal harm. This medication is excreted in breast milk.
Rifampin Pregnancy And Lactation Text: This medication is Pregnancy Category C and it isn't know if it is safe during pregnancy. It is also excreted in breast milk and should not be used if you are breast feeding.
Topical Ketoconazole Counseling: Patient counseled that this medication may cause skin irritation or allergic reactions.  In the event of skin irritation, the patient was advised to reduce the amount of the drug applied or use it less frequently.   The patient verbalized understanding of the proper use and possible adverse effects of ketoconazole.  All of the patient's questions and concerns were addressed.
Bactrim Counseling:  I discussed with the patient the risks of sulfa antibiotics including but not limited to GI upset, allergic reaction, drug rash, diarrhea, dizziness, photosensitivity, and yeast infections.  Rarely, more serious reactions can occur including but not limited to aplastic anemia, agranulocytosis, methemoglobinemia, blood dyscrasias, liver or kidney failure, lung infiltrates or desquamative/blistering drug rashes.
Tranexamic Acid Counseling:  Patient advised of the small risk of bleeding problems with tranexamic acid. They were also instructed to call if they developed any nausea, vomiting or diarrhea. All of the patient's questions and concerns were addressed.
Xolair Pregnancy And Lactation Text: This medication is Pregnancy Category B and is considered safe during pregnancy. This medication is excreted in breast milk.
Albendazole Pregnancy And Lactation Text: This medication is Pregnancy Category C and it isn't known if it is safe during pregnancy. It is also excreted in breast milk.
Cimzia Counseling:  I discussed with the patient the risks of Cimzia including but not limited to immunosuppression, allergic reactions and infections.  The patient understands that monitoring is required including a PPD at baseline and must alert us or the primary physician if symptoms of infection or other concerning signs are noted.
Aklief counseling:  Patient advised to apply a pea-sized amount only at bedtime and wait 30 minutes after washing their face before applying.  If too drying, patient may add a non-comedogenic moisturizer.  The most commonly reported side effects including irritation, redness, scaling, dryness, stinging, burning, itching, and increased risk of sunburn.  The patient verbalized understanding of the proper use and possible adverse effects of retinoids.  All of the patient's questions and concerns were addressed.
Low Dose Naltrexone Pregnancy And Lactation Text: Naltrexone is pregnancy category C.  There have been no adequate and well-controlled studies in pregnant women.  It should be used in pregnancy only if the potential benefit justifies the potential risk to the fetus.   Limited data indicates that naltrexone is minimally excreted into breastmilk.
Fluconazole Pregnancy And Lactation Text: This medication is Pregnancy Category C and it isn't know if it is safe during pregnancy. It is also excreted in breast milk.
Azathioprine Pregnancy And Lactation Text: This medication is Pregnancy Category D and isn't considered safe during pregnancy. It is unknown if this medication is excreted in breast milk.
Finasteride Male Counseling: Finasteride Counseling:  I discussed with the patient the risks of use of finasteride including but not limited to decreased libido, decreased ejaculate volume, gynecomastia, and depression. Women should not handle medication.  All of the patient's questions and concerns were addressed.
Olumiant Pregnancy And Lactation Text: Based on animal studies, Olumiant may cause embryo-fetal harm when administered to pregnant women.  The medication should not be used in pregnancy.  Breastfeeding is not recommended during treatment.
Acitretin Pregnancy And Lactation Text: This medication is Pregnancy Category X and should not be given to women who are pregnant or may become pregnant in the future. This medication is excreted in breast milk.
Calcipotriene Pregnancy And Lactation Text: The use of this medication during pregnancy or lactation is not recommended as there is insufficient data.
Cantharidin Counseling:  I discussed with the patient the risks of Cantharidin including but not limited to pain, redness, burning, itching, and blistering.
Otezla Counseling: The side effects of Otezla were discussed with the patient, including but not limited to worsening or new depression, weight loss, diarrhea, nausea, upper respiratory tract infection, and headache. Patient instructed to call the office should any adverse effect occur.  The patient verbalized understanding of the proper use and possible adverse effects of Otezla.  All the patient's questions and concerns were addressed.
Soolantra Counseling: I discussed with the patients the risks of topial Soolantra. This is a medicine which decreases the number of mites and inflammation in the skin. You experience burning, stinging, eye irritation or allergic reactions.  Please call our office if you develop any problems from using this medication.
Aklief Pregnancy And Lactation Text: It is unknown if this medication is safe to use during pregnancy.  It is unknown if this medication is excreted in breast milk.  Breastfeeding women should use the topical cream on the smallest area of the skin for the shortest time needed while breastfeeding.  Do not apply to nipple and areola.
Protopic Counseling: Patient may experience a mild burning sensation during topical application. Protopic is not approved in children less than 2 years of age. There have been case reports of hematologic and skin malignancies in patients using topical calcineurin inhibitors although causality is questionable.
Erythromycin Pregnancy And Lactation Text: This medication is Pregnancy Category B and is considered safe during pregnancy. It is also excreted in breast milk.
Prednisone Counseling:  I discussed with the patient the risks of prolonged use of prednisone including but not limited to weight gain, insomnia, osteoporosis, mood changes, diabetes, susceptibility to infection, glaucoma and high blood pressure.  In cases where prednisone use is prolonged, patients should be monitored with blood pressure checks, serum glucose levels and an eye exam.  Additionally, the patient may need to be placed on GI prophylaxis, PCP prophylaxis, and calcium and vitamin D supplementation and/or a bisphosphonate.  The patient verbalized understanding of the proper use and the possible adverse effects of prednisone.  All of the patient's questions and concerns were addressed.
Rinvoq Counseling: I discussed with the patient the risks of Rinvoq therapy including but not limited to upper respiratory tract infections, shingles, cold sores, bronchitis, nausea, cough, fever, acne, and headache. Live vaccines should be avoided.  This medication has been linked to serious infections; higher rate of mortality; malignancy and lymphoproliferative disorders; major adverse cardiovascular events; thrombosis; thrombocytopenia, anemia, and neutropenia; lipid elevations; liver enzyme elevations; and gastrointestinal perforations.
Ivermectin Counseling:  Patient instructed to take medication on an empty stomach with a full glass of water.  Patient informed of potential adverse effects including but not limited to nausea, diarrhea, dizziness, itching, and swelling of the extremities or lymph nodes.  The patient verbalized understanding of the proper use and possible adverse effects of ivermectin.  All of the patient's questions and concerns were addressed.
Minoxidil Counseling: Minoxidil is a topical medication which can increase blood flow where it is applied. It is uncertain how this medication increases hair growth. Side effects are uncommon and include stinging and allergic reactions.
Tranexamic Acid Pregnancy And Lactation Text: It is unknown if this medication is safe during pregnancy or breast feeding.
Infliximab Pregnancy And Lactation Text: This medication is Pregnancy Category B and is considered safe during pregnancy. It is unknown if this medication is excreted in breast milk.
Cimetidine Counseling:  I discussed with the patient the risks of Cimetidine including but not limited to gynecomastia, headache, diarrhea, nausea, drowsiness, arrhythmias, pancreatitis, skin rashes, psychosis, bone marrow suppression and kidney toxicity.
Cimzia Pregnancy And Lactation Text: This medication crosses the placenta but can be considered safe in certain situations. Cimzia may be excreted in breast milk.
Niacinamide Counseling: I recommended taking niacin or niacinamide, also know as vitamin B3, twice daily. Recent evidence suggests that taking vitamin B3 (500 mg twice daily) can reduce the risk of actinic keratoses and non-melanoma skin cancers. Side effects of vitamin B3 include flushing and headache.
Erivedge Counseling- I discussed with the patient the risks of Erivedge including but not limited to nausea, vomiting, diarrhea, constipation, weight loss, changes in the sense of taste, decreased appetite, muscle spasms, and hair loss.  The patient verbalized understanding of the proper use and possible adverse effects of Erivedge.  All of the patient's questions and concerns were addressed.
Bactrim Pregnancy And Lactation Text: This medication is Pregnancy Category D and is known to cause fetal risk.  It is also excreted in breast milk.
Griseofulvin Counseling:  I discussed with the patient the risks of griseofulvin including but not limited to photosensitivity, cytopenia, liver damage, nausea/vomiting and severe allergy.  The patient understands that this medication is best absorbed when taken with a fatty meal (e.g., ice cream or french fries).
Libtayo Counseling- I discussed with the patient the risks of Libtayo including but not limited to nausea, vomiting, diarrhea, and bone or muscle pain.  The patient verbalized understanding of the proper use and possible adverse effects of Libtayo.  All of the patient's questions and concerns were addressed.
Bexarotene Counseling:  I discussed with the patient the risks of bexarotene including but not limited to hair loss, dry lips/skin/eyes, liver abnormalities, hyperlipidemia, pancreatitis, depression/suicidal ideation, photosensitivity, drug rash/allergic reactions, hypothyroidism, anemia, leukopenia, infection, cataracts, and teratogenicity.  Patient understands that they will need regular blood tests to check lipid profile, liver function tests, white blood cell count, thyroid function tests and pregnancy test if applicable.
Cantharidin Pregnancy And Lactation Text: This medication has not been proven safe during pregnancy. It is unknown if this medication is excreted in breast milk.
Winlevi Counseling:  I discussed with the patient the risks of topical clascoterone including but not limited to erythema, scaling, itching, and stinging. Patient voiced their understanding.
Metronidazole Counseling:  I discussed with the patient the risks of metronidazole including but not limited to seizures, nausea/vomiting, a metallic taste in the mouth, nausea/vomiting and severe allergy.
Opioid Counseling: I discussed with the patient the potential side effects of opioids including but not limited to addiction, altered mental status, and depression. I stressed avoiding alcohol, benzodiazepines, muscle relaxants and sleep aids unless specifically okayed by a physician. The patient verbalized understanding of the proper use and possible adverse effects of opioids. All of the patient's questions and concerns were addressed. They were instructed to flush the remaining pills down the toilet if they did not need them for pain.
Cellcept Counseling:  I discussed with the patient the risks of mycophenolate mofetil including but not limited to infection/immunosuppression, GI upset, hypokalemia, hypercholesterolemia, bone marrow suppression, lymphoproliferative disorders, malignancy, GI ulceration/bleed/perforation, colitis, interstitial lung disease, kidney failure, progressive multifocal leukoencephalopathy, and birth defects.  The patient understands that monitoring is required including a baseline creatinine and regular CBC testing. In addition, patient must alert us immediately if symptoms of infection or other concerning signs are noted.
Finasteride Female Counseling: Finasteride Counseling:  I discussed with the patient the risks of use of finasteride including but not limited to decreased libido and sexual dysfunction. Explained the teratogenic nature of the medication and stressed the importance of not getting pregnant during treatment. All of the patient's questions and concerns were addressed.
Dapsone Counseling: I discussed with the patient the risks of dapsone including but not limited to hemolytic anemia, agranulocytosis, rashes, methemoglobinemia, kidney failure, peripheral neuropathy, headaches, GI upset, and liver toxicity.  Patients who start dapsone require monitoring including baseline LFTs and weekly CBCs for the first month, then every month thereafter.  The patient verbalized understanding of the proper use and possible adverse effects of dapsone.  All of the patient's questions and concerns were addressed.
Stelara Counseling:  I discussed with the patient the risks of ustekinumab including but not limited to immunosuppression, malignancy, posterior leukoencephalopathy syndrome, and serious infections.  The patient understands that monitoring is required including a PPD at baseline and must alert us or the primary physician if symptoms of infection or other concerning signs are noted.
Eucrisa Counseling: Patient may experience a mild burning sensation during topical application. Eucrisa is not approved in children less than 2 years of age.
Soolantra Pregnancy And Lactation Text: This medication is Pregnancy Category C. This medication is considered safe during breast feeding.
Cosentyx Counseling:  I discussed with the patient the risks of Cosentyx including but not limited to worsening of Crohn's disease, immunosuppression, allergic reactions and infections.  The patient understands that monitoring is required including a PPD at baseline and must alert us or the primary physician if symptoms of infection or other concerning signs are noted.
Azelaic Acid Counseling: Patient counseled that medicine may cause skin irritation and to avoid applying near the eyes.  In the event of skin irritation, the patient was advised to reduce the amount of the drug applied or use it less frequently.   The patient verbalized understanding of the proper use and possible adverse effects of azelaic acid.  All of the patient's questions and concerns were addressed.
Otezla Pregnancy And Lactation Text: This medication is Pregnancy Category C and it isn't known if it is safe during pregnancy. It is unknown if it is excreted in breast milk.
5-Fu Counseling: 5-Fluorouracil Counseling:  I discussed with the patient the risks of 5-fluorouracil including but not limited to erythema, scaling, itching, weeping, crusting, and pain.
Prednisone Pregnancy And Lactation Text: This medication is Pregnancy Category C and it isn't know if it is safe during pregnancy. This medication is excreted in breast milk.
Protopic Pregnancy And Lactation Text: This medication is Pregnancy Category C. It is unknown if this medication is excreted in breast milk when applied topically.
Griseofulvin Pregnancy And Lactation Text: This medication is Pregnancy Category X and is known to cause serious birth defects. It is unknown if this medication is excreted in breast milk but breast feeding should be avoided.
Rinvoq Pregnancy And Lactation Text: Based on animal studies, Rinvoq may cause embryo-fetal harm when administered to pregnant women.  The medication should not be used in pregnancy.  Breastfeeding is not recommended during treatment and for 6 days after the last dose.
Rituxan Counseling:  I discussed with the patient the risks of Rituxan infusions. Side effects can include infusion reactions, severe drug rashes including mucocutaneous reactions, reactivation of latent hepatitis and other infections and rarely progressive multifocal leukoencephalopathy.  All of the patient's questions and concerns were addressed.
Minoxidil Pregnancy And Lactation Text: This medication has not been assigned a Pregnancy Risk Category but animal studies failed to show danger with the topical medication. It is unknown if the medication is excreted in breast milk.
Topical Metronidazole Counseling: Metronidazole is a topical antibiotic medication. You may experience burning, stinging, redness, or allergic reactions.  Please call our office if you develop any problems from using this medication.
Humira Counseling:  I discussed with the patient the risks of adalimumab including but not limited to myelosuppression, immunosuppression, autoimmune hepatitis, demyelinating diseases, lymphoma, and serious infections.  The patient understands that monitoring is required including a PPD at baseline and must alert us or the primary physician if symptoms of infection or other concerning signs are noted.
Valtrex Counseling: I discussed with the patient the risks of valacyclovir including but not limited to kidney damage, nausea, vomiting and severe allergy.  The patient understands that if the infection seems to be worsening or is not improving, they are to call.
Detail Level: Zone
Niacinamide Pregnancy And Lactation Text: These medications are considered safe during pregnancy.
Finasteride Pregnancy And Lactation Text: This medication is absolutely contraindicated during pregnancy. It is unknown if it is excreted in breast milk.
Libtayo Pregnancy And Lactation Text: This medication is contraindicated in pregnancy and when breast feeding.
Topical Retinoid counseling:  Patient advised to apply a pea-sized amount only at bedtime and wait 30 minutes after washing their face before applying.  If too drying, patient may add a non-comedogenic moisturizer. The patient verbalized understanding of the proper use and possible adverse effects of retinoids.  All of the patient's questions and concerns were addressed.
Cephalexin Counseling: I counseled the patient regarding use of cephalexin as an antibiotic for prophylactic and/or therapeutic purposes. Cephalexin (commonly prescribed under brand name Keflex) is a cephalosporin antibiotic which is active against numerous classes of bacteria, including most skin bacteria. Side effects may include nausea, diarrhea, gastrointestinal upset, rash, hives, yeast infections, and in rare cases, hepatitis, kidney disease, seizures, fever, confusion, neurologic symptoms, and others. Patients with severe allergies to penicillin medications are cautioned that there is about a 10% incidence of cross-reactivity with cephalosporins. When possible, patients with penicillin allergies should use alternatives to cephalosporins for antibiotic therapy.
Bexarotene Pregnancy And Lactation Text: This medication is Pregnancy Category X and should not be given to women who are pregnant or may become pregnant. This medication should not be used if you are breast feeding.
Dapsone Pregnancy And Lactation Text: This medication is Pregnancy Category C and is not considered safe during pregnancy or breast feeding.
Winlevi Pregnancy And Lactation Text: This medication is considered safe during pregnancy and breastfeeding.
Metronidazole Pregnancy And Lactation Text: This medication is Pregnancy Category B and considered safe during pregnancy.  It is also excreted in breast milk.
Oxybutynin Counseling:  I discussed with the patient the risks of oxybutynin including but not limited to skin rash, drowsiness, dry mouth, difficulty urinating, and blurred vision.
Opioid Pregnancy And Lactation Text: These medications can lead to premature delivery and should be avoided during pregnancy. These medications are also present in breast milk in small amounts.
Doxepin Counseling:  Patient advised that the medication is sedating and not to drive a car after taking this medication. Patient informed of potential adverse effects including but not limited to dry mouth, urinary retention, and blurry vision.  The patient verbalized understanding of the proper use and possible adverse effects of doxepin.  All of the patient's questions and concerns were addressed.
Azelaic Acid Pregnancy And Lactation Text: This medication is considered safe during pregnancy and breast feeding.
Sotyktu Counseling:  I discussed the most common side effects of Sotyktu including: common cold, sore throat, sinus infections, cold sores, canker sores, folliculitis, and acne.  I also discussed more serious side effects of Sotyktu including but not limited to: serious allergic reactions; increased risk for infections such as TB; cancers such as lymphomas; rhabdomyolysis and elevated CPK; and elevated triglycerides and liver enzymes. 
Nsaids Counseling: NSAID Counseling: I discussed with the patient that NSAIDs should be taken with food. Prolonged use of NSAIDs can result in the development of stomach ulcers.  Patient advised to stop taking NSAIDs if abdominal pain occurs.  The patient verbalized understanding of the proper use and possible adverse effects of NSAIDs.  All of the patient's questions and concerns were addressed.
Qbrexza Counseling:  I discussed with the patient the risks of Qbrexza including but not limited to headache, mydriasis, blurred vision, dry eyes, nasal dryness, dry mouth, dry throat, dry skin, urinary hesitation, and constipation.  Local skin reactions including erythema, burning, stinging, and itching can also occur.
Sarecycline Counseling: Patient advised regarding possible photosensitivity and discoloration of the teeth, skin, lips, tongue and gums.  Patient instructed to avoid sunlight, if possible.  When exposed to sunlight, patients should wear protective clothing, sunglasses, and sunscreen.  The patient was instructed to call the office immediately if the following severe adverse effects occur:  hearing changes, easy bruising/bleeding, severe headache, or vision changes.  The patient verbalized understanding of the proper use and possible adverse effects of sarecycline.  All of the patient's questions and concerns were addressed.
Itraconazole Counseling:  I discussed with the patient the risks of itraconazole including but not limited to liver damage, nausea/vomiting, neuropathy, and severe allergy.  The patient understands that this medication is best absorbed when taken with acidic beverages such as non-diet cola or ginger ale.  The patient understands that monitoring is required including baseline LFTs and repeat LFTs at intervals.  The patient understands that they are to contact us or the primary physician if concerning signs are noted.
Isotretinoin Counseling: Patient should get monthly blood tests, not donate blood, not drive at night if vision affected, not share medication, and not undergo elective surgery for 6 months after tx completed. Side effects reviewed, pt to contact office should one occur.
Mirvaso Counseling: Mirvaso is a topical medication which can decrease superficial blood flow where applied. Side effects are uncommon and include stinging, redness and allergic reactions.
Rituxan Pregnancy And Lactation Text: This medication is Pregnancy Category C and it isn't know if it is safe during pregnancy. It is unknown if this medication is excreted in breast milk but similar antibodies are known to be excreted.
Valtrex Pregnancy And Lactation Text: this medication is Pregnancy Category B and is considered safe during pregnancy. This medication is not directly found in breast milk but it's metabolite acyclovir is present.
Topical Metronidazole Pregnancy And Lactation Text: This medication is Pregnancy Category B and considered safe during pregnancy.  It is also considered safe to use while breastfeeding.
Cyclophosphamide Counseling:  I discussed with the patient the risks of cyclophosphamide including but not limited to hair loss, hormonal abnormalities, decreased fertility, abdominal pain, diarrhea, nausea and vomiting, bone marrow suppression and infection. The patient understands that monitoring is required while taking this medication.
Cibinqo Counseling: I discussed with the patient the risks of Cibinqo therapy including but not limited to common cold, nausea, headache, cold sores, increased blood CPK levels, dizziness, UTIs, fatigue, acne, and vomitting. Live vaccines should be avoided.  This medication has been linked to serious infections; higher rate of mortality; malignancy and lymphoproliferative disorders; major adverse cardiovascular events; thrombosis; thrombocytopenia and lymphopenia; lipid elevations; and retinal detachment.
Birth Control Pills Counseling: Birth Control Pill Counseling: I discussed with the patient the potential side effects of OCPs including but not limited to increased risk of stroke, heart attack, thrombophlebitis, deep venous thrombosis, hepatic adenomas, breast changes, GI upset, headaches, and depression.  The patient verbalized understanding of the proper use and possible adverse effects of OCPs. All of the patient's questions and concerns were addressed.
Taltz Counseling: I discussed with the patient the risks of ixekizumab including but not limited to immunosuppression, serious infections, worsening of inflammatory bowel disease and drug reactions.  The patient understands that monitoring is required including a PPD at baseline and must alert us or the primary physician if symptoms of infection or other concerning signs are noted.
Adbry Counseling: I discussed with the patient the risks of tralokinumab including but not limited to eye infection and irritation, cold sores, injection site reactions, worsening of asthma, allergic reactions and increased risk of parasitic infection.  Live vaccines should be avoided while taking tralokinumab. The patient understands that monitoring is required and they must alert us or the primary physician if symptoms of infection or other concerning signs are noted.
Odomzo Counseling- I discussed with the patient the risks of Odomzo including but not limited to nausea, vomiting, diarrhea, constipation, weight loss, changes in the sense of taste, decreased appetite, muscle spasms, and hair loss.  The patient verbalized understanding of the proper use and possible adverse effects of Odomzo.  All of the patient's questions and concerns were addressed.
VTAMA Counseling: I discussed with the patient that VTAMA is not for use in the eyes, mouth or mouth. They should call the office if they develop any signs of allergic reactions to VTAMA. The patient verbalized understanding of the proper use and possible adverse effects of VTAMA.  All of the patient's questions and concerns were addressed.
Hydroquinone Counseling:  Patient advised that medication may result in skin irritation, lightening (hypopigmentation), dryness, and burning.  In the event of skin irritation, the patient was advised to reduce the amount of the drug applied or use it less frequently.  Rarely, spots that are treated with hydroquinone can become darker (pseudoochronosis).  Should this occur, patient instructed to stop medication and call the office. The patient verbalized understanding of the proper use and possible adverse effects of hydroquinone.  All of the patient's questions and concerns were addressed.
Cephalexin Pregnancy And Lactation Text: This medication is Pregnancy Category B and considered safe during pregnancy.  It is also excreted in breast milk but can be used safely for shorter doses.
Include Pregnancy/Lactation Warning?: No
Propranolol Pregnancy And Lactation Text: This medication is Pregnancy Category C and it isn't known if it is safe during pregnancy. It is excreted in breast milk.
Qbrexza Pregnancy And Lactation Text: There is no available data on Qbrexza use in pregnant women.  There is no available data on Qbrexza use in lactation.
Glycopyrrolate Counseling:  I discussed with the patient the risks of glycopyrrolate including but not limited to skin rash, drowsiness, dry mouth, difficulty urinating, and blurred vision.
Gabapentin Counseling: I discussed with the patient the risks of gabapentin including but not limited to dizziness, somnolence, fatigue and ataxia.
Minocycline Counseling: Patient advised regarding possible photosensitivity and discoloration of the teeth, skin, lips, tongue and gums.  Patient instructed to avoid sunlight, if possible.  When exposed to sunlight, patients should wear protective clothing, sunglasses, and sunscreen.  The patient was instructed to call the office immediately if the following severe adverse effects occur:  hearing changes, easy bruising/bleeding, severe headache, or vision changes.  The patient verbalized understanding of the proper use and possible adverse effects of minocycline.  All of the patient's questions and concerns were addressed.
Doxepin Pregnancy And Lactation Text: This medication is Pregnancy Category C and it isn't known if it is safe during pregnancy. It is also excreted in breast milk and breast feeding isn't recommended.
Dupixent Counseling: I discussed with the patient the risks of dupilumab including but not limited to eye infection and irritation, cold sores, injection site reactions, worsening of asthma, allergic reactions and increased risk of parasitic infection.  Live vaccines should be avoided while taking dupilumab. Dupilumab will also interact with certain medications such as warfarin and cyclosporine. The patient understands that monitoring is required and they must alert us or the primary physician if symptoms of infection or other concerning signs are noted.
Drysol Counseling:  I discussed with the patient the risks of drysol/aluminum chloride including but not limited to skin rash, itching, irritation, burning.
Sarecycline Pregnancy And Lactation Text: This medication is Pregnancy Category D and not consider safe during pregnancy. It is also excreted in breast milk.
Isotretinoin Pregnancy And Lactation Text: This medication is Pregnancy Category X and is considered extremely dangerous during pregnancy. It is unknown if it is excreted in breast milk.
Nsaids Pregnancy And Lactation Text: These medications are considered safe up to 30 weeks gestation. It is excreted in breast milk.
Siliq Counseling:  I discussed with the patient the risks of Siliq including but not limited to new or worsening depression, suicidal thoughts and behavior, immunosuppression, malignancy, posterior leukoencephalopathy syndrome, and serious infections.  The patient understands that monitoring is required including a PPD at baseline and must alert us or the primary physician if symptoms of infection or other concerning signs are noted. There is also a special program designed to monitor depression which is required with Siliq.
Benzoyl Peroxide Counseling: Patient counseled that medicine may cause skin irritation and bleach clothing.  In the event of skin irritation, the patient was advised to reduce the amount of the drug applied or use it less frequently.   The patient verbalized understanding of the proper use and possible adverse effects of benzoyl peroxide.  All of the patient's questions and concerns were addressed.
Sotyktu Pregnancy And Lactation Text: There is insufficient data to evaluate whether or not Sotyktu is safe to use during pregnancy.   It is not known if Sotyktu passes into breast milk and whether or not it is safe to use when breastfeeding.  
Arava Counseling:  Patient counseled regarding adverse effects of Arava including but not limited to nausea, vomiting, abnormalities in liver function tests. Patients may develop mouth sores, rash, diarrhea, and abnormalities in blood counts. The patient understands that monitoring is required including LFTs and blood counts.  There is a rare possibility of scarring of the liver and lung problems that can occur when taking methotrexate. Persistent nausea, loss of appetite, pale stools, dark urine, cough, and shortness of breath should be reported immediately. Patient advised to discontinue Arava treatment and consult with a physician prior to attempting conception. The patient will have to undergo a treatment to eliminate Arava from the body prior to conception.
Clindamycin Counseling: I counseled the patient regarding use of clindamycin as an antibiotic for prophylactic and/or therapeutic purposes. Clindamycin is active against numerous classes of bacteria, including skin bacteria. Side effects may include nausea, diarrhea, gastrointestinal upset, rash, hives, yeast infections, and in rare cases, colitis.
Mirvaso Pregnancy And Lactation Text: This medication has not been assigned a Pregnancy Risk Category. It is unknown if the medication is excreted in breast milk.
Topical Steroids Counseling: I discussed with the patient that prolonged use of topical steroids can result in the increased appearance of superficial blood vessels (telangiectasias), lightening (hypopigmentation) and thinning of the skin (atrophy).  Patient understands to avoid using high potency steroids in skin folds, the groin or the face.  The patient verbalized understanding of the proper use and possible adverse effects of topical steroids.  All of the patient's questions and concerns were addressed.
Adbry Pregnancy And Lactation Text: It is unknown if this medication will adversely affect pregnancy or breast feeding.
Cibinqo Pregnancy And Lactation Text: It is unknown if this medication will adversely affect pregnancy or breast feeding.  You should not take this medication if you are currently pregnant or planning a pregnancy or while breastfeeding.
Tazorac Counseling:  Patient advised that medication is irritating and drying.  Patient may need to apply sparingly and wash off after an hour before eventually leaving it on overnight.  The patient verbalized understanding of the proper use and possible adverse effects of tazorac.  All of the patient's questions and concerns were addressed.
SSKI Counseling:  I discussed with the patient the risks of SSKI including but not limited to thyroid abnormalities, metallic taste, GI upset, fever, headache, acne, arthralgias, paraesthesias, lymphadenopathy, easy bleeding, arrhythmias, and allergic reaction.
Hyrimoz Counseling:  I discussed with the patient the risks of adalimumab including but not limited to myelosuppression, immunosuppression, autoimmune hepatitis, demyelinating diseases, lymphoma, and serious infections.  The patient understands that monitoring is required including a PPD at baseline and must alert us or the primary physician if symptoms of infection or other concerning signs are noted.
Propranolol Counseling:  I discussed with the patient the risks of propranolol including but not limited to low heart rate, low blood pressure, low blood sugar, restlessness and increased cold sensitivity. They should call the office if they experience any of these side effects.
Cyclophosphamide Pregnancy And Lactation Text: This medication is Pregnancy Category D and it isn't considered safe during pregnancy. This medication is excreted in breast milk.
Birth Control Pills Pregnancy And Lactation Text: This medication should be avoided if pregnant and for the first 30 days post-partum.
Glycopyrrolate Pregnancy And Lactation Text: This medication is Pregnancy Category B and is considered safe during pregnancy. It is unknown if it is excreted breast milk.
Rhofade Counseling: Rhofade is a topical medication which can decrease superficial blood flow where applied. Side effects are uncommon and include stinging, redness and allergic reactions.
Vtama Pregnancy And Lactation Text: It is unknown if this medication can cause problems during pregnancy and breastfeeding.
Topical Steroids Applications Pregnancy And Lactation Text: Most topical steroids are considered safe to use during pregnancy and lactation.  Any topical steroid applied to the breast or nipple should be washed off before breastfeeding.
Tetracycline Counseling: Patient counseled regarding possible photosensitivity and increased risk for sunburn.  Patient instructed to avoid sunlight, if possible.  When exposed to sunlight, patients should wear protective clothing, sunglasses, and sunscreen.  The patient was instructed to call the office immediately if the following severe adverse effects occur:  hearing changes, easy bruising/bleeding, severe headache, or vision changes.  The patient verbalized understanding of the proper use and possible adverse effects of tetracycline.  All of the patient's questions and concerns were addressed. Patient understands to avoid pregnancy while on therapy due to potential birth defects.
Dupixent Pregnancy And Lactation Text: This medication likely crosses the placenta but the risk for the fetus is uncertain. This medication is excreted in breast milk.
Hydroxyzine Counseling: Patient advised that the medication is sedating and not to drive a car after taking this medication.  Patient informed of potential adverse effects including but not limited to dry mouth, urinary retention, and blurry vision.  The patient verbalized understanding of the proper use and possible adverse effects of hydroxyzine.  All of the patient's questions and concerns were addressed.
Benzoyl Peroxide Pregnancy And Lactation Text: This medication is Pregnancy Category C. It is unknown if benzoyl peroxide is excreted in breast milk.
Clindamycin Pregnancy And Lactation Text: This medication can be used in pregnancy if certain situations. Clindamycin is also present in breast milk.
Tremfya Counseling: I discussed with the patient the risks of guselkumab including but not limited to immunosuppression, serious infections, worsening of inflammatory bowel disease and drug reactions.  The patient understands that monitoring is required including a PPD at baseline and must alert us or the primary physician if symptoms of infection or other concerning signs are noted.
Olanzapine Counseling- I discussed with the patient the common side effects of olanzapine including but are not limited to: lack of energy, dry mouth, increased appetite, sleepiness, tremor, constipation, dizziness, changes in behavior, or restlessness.  Explained that teenagers are more likely to experience headaches, abdominal pain, pain in the arms or legs, tiredness, and sleepiness.  Serious side effects include but are not limited: increased risk of death in elderly patients who are confused, have memory loss, or dementia-related psychosis; hyperglycemia; increased cholesterol and triglycerides; and weight gain.
Cyclosporine Counseling:  I discussed with the patient the risks of cyclosporine including but not limited to hypertension, gingival hyperplasia,myelosuppression, immunosuppression, liver damage, kidney damage, neurotoxicity, lymphoma, and serious infections. The patient understands that monitoring is required including baseline blood pressure, CBC, CMP, lipid panel and uric acid, and then 1-2 times monthly CMP and blood pressure.
Ketoconazole Counseling:   Patient counseled regarding improving absorption with orange juice.  Adverse effects include but are not limited to breast enlargement, headache, diarrhea, nausea, upset stomach, liver function test abnormalities, taste disturbance, and stomach pain.  There is a rare possibility of liver failure that can occur when taking ketoconazole. The patient understands that monitoring of LFTs may be required, especially at baseline. The patient verbalized understanding of the proper use and possible adverse effects of ketoconazole.  All of the patient's questions and concerns were addressed.
Spironolactone Counseling: Patient advised regarding risks of diarrhea, abdominal pain, hyperkalemia, birth defects (for female patients), liver toxicity and renal toxicity. The patient may need blood work to monitor liver and kidney function and potassium levels while on therapy. The patient verbalized understanding of the proper use and possible adverse effects of spironolactone.  All of the patient's questions and concerns were addressed.
Xeljanz Counseling: I discussed with the patient the risks of Xeljanz therapy including increased risk of infection, liver issues, headache, diarrhea, or cold symptoms. Live vaccines should be avoided. They were instructed to call if they have any problems.
High Dose Vitamin A Counseling: Side effects reviewed, pt to contact office should one occur.
Opzelura Counseling:  I discussed with the patient the risks of Opzelura including but not limited to nasopharngitis, bronchitis, ear infection, eosinophila, hives, diarrhea, folliculitis, tonsillitis, and rhinorrhea.  Taken orally, this medication has been linked to serious infections; higher rate of mortality; malignancy and lymphoproliferative disorders; major adverse cardiovascular events; thrombosis; thrombocytopenia, anemia, and neutropenia; and lipid elevations.
Litfulo Counseling: I discussed with the patient the risks of Litfulo therapy including but not limited to upper respiratory tract infections, shingles, cold sores, and nausea. Live vaccines should be avoided.  This medication has been linked to serious infections; higher rate of mortality; malignancy and lymphoproliferative disorders; major adverse cardiovascular events; thrombosis; gastrointestinal perforations; neutropenia; lymphopenia; anemia; liver enzyme elevations; and lipid elevations.
Sski Pregnancy And Lactation Text: This medication is Pregnancy Category D and isn't considered safe during pregnancy. It is excreted in breast milk.
Imiquimod Counseling:  I discussed with the patient the risks of imiquimod including but not limited to erythema, scaling, itching, weeping, crusting, and pain.  Patient understands that the inflammatory response to imiquimod is variable from person to person and was educated regarded proper titration schedule.  If flu-like symptoms develop, patient knows to discontinue the medication and contact us.
Zoryve Counseling:  I discussed with the patient that Zoryve is not for use in the eyes, mouth or vagina. The most commonly reported side effects include diarrhea, headache, insomnia, application site pain, upper respiratory tract infections, and urinary tract infections.  All of the patient's questions and concerns were addressed.
Bimzelx Counseling:  I discussed with the patient the risks of Bimzelx including but not limited to depression, immunosuppression, allergic reactions and infections.  The patient understands that monitoring is required including a PPD at baseline and must alert us or the primary physician if symptoms of infection or other concerning signs are noted.
Hydroxychloroquine Counseling:  I discussed with the patient that a baseline ophthalmologic exam is needed at the start of therapy and every year thereafter while on therapy. A CBC may also be warranted for monitoring.  The side effects of this medication were discussed with the patient, including but not limited to agranulocytosis, aplastic anemia, seizures, rashes, retinopathy, and liver toxicity. Patient instructed to call the office should any adverse effect occur.  The patient verbalized understanding of the proper use and possible adverse effects of Plaquenil.  All the patient's questions and concerns were addressed.
Quinolones Counseling:  I discussed with the patient the risks of fluoroquinolones including but not limited to GI upset, allergic reaction, drug rash, diarrhea, dizziness, photosensitivity, yeast infections, liver function test abnormalities, tendonitis/tendon rupture.
Tazorac Pregnancy And Lactation Text: This medication is not safe during pregnancy. It is unknown if this medication is excreted in breast milk.
Dutasteride Male Counseling: Dustasteride Counseling:  I discussed with the patient the risks of use of dutasteride including but not limited to decreased libido, decreased ejaculate volume, and gynecomastia. Women who can become pregnant should not handle medication.  All of the patient's questions and concerns were addressed.
Enbrel Counseling:  I discussed with the patient the risks of etanercept including but not limited to myelosuppression, immunosuppression, autoimmune hepatitis, demyelinating diseases, lymphoma, and infections.  The patient understands that monitoring is required including a PPD at baseline and must alert us or the primary physician if symptoms of infection or other concerning signs are noted.
Opzelura Pregnancy And Lactation Text: There is insufficient data to evaluate drug-associated risk for major birth defects, miscarriage, or other adverse maternal or fetal outcomes.  There is a pregnancy registry that monitors pregnancy outcomes in pregnant persons exposed to the medication during pregnancy.  It is unknown if this medication is excreted in breast milk.  Do not breastfeed during treatment and for about 4 weeks after the last dose.
Olanzapine Pregnancy And Lactation Text: This medication is pregnancy category C.   There are no adequate and well controlled trials with olanzapine in pregnant females.  Olanzapine should be used during pregnancy only if the potential benefit justifies the potential risk to the fetus.   In a study in lactating healthy women, olanzapine was excreted in breast milk.  It is recommended that women taking olanzapine should not breast feed.
Clofazimine Counseling:  I discussed with the patient the risks of clofazimine including but not limited to skin and eye pigmentation, liver damage, nausea/vomiting, gastrointestinal bleeding and allergy.
Doxycycline Counseling:  Patient counseled regarding possible photosensitivity and increased risk for sunburn.  Patient instructed to avoid sunlight, if possible.  When exposed to sunlight, patients should wear protective clothing, sunglasses, and sunscreen.  The patient was instructed to call the office immediately if the following severe adverse effects occur:  hearing changes, easy bruising/bleeding, severe headache, or vision changes.  The patient verbalized understanding of the proper use and possible adverse effects of doxycycline.  All of the patient's questions and concerns were addressed.
Simponi Counseling:  I discussed with the patient the risks of golimumab including but not limited to myelosuppression, immunosuppression, autoimmune hepatitis, demyelinating diseases, lymphoma, and serious infections.  The patient understands that monitoring is required including a PPD at baseline and must alert us or the primary physician if symptoms of infection or other concerning signs are noted.
Topical Sulfur Applications Counseling: Topical Sulfur Counseling: Patient counseled that this medication may cause skin irritation or allergic reactions.  In the event of skin irritation, the patient was advised to reduce the amount of the drug applied or use it less frequently.   The patient verbalized understanding of the proper use and possible adverse effects of topical sulfur application.  All of the patient's questions and concerns were addressed.
Hydroxyzine Pregnancy And Lactation Text: This medication is not safe during pregnancy and should not be taken. It is also excreted in breast milk and breast feeding isn't recommended.
Carac Counseling:  I discussed with the patient the risks of Carac including but not limited to erythema, scaling, itching, weeping, crusting, and pain.
Ketoconazole Pregnancy And Lactation Text: This medication is Pregnancy Category C and it isn't know if it is safe during pregnancy. It is also excreted in breast milk and breast feeding isn't recommended.
Xelolegz Pregnancy And Lactation Text: This medication is Pregnancy Category D and is not considered safe during pregnancy.  The risk during breast feeding is also uncertain.
Azithromycin Counseling:  I discussed with the patient the risks of azithromycin including but not limited to GI upset, allergic reaction, drug rash, diarrhea, and yeast infections.
Spironolactone Pregnancy And Lactation Text: This medication can cause feminization of the male fetus and should be avoided during pregnancy. The active metabolite is also found in breast milk.
High Dose Vitamin A Pregnancy And Lactation Text: High dose vitamin A therapy is contraindicated during pregnancy and breast feeding.
Thalidomide Counseling: I discussed with the patient the risks of thalidomide including but not limited to birth defects, anxiety, weakness, chest pain, dizziness, cough and severe allergy.
Litfulo Pregnancy And Lactation Text: Based on animal studies, Lifulo may cause embryo-fetal harm when administered to pregnant women.  The medication should not be used in pregnancy.  Breastfeeding is not recommended during treatment.
Hydroxychloroquine Pregnancy And Lactation Text: This medication has been shown to cause fetal harm but it isn't assigned a Pregnancy Risk Category. There are small amounts excreted in breast milk.
Topical Clindamycin Counseling: Patient counseled that this medication may cause skin irritation or allergic reactions.  In the event of skin irritation, the patient was advised to reduce the amount of the drug applied or use it less frequently.   The patient verbalized understanding of the proper use and possible adverse effects of clindamycin.  All of the patient's questions and concerns were addressed.
Ilumya Counseling: I discussed with the patient the risks of tildrakizumab including but not limited to immunosuppression, malignancy, posterior leukoencephalopathy syndrome, and serious infections.  The patient understands that monitoring is required including a PPD at baseline and must alert us or the primary physician if symptoms of infection or other concerning signs are noted.
Solaraze Counseling:  I discussed with the patient the risks of Solaraze including but not limited to erythema, scaling, itching, weeping, crusting, and pain.
Dutasteride Female Counseling: Dutasteride Counseling:  I discussed with the patient the risks of use of dutasteride including but not limited to decreased libido and sexual dysfunction. Explained the teratogenic nature of the medication and stressed the importance of not getting pregnant during treatment. All of the patient's questions and concerns were addressed.

## 2024-05-01 NOTE — PROCEDURE: PRESCRIPTION MEDICATION MANAGEMENT
Continue Regimen: Fluorouracil 5% topical cream AA nose and face BID x2 weeks (may use cream at least twice a year)
Render In Strict Bullet Format?: No
Detail Level: Zone

## 2024-06-04 NOTE — PROGRESS NOTES
DOSES, THEN CALL 911) up to max of 3 total doses. If no relief after 1 dose, call 911. 12/7/22  Yes Divine Ivy MD   Coenzyme Q10-Vitamin E (QUNOL ULTRA COQ10) 100-150 MG-UNIT CAPS Take by mouth  Patient not taking: Reported on 6/5/2024    Provider, Rachael, MD     Allergies   Allergen Reactions    Sulfamethoxazole-Trimethoprim Rash    Sulfur Itching     Other reaction(s): Itching-Allergy    Naproxen Itching    Ampicillin Hives    Atorvastatin Itching    Farxiga [Dapagliflozin] Hives    Sitagliptin      Januvia: Other reaction(s): muscle aches                                     Sulfa Antibiotics Hives     Past Medical History:   Diagnosis Date    Bilateral carotid artery stenosis     Cancer (HCC)     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  1/26/23    Chronic pain     FLORECITA-- multi    Coronary artery disease     mi 7/2012-- stents x 2  - 2012-- followed by dr holley; CABG 02/15/2023    Dyslipidemia     managed with med    Enlarged prostate     Environmental allergies     Essential hypertension     controlled with med    Hx of CABG 02/15/2023    Type 2 diabetes mellitus (HCC)     type 2-- does not check sqbs at home--     Past Surgical History:   Procedure Laterality Date    CARDIAC PROCEDURE N/A 02/09/2023    LEFT HEART CATH / CORONARY ANGIOGRAPHY performed by Chris Pate MD at [facility] CARDIAC CATH LAB    CAROTID ENDARTERECTOMY Right 8/21/2023    RIGHT CAROTID ENDARTERECTOMY performed by Alexandre Jensen MD at [facility] MAIN OR    COLONOSCOPY      CORONARY ARTERY BYPASS GRAFT N/A 2/15/2023    CABG CORONARY ARTERY BYPASS (CABGX3), LIMA, ENDOSCOPIC GREATER SAPHENOUS VEIN HARVEST LEFT LEG, TRANSEASOPHAGEAL ECHOCARDIOGRAM performed by Francisco Lew MD at [facility] MAIN OR    DIAGNOSTIC CARDIAC CATH LAB PROCEDURE      FACIAL SURGERY Left 01/26/2023    MOHs on left side of nose    PTCA  07/2012     Family History   Problem Relation Age of Onset    Heart Attack Mother     Dementia Mother     Heart Attack Father

## 2024-06-05 ENCOUNTER — OFFICE VISIT (OUTPATIENT)
Age: 74
End: 2024-06-05
Payer: MEDICARE

## 2024-06-05 VITALS
WEIGHT: 163.4 LBS | SYSTOLIC BLOOD PRESSURE: 134 MMHG | DIASTOLIC BLOOD PRESSURE: 80 MMHG | HEART RATE: 78 BPM | BODY MASS INDEX: 23.39 KG/M2 | HEIGHT: 70 IN

## 2024-06-05 DIAGNOSIS — Z95.1 S/P CABG X 3: ICD-10-CM

## 2024-06-05 DIAGNOSIS — E11.9 TYPE 2 DIABETES MELLITUS WITHOUT COMPLICATION, WITH LONG-TERM CURRENT USE OF INSULIN (HCC): Chronic | ICD-10-CM

## 2024-06-05 DIAGNOSIS — I65.21 ASYMPTOMATIC STENOSIS OF RIGHT CAROTID ARTERY: ICD-10-CM

## 2024-06-05 DIAGNOSIS — I25.118 CORONARY ARTERY DISEASE OF NATIVE ARTERY OF NATIVE HEART WITH STABLE ANGINA PECTORIS (HCC): Chronic | ICD-10-CM

## 2024-06-05 DIAGNOSIS — Z79.4 TYPE 2 DIABETES MELLITUS WITHOUT COMPLICATION, WITH LONG-TERM CURRENT USE OF INSULIN (HCC): Chronic | ICD-10-CM

## 2024-06-05 DIAGNOSIS — I10 ESSENTIAL HYPERTENSION: Primary | Chronic | ICD-10-CM

## 2024-06-05 PROCEDURE — 3046F HEMOGLOBIN A1C LEVEL >9.0%: CPT | Performed by: INTERNAL MEDICINE

## 2024-06-05 PROCEDURE — 1123F ACP DISCUSS/DSCN MKR DOCD: CPT | Performed by: INTERNAL MEDICINE

## 2024-06-05 PROCEDURE — 3079F DIAST BP 80-89 MM HG: CPT | Performed by: INTERNAL MEDICINE

## 2024-06-05 PROCEDURE — 3017F COLORECTAL CA SCREEN DOC REV: CPT | Performed by: INTERNAL MEDICINE

## 2024-06-05 PROCEDURE — G8420 CALC BMI NORM PARAMETERS: HCPCS | Performed by: INTERNAL MEDICINE

## 2024-06-05 PROCEDURE — 1036F TOBACCO NON-USER: CPT | Performed by: INTERNAL MEDICINE

## 2024-06-05 PROCEDURE — 93000 ELECTROCARDIOGRAM COMPLETE: CPT | Performed by: INTERNAL MEDICINE

## 2024-06-05 PROCEDURE — G8427 DOCREV CUR MEDS BY ELIG CLIN: HCPCS | Performed by: INTERNAL MEDICINE

## 2024-06-05 PROCEDURE — 2022F DILAT RTA XM EVC RTNOPTHY: CPT | Performed by: INTERNAL MEDICINE

## 2024-06-05 PROCEDURE — 99214 OFFICE O/P EST MOD 30 MIN: CPT | Performed by: INTERNAL MEDICINE

## 2024-06-05 PROCEDURE — 3075F SYST BP GE 130 - 139MM HG: CPT | Performed by: INTERNAL MEDICINE

## 2024-06-05 ASSESSMENT — ENCOUNTER SYMPTOMS: SHORTNESS OF BREATH: 0

## 2024-11-04 ENCOUNTER — APPOINTMENT (RX ONLY)
Dept: URBAN - NONMETROPOLITAN AREA CLINIC 1 | Facility: CLINIC | Age: 74
Setting detail: DERMATOLOGY
End: 2024-11-04

## 2024-11-04 DIAGNOSIS — L85.8 OTHER SPECIFIED EPIDERMAL THICKENING: ICD-10-CM

## 2024-11-04 DIAGNOSIS — D22 MELANOCYTIC NEVI: ICD-10-CM

## 2024-11-04 DIAGNOSIS — Z85.828 PERSONAL HISTORY OF OTHER MALIGNANT NEOPLASM OF SKIN: ICD-10-CM | Status: RESOLVED

## 2024-11-04 DIAGNOSIS — L82.1 OTHER SEBORRHEIC KERATOSIS: ICD-10-CM

## 2024-11-04 DIAGNOSIS — L57.8 OTHER SKIN CHANGES DUE TO CHRONIC EXPOSURE TO NONIONIZING RADIATION: ICD-10-CM | Status: STABLE

## 2024-11-04 PROBLEM — D22.9 MELANOCYTIC NEVI, UNSPECIFIED: Status: ACTIVE | Noted: 2024-11-04

## 2024-11-04 PROCEDURE — ? TREATMENT REGIMEN

## 2024-11-04 PROCEDURE — ? COUNSELING

## 2024-11-04 PROCEDURE — ? FULL BODY SKIN EXAM

## 2024-11-04 PROCEDURE — 99213 OFFICE O/P EST LOW 20 MIN: CPT

## 2024-11-04 ASSESSMENT — LOCATION SIMPLE DESCRIPTION DERM
LOCATION SIMPLE: SUPERIOR FOREHEAD
LOCATION SIMPLE: PLANTAR SURFACE OF RIGHT 1ST TOE
LOCATION SIMPLE: RIGHT FOREARM
LOCATION SIMPLE: LEFT FOREARM
LOCATION SIMPLE: PLANTAR SURFACE OF LEFT 1ST TOE
LOCATION SIMPLE: RIGHT UPPER BACK
LOCATION SIMPLE: NOSE

## 2024-11-04 ASSESSMENT — LOCATION DETAILED DESCRIPTION DERM
LOCATION DETAILED: LEFT MEDIAL PLANTAR 1ST TOE
LOCATION DETAILED: LEFT DISTAL DORSAL FOREARM
LOCATION DETAILED: RIGHT DISTAL DORSAL FOREARM
LOCATION DETAILED: RIGHT MID-UPPER BACK
LOCATION DETAILED: SUPERIOR MID FOREHEAD
LOCATION DETAILED: RIGHT NASAL DORSUM
LOCATION DETAILED: RIGHT MEDIAL PLANTAR 1ST TOE

## 2024-11-04 ASSESSMENT — LOCATION ZONE DERM
LOCATION ZONE: TOE
LOCATION ZONE: FACE
LOCATION ZONE: NOSE
LOCATION ZONE: ARM
LOCATION ZONE: TRUNK

## 2024-11-04 NOTE — PROCEDURE: TREATMENT REGIMEN
Detail Level: Detailed
Plan: Apply sunscreen 30spf or greater every morning.
Plan: OTC Ordinary brand glycolic acid spray was discussed and recommended for the patient to try daily to feet to reduce roughness.

## 2024-12-05 NOTE — PROGRESS NOTES
tablet by mouth daily   Yes Rachael Wyman MD   glipiZIDE (GLUCOTROL) 10 MG tablet Take 1 tablet by mouth 2 times daily (before meals) 1/10/23  Yes Divine Ivy MD   aspirin 81 MG EC tablet Take 1 tablet by mouth daily 4/28/16  Yes Rachael Wyman MD   cyanocobalamin 1000 MCG tablet Take 1 tablet by mouth nightly   Yes Rachael Wyman MD   ferrous sulfate (IRON 325) 325 (65 Fe) MG tablet Take 1 tablet by mouth daily (with breakfast) 7/27/22  Yes Rachael Wyman MD   finasteride (PROSCAR) 5 MG tablet Take 1 tablet by mouth daily 6/21/19  Yes Rachael Wyman MD   HYDROcodone-acetaminophen (NORCO)  MG per tablet Take 1 tablet by mouth in the morning and at bedtime. 11/13/22  Yes Rachael Wyman MD   insulin glargine (LANTUS;BASAGLAR) 100 UNIT/ML injection pen Inject 20 Units into the skin nightly 7/27/22  Yes Rachael Wyman MD   metFORMIN (GLUCOPHAGE) 1000 MG tablet Take 1 tablet by mouth 2 times daily (with meals) 4/16/21  Yes Rachael Wyman MD   rosuvastatin (CRESTOR) 40 MG tablet Take 1 tablet by mouth nightly   Yes Rachael Wyman MD   naloxone 4 MG/0.1ML LIQD nasal spray 1 spray once as needed 10/13/21  Yes Rachael Wyman MD   nitroGLYCERIN (NITROSTAT) 0.4 MG SL tablet Place 1 tablet under the tongue every 5 minutes as needed for Chest pain (X 3 DOSES, THEN CALL 911) up to max of 3 total doses. If no relief after 1 dose, call 911. 12/7/22  Yes Divine Ivy MD   turmeric 500 MG CAPS Take 2 capsules by mouth daily  Patient not taking: Reported on 12/6/2024    Rachael Wyman MD   alogliptin (NESINA) 12.5 MG TABS tablet Take 1 tablet by mouth daily  Patient not taking: Reported on 12/6/2024 1/28/21   Rachael Wyman MD   SURE COMFORT PEN NEEDLES 32G X 6 MM MISC  10/17/22   Rachael Wyman MD     Allergies   Allergen Reactions    Sulfamethoxazole-Trimethoprim Rash    Sulfur Itching     Other reaction(s):

## 2024-12-06 ENCOUNTER — OFFICE VISIT (OUTPATIENT)
Age: 74
End: 2024-12-06
Payer: MEDICARE

## 2024-12-06 VITALS
BODY MASS INDEX: 23.13 KG/M2 | HEART RATE: 72 BPM | WEIGHT: 161.6 LBS | SYSTOLIC BLOOD PRESSURE: 138 MMHG | DIASTOLIC BLOOD PRESSURE: 60 MMHG | HEIGHT: 70 IN

## 2024-12-06 DIAGNOSIS — I10 ESSENTIAL HYPERTENSION: Chronic | ICD-10-CM

## 2024-12-06 DIAGNOSIS — E11.9 TYPE 2 DIABETES MELLITUS WITHOUT COMPLICATION, WITH LONG-TERM CURRENT USE OF INSULIN (HCC): Chronic | ICD-10-CM

## 2024-12-06 DIAGNOSIS — I25.118 CORONARY ARTERY DISEASE OF NATIVE ARTERY OF NATIVE HEART WITH STABLE ANGINA PECTORIS (HCC): Primary | Chronic | ICD-10-CM

## 2024-12-06 DIAGNOSIS — Z79.4 TYPE 2 DIABETES MELLITUS WITHOUT COMPLICATION, WITH LONG-TERM CURRENT USE OF INSULIN (HCC): Chronic | ICD-10-CM

## 2024-12-06 DIAGNOSIS — Z95.1 S/P CABG X 3: ICD-10-CM

## 2024-12-06 DIAGNOSIS — I65.21 ASYMPTOMATIC STENOSIS OF RIGHT CAROTID ARTERY: ICD-10-CM

## 2024-12-06 PROCEDURE — 1036F TOBACCO NON-USER: CPT | Performed by: INTERNAL MEDICINE

## 2024-12-06 PROCEDURE — G8420 CALC BMI NORM PARAMETERS: HCPCS | Performed by: INTERNAL MEDICINE

## 2024-12-06 PROCEDURE — G8427 DOCREV CUR MEDS BY ELIG CLIN: HCPCS | Performed by: INTERNAL MEDICINE

## 2024-12-06 PROCEDURE — 3078F DIAST BP <80 MM HG: CPT | Performed by: INTERNAL MEDICINE

## 2024-12-06 PROCEDURE — 1123F ACP DISCUSS/DSCN MKR DOCD: CPT | Performed by: INTERNAL MEDICINE

## 2024-12-06 PROCEDURE — 1159F MED LIST DOCD IN RCRD: CPT | Performed by: INTERNAL MEDICINE

## 2024-12-06 PROCEDURE — 1160F RVW MEDS BY RX/DR IN RCRD: CPT | Performed by: INTERNAL MEDICINE

## 2024-12-06 PROCEDURE — 3075F SYST BP GE 130 - 139MM HG: CPT | Performed by: INTERNAL MEDICINE

## 2024-12-06 PROCEDURE — 3017F COLORECTAL CA SCREEN DOC REV: CPT | Performed by: INTERNAL MEDICINE

## 2024-12-06 PROCEDURE — 3052F HG A1C>EQUAL 8.0%<EQUAL 9.0%: CPT | Performed by: INTERNAL MEDICINE

## 2024-12-06 PROCEDURE — 1126F AMNT PAIN NOTED NONE PRSNT: CPT | Performed by: INTERNAL MEDICINE

## 2024-12-06 PROCEDURE — G8484 FLU IMMUNIZE NO ADMIN: HCPCS | Performed by: INTERNAL MEDICINE

## 2024-12-06 PROCEDURE — 2022F DILAT RTA XM EVC RTNOPTHY: CPT | Performed by: INTERNAL MEDICINE

## 2024-12-06 PROCEDURE — 99214 OFFICE O/P EST MOD 30 MIN: CPT | Performed by: INTERNAL MEDICINE

## 2024-12-06 RX ORDER — SITAGLIPTIN 25 MG/1
25 TABLET ORAL
COMMUNITY

## 2024-12-06 ASSESSMENT — ENCOUNTER SYMPTOMS: SHORTNESS OF BREATH: 0

## 2025-05-05 ENCOUNTER — APPOINTMENT (OUTPATIENT)
Dept: URBAN - NONMETROPOLITAN AREA CLINIC 1 | Facility: CLINIC | Age: 75
Setting detail: DERMATOLOGY
End: 2025-05-05

## 2025-05-05 DIAGNOSIS — D22 MELANOCYTIC NEVI: ICD-10-CM

## 2025-05-05 DIAGNOSIS — L57.8 OTHER SKIN CHANGES DUE TO CHRONIC EXPOSURE TO NONIONIZING RADIATION: ICD-10-CM | Status: STABLE

## 2025-05-05 DIAGNOSIS — L82.0 INFLAMED SEBORRHEIC KERATOSIS: ICD-10-CM

## 2025-05-05 DIAGNOSIS — D18.0 HEMANGIOMA: ICD-10-CM

## 2025-05-05 DIAGNOSIS — L57.0 ACTINIC KERATOSIS: ICD-10-CM

## 2025-05-05 DIAGNOSIS — L82.1 OTHER SEBORRHEIC KERATOSIS: ICD-10-CM

## 2025-05-05 DIAGNOSIS — L81.4 OTHER MELANIN HYPERPIGMENTATION: ICD-10-CM

## 2025-05-05 DIAGNOSIS — Z85.828 PERSONAL HISTORY OF OTHER MALIGNANT NEOPLASM OF SKIN: ICD-10-CM | Status: RESOLVED

## 2025-05-05 PROBLEM — D18.01 HEMANGIOMA OF SKIN AND SUBCUTANEOUS TISSUE: Status: ACTIVE | Noted: 2025-05-05

## 2025-05-05 PROBLEM — D22.5 MELANOCYTIC NEVI OF TRUNK: Status: ACTIVE | Noted: 2025-05-05

## 2025-05-05 PROCEDURE — 17000 DESTRUCT PREMALG LESION: CPT | Mod: 59

## 2025-05-05 PROCEDURE — ? FULL BODY SKIN EXAM

## 2025-05-05 PROCEDURE — ? LIQUID NITROGEN

## 2025-05-05 PROCEDURE — 99213 OFFICE O/P EST LOW 20 MIN: CPT | Mod: 25

## 2025-05-05 PROCEDURE — ? COUNSELING

## 2025-05-05 PROCEDURE — ? TREATMENT REGIMEN

## 2025-05-05 PROCEDURE — ? SUNSCREEN RECOMMENDATIONS

## 2025-05-05 PROCEDURE — 17110 DESTRUCTION B9 LES UP TO 14: CPT

## 2025-05-05 ASSESSMENT — LOCATION DETAILED DESCRIPTION DERM
LOCATION DETAILED: LEFT MID-UPPER BACK
LOCATION DETAILED: LEFT DISTAL ULNAR DORSAL FOREARM
LOCATION DETAILED: RIGHT SUPERIOR MEDIAL FOREHEAD
LOCATION DETAILED: LEFT MEDIAL UPPER BACK
LOCATION DETAILED: RIGHT PROXIMAL DORSAL FOREARM
LOCATION DETAILED: LEFT INFERIOR UPPER BACK
LOCATION DETAILED: LEFT ELBOW
LOCATION DETAILED: RIGHT SCAPHA
LOCATION DETAILED: LEFT PROXIMAL DORSAL FOREARM
LOCATION DETAILED: RIGHT NASAL DORSUM

## 2025-05-05 ASSESSMENT — LOCATION SIMPLE DESCRIPTION DERM
LOCATION SIMPLE: RIGHT FOREHEAD
LOCATION SIMPLE: RIGHT EAR
LOCATION SIMPLE: LEFT FOREARM
LOCATION SIMPLE: LEFT ELBOW
LOCATION SIMPLE: NOSE
LOCATION SIMPLE: LEFT UPPER BACK
LOCATION SIMPLE: RIGHT FOREARM

## 2025-05-05 ASSESSMENT — LOCATION ZONE DERM
LOCATION ZONE: FACE
LOCATION ZONE: TRUNK
LOCATION ZONE: EAR
LOCATION ZONE: NOSE
LOCATION ZONE: ARM

## 2025-05-05 NOTE — PROCEDURE: LIQUID NITROGEN
Post-Care Instructions: I reviewed with the patient in detail post-care instructions. Patient is to wear sunprotection, and avoid picking at any of the treated lesions. Pt may apply Vaseline to crusted or scabbing areas.
Render Post-Care Instructions In Note?: yes
Detail Level: Detailed
Consent: The patient's consent was obtained including but not limited to risks of crusting, scabbing, blistering, scarring, darker or lighter pigmentary change, recurrence, incomplete removal and infection.
Render Note In Bullet Format When Appropriate: No
Duration Of Freeze Thaw-Cycle (Seconds): 5
Medical Necessity Information: It is in your best interest to select a reason for this procedure from the list below. All of these items fulfill various CMS LCD requirements except the new and changing color options.
Medical Necessity Clause: This procedure was medically necessary because the lesions that were treated were:
Spray Paint Text: The liquid nitrogen was applied to the skin utilizing a spray paint frosting technique.

## 2025-05-07 NOTE — PROCEDURE: MOHS SURGERY
. Mart-1 - Negative Histology Text: MART-1 staining demonstrates a normal density and pattern of melanocytes along the dermal-epidermal junction. The surgical margins are negative for tumor cells.

## 2025-06-11 NOTE — PROGRESS NOTES
HMG CoA Reductase Inhibitors       rosuvastatin (CRESTOR) 40 MG tablet Take 1 tablet by mouth nightly       Angiotensin II Receptor Antagonists       losartan (COZAAR) 100 MG tablet Take 1 tablet by mouth nightly       Salicylates       aspirin 81 MG EC tablet Take 1 tablet by mouth daily          Diabetic Medications       Sulfonylureas       glipiZIDE (GLUCOTROL) 10 MG tablet Take 1 tablet by mouth 2 times daily (before meals)       Insulin       insulin glargine (LANTUS;BASAGLAR) 100 UNIT/ML injection pen Inject 20 Units into the skin nightly       Biguanides       metFORMIN (GLUCOPHAGE) 1000 MG tablet Take 1 tablet by mouth 2 times daily (with meals)       Dipeptidyl Peptidase-4 (DPP-4) Inhibitors       SITagliptin 25 MG TABS Take 25 mg by mouth     alogliptin (NESINA) 12.5 MG TABS tablet Take 1 tablet by mouth daily     Patient not taking: Reported on 12/6/2024                Past Medical History, Past Surgical History, Family history, Social History, and Medications were all reviewed with the patient today and updated as necessary.     Prior to Admission medications    Medication Sig Start Date End Date Taking? Authorizing Provider   tamsulosin (FLOMAX) 0.4 MG capsule  5/29/25  Yes Rachael Wyman MD   SITagliptin 25 MG TABS Take 25 mg by mouth   Yes Rachael Wyman MD   Coenzyme Q10-Vitamin E (QUNOL ULTRA COQ10) 100-150 MG-UNIT CAPS Take by mouth   Yes Rachael Wyman MD   losartan (COZAAR) 100 MG tablet Take 1 tablet by mouth nightly   Yes Rachael Wyman MD   magnesium oxide (MAG-OX) 400 (240 Mg) MG tablet Take 1 tablet by mouth 2 times daily   Yes Rachael Wyman MD   UNABLE TO FIND Take 1 tablet by mouth daily Blood Pressure Support Vitamin   Yes Rachael Wyman MD   metoprolol succinate (TOPROL XL) 50 MG extended release tablet Take 1 tablet by mouth daily   Yes Rachael Wyman MD   acetaminophen (TYLENOL) 325 MG tablet Take 2 tablets by mouth every 6 hours as

## 2025-06-12 ENCOUNTER — OFFICE VISIT (OUTPATIENT)
Age: 75
End: 2025-06-12
Payer: MEDICARE

## 2025-06-12 ENCOUNTER — FOLLOWUP TELEPHONE ENCOUNTER (OUTPATIENT)
Dept: DIABETES SERVICES | Age: 75
End: 2025-06-12

## 2025-06-12 VITALS
BODY MASS INDEX: 22.9 KG/M2 | HEIGHT: 70 IN | DIASTOLIC BLOOD PRESSURE: 80 MMHG | WEIGHT: 160 LBS | HEART RATE: 80 BPM | SYSTOLIC BLOOD PRESSURE: 134 MMHG

## 2025-06-12 DIAGNOSIS — I65.21 ASYMPTOMATIC STENOSIS OF RIGHT CAROTID ARTERY: ICD-10-CM

## 2025-06-12 DIAGNOSIS — E11.9 TYPE 2 DIABETES MELLITUS WITHOUT COMPLICATION, WITH LONG-TERM CURRENT USE OF INSULIN (HCC): Chronic | ICD-10-CM

## 2025-06-12 DIAGNOSIS — Z79.4 TYPE 2 DIABETES MELLITUS WITHOUT COMPLICATION, WITH LONG-TERM CURRENT USE OF INSULIN (HCC): Chronic | ICD-10-CM

## 2025-06-12 DIAGNOSIS — I25.118 CORONARY ARTERY DISEASE OF NATIVE ARTERY OF NATIVE HEART WITH STABLE ANGINA PECTORIS: Chronic | ICD-10-CM

## 2025-06-12 DIAGNOSIS — I10 ESSENTIAL HYPERTENSION: Primary | Chronic | ICD-10-CM

## 2025-06-12 PROCEDURE — 1036F TOBACCO NON-USER: CPT | Performed by: INTERNAL MEDICINE

## 2025-06-12 PROCEDURE — 1160F RVW MEDS BY RX/DR IN RCRD: CPT | Performed by: INTERNAL MEDICINE

## 2025-06-12 PROCEDURE — 1159F MED LIST DOCD IN RCRD: CPT | Performed by: INTERNAL MEDICINE

## 2025-06-12 PROCEDURE — 1123F ACP DISCUSS/DSCN MKR DOCD: CPT | Performed by: INTERNAL MEDICINE

## 2025-06-12 PROCEDURE — 99214 OFFICE O/P EST MOD 30 MIN: CPT | Performed by: INTERNAL MEDICINE

## 2025-06-12 PROCEDURE — 3075F SYST BP GE 130 - 139MM HG: CPT | Performed by: INTERNAL MEDICINE

## 2025-06-12 PROCEDURE — 1126F AMNT PAIN NOTED NONE PRSNT: CPT | Performed by: INTERNAL MEDICINE

## 2025-06-12 PROCEDURE — G8427 DOCREV CUR MEDS BY ELIG CLIN: HCPCS | Performed by: INTERNAL MEDICINE

## 2025-06-12 PROCEDURE — 3079F DIAST BP 80-89 MM HG: CPT | Performed by: INTERNAL MEDICINE

## 2025-06-12 PROCEDURE — G8420 CALC BMI NORM PARAMETERS: HCPCS | Performed by: INTERNAL MEDICINE

## 2025-06-12 PROCEDURE — 93000 ELECTROCARDIOGRAM COMPLETE: CPT | Performed by: INTERNAL MEDICINE

## 2025-06-12 RX ORDER — TAMSULOSIN HYDROCHLORIDE 0.4 MG/1
CAPSULE ORAL
COMMUNITY
Start: 2025-05-29

## 2025-06-12 ASSESSMENT — ENCOUNTER SYMPTOMS
SHORTNESS OF BREATH: 0
BACK PAIN: 1

## 2025-06-12 NOTE — PATIENT INSTRUCTIONS
[unfilled]       Learning About the Mediterranean Diet  What is the Mediterranean diet?     The Mediterranean diet is a style of eating rather than a diet plan. It features foods eaten in [place], [place], southern [place], and other countries along the [place] Sea. It emphasizes eating foods like fish, fruits, vegetables, beans, high-fiber breads and whole grains, nuts, and olive oil. This style of eating includes limited red meat, cheese, and sweets.  Why choose the Mediterranean diet?  A Mediterranean-style diet may improve heart health. It contains more fat than other heart-healthy diets. But the fats are mainly from nuts, unsaturated oils (such as fish oils and olive oil), and certain nut or seed oils (such as canola, soybean, or flaxseed oil). These fats may help protect the heart and blood vessels.  How can you get started on the Mediterranean diet?  Here are some things you can do to switch to a more Mediterranean way of eating.  What to eat  Eat a variety of fruits and vegetables each day, such as grapes, blueberries, tomatoes, broccoli, peppers, figs, olives, spinach, eggplant, beans, lentils, and chickpeas.  Eat a variety of whole-grain foods each day, such as oats, brown rice, and whole wheat bread, pasta, and couscous.  Eat fish at least 2 times a week. Try tuna, salmon, mackerel, lake trout, herring, or sardines.  Eat moderate amounts of low-fat dairy products, such as milk, cheese, or yogurt.  Eat moderate amounts of poultry and eggs.  Choose healthy (unsaturated) fats, such as nuts, olive oil, and certain nut or seed oils like canola, soybean, and flaxseed.  Limit unhealthy (saturated) fats, such as butter, palm oil, and coconut oil. And limit fats found in animal products, such as meat and dairy products made with whole milk. Try to eat red meat only a few times a month in very small amounts.  Limit sweets and desserts to only a few times a week. This includes sugar-sweetened drinks

## 2025-06-12 NOTE — TELEPHONE ENCOUNTER
Called patient to tell them about our free diabetes education program. Unable to speak to patient. Left message for patient to call back at [phone] or [phone]. DM2.

## 2025-06-13 ENCOUNTER — FOLLOWUP TELEPHONE ENCOUNTER (OUTPATIENT)
Dept: DIABETES SERVICES | Age: 75
End: 2025-06-13

## (undated) DEVICE — BLADE CLIPPER GEN PURP NS

## (undated) DEVICE — SOLUTION IRRIG 3000ML 0.9% SOD CHL USP UROMATIC PLAS CONT

## (undated) DEVICE — CATHETER DIAG 5FR L100CM LUMN ID0.047IN JR4 CRV 0 SIDE H

## (undated) DEVICE — GUIDEWIRE VASC L260CM DIA0.035IN RAD 3MM J TIP L7CM PTFE

## (undated) DEVICE — DRAPE IRRIG FLD WRM W44XL44IN W/ AORN STD PRTBL INTRATEMP

## (undated) DEVICE — GLIDESHEATH SLENDER STAINLESS STEEL KIT: Brand: GLIDESHEATH SLENDER

## (undated) DEVICE — CATHETER DIAG 5FR L100CM LUMN ID0.047IN JL4 CRV 0 SIDE H

## (undated) DEVICE — PINNACLE TIF INTRODUCER SHEATH: Brand: PINNACLE

## (undated) DEVICE — CATHETERIZATION KIT 9 FRX115 CM CV DL ARROWG+ARD +

## (undated) DEVICE — PERCUTANEOUS INSERTION KIT-ARTERIAL: Brand: PERCUTANEOUS INSERTION KIT-ARTERIAL

## (undated) DEVICE — DRAIN SURG SGL COLL PT TB FOR ATS BG OASIS

## (undated) DEVICE — SOLUTION IRRIG 1000ML 0.9% SOD CHL USP POUR PLAS BTL

## (undated) DEVICE — SHUNT CV L30CM DIA3X5MM SIL EXT LOOP FULL SPR REINF SUNDT

## (undated) DEVICE — FLOTRAC SENSOR WITH VAMP SYSTEM 60" / 152CM: Brand: FLOTRAC, VAMP

## (undated) DEVICE — CATHETER THOR 32FR L23IN PVC 5 EYELET STR ATRAUM

## (undated) DEVICE — SUTURE PERMAHAND SZ 0 L30IN NONABSORBABLE BLK L30MM PSL 3/8 590H

## (undated) DEVICE — PRESSURE MONITORING SET: Brand: TRUWAVE, VAMP PLUS

## (undated) DEVICE — SUTURE S STL SZ 5 L18IN NONABSORBABLE SIL CCS L48MM 1/2 CIR M653G

## (undated) DEVICE — SYRINGE MED 50ML LUERLOCK TIP

## (undated) DEVICE — PERFUSION PACK XCOATING 76320] TERUMO CARDIOVASCULAR]

## (undated) DEVICE — CANISTER, RIGID, 3000CC: Brand: MEDLINE INDUSTRIES, INC.

## (undated) DEVICE — SOLUTION IRRIG 1000ML LAC RINGER PLAS POUR BTL

## (undated) DEVICE — ANGIO-SEAL VIP VASCULAR CLOSURE DEVICE: Brand: ANGIO-SEAL

## (undated) DEVICE — SYSTEM ENDOSCP VES HARV W/ TOOL CANN SEAL SHT PRT BLNT TIP

## (undated) DEVICE — DRAPE, SLUSH XL, 44X66, STERILE: Brand: MEDLINE

## (undated) DEVICE — APPLIER CLP L9.375IN APER 2.1MM CLS L3.8MM 20 SM TI CLP

## (undated) DEVICE — CATHETER COR DIAG 4.0 5FR 110CM 2 SIDE H

## (undated) DEVICE — PVC URETHRAL CATHETER: Brand: DOVER

## (undated) DEVICE — LEAD PACE L475MM CHNL A OR V MYOCARDIAL STEROID ELUT SIL

## (undated) DEVICE — PLEDGET VASC W3/16XL3/8IN THK1/16IN PTFE SFT

## (undated) DEVICE — MPS® DELIVERY SET WITH 6 FT. DELIVERY TUBING: Brand: MPS

## (undated) DEVICE — CLIP LIG SM TI 20 BLU HNDL FOR OPN AND ENDOSCP SGL APPL

## (undated) DEVICE — CABG DR DENNIS: Brand: MEDLINE INDUSTRIES, INC.

## (undated) DEVICE — LOOP VES L406MM DIA1MM MAXI BLU SIL RADPQ DISP

## (undated) DEVICE — SCANLAN® SURG-I-LOOP® SILICONE LOOPS - BLUE SUPER MAXI, 5.0X1.27 MM, 16"/40 CM LONG (2/PKG): Brand: SCANLAN® SURG-I-LOOP® SILICONE LOOPS

## (undated) DEVICE — BAND COMPR L24CM REG CLR PLAS HEMSTAT EXT HK AND LOOP RETEN

## (undated) DEVICE — SUTURE PROL SZ 6-0 L24IN NONABSORBABLE BLU BV-1 L9.3MM 3/8 M8805

## (undated) DEVICE — TTL1LYR 16FR10ML 100%SIL TMPST TR: Brand: MEDLINE

## (undated) DEVICE — CAROTID DR WILLIAMS: Brand: MEDLINE INDUSTRIES, INC.

## (undated) DEVICE — CATHETER THOR 32FR L23IN PVC 6 EYELET STR ATRAUM

## (undated) DEVICE — BENTSON WIRE GUIDE 20CM DISTAL FLEXIBILITY WITH SOFTENED TIP: Brand: BENTSON

## (undated) DEVICE — CATHETER DIAG AD 5FR L110CM 145DEG COR GRY HYDRPHLC NYL

## (undated) DEVICE — INTENDED TO BE USED TO OCCLUDE, RETRACT AND IDENTIFY ARTERIES, VEINS, TENDONS AND NERVES IN SURGICAL PROCEDURES: Brand: STERION®  VESSEL LOOP

## (undated) DEVICE — CLAMP INSERT: Brand: STEALTH® CLAMP INSERT

## (undated) DEVICE — AUTOTRANSFUSION KIT 120 MH FAST STRT AT1 FOR CATS +